# Patient Record
Sex: FEMALE | Race: BLACK OR AFRICAN AMERICAN | NOT HISPANIC OR LATINO | Employment: FULL TIME | URBAN - METROPOLITAN AREA
[De-identification: names, ages, dates, MRNs, and addresses within clinical notes are randomized per-mention and may not be internally consistent; named-entity substitution may affect disease eponyms.]

---

## 2018-04-20 LAB
A1C: 5.5
CHOLEST SERPL-MSCNC: 197 MG/DL (ref 0–200)
HDLC SERPL-MCNC: 31 MG/DL
LDLC SERPL CALC-MCNC: 143 MG/DL
NON HDL CHOL (CALC): 166
TRIGL SERPL-MCNC: 114 MG/DL

## 2018-09-23 ENCOUNTER — NURSE TRIAGE (OUTPATIENT)
Dept: ADMINISTRATIVE | Facility: CLINIC | Age: 38
End: 2018-09-23

## 2018-09-23 NOTE — TELEPHONE ENCOUNTER
Patient called to report the following:      -she has issues with cold and sinus and took hydrocodone cough syrup   -no she can't stop itching since 5pm yesterday   -no hives, but itching is everywhere   -denies fever or trouble breathing     Education completed per [facility] Care Advice including home care and follow up with provider. Patient verbalized understating.     Reason for Disposition   [1] SEVERE widespread itching (i.e., interferes with sleep, normal activities or school) AND [2] not improved after 24 hours of itching Care Advice    Protocols used: ST ITCHING - WIDESPREAD AND CAUSE UNKNOWN-A-AH

## 2018-11-04 PROBLEM — F41.9 ANXIETY: Status: ACTIVE | Noted: 2018-01-11

## 2018-11-08 ENCOUNTER — OFFICE VISIT (OUTPATIENT)
Dept: INTERNAL MEDICINE | Facility: CLINIC | Age: 38
End: 2018-11-08
Payer: COMMERCIAL

## 2018-11-08 VITALS
BODY MASS INDEX: 39.68 KG/M2 | WEIGHT: 293 LBS | SYSTOLIC BLOOD PRESSURE: 120 MMHG | HEART RATE: 73 BPM | DIASTOLIC BLOOD PRESSURE: 77 MMHG | TEMPERATURE: 96 F | HEIGHT: 72 IN | OXYGEN SATURATION: 97 %

## 2018-11-08 DIAGNOSIS — M25.561 ACUTE PAIN OF RIGHT KNEE: ICD-10-CM

## 2018-11-08 DIAGNOSIS — Z00.00 ANNUAL PHYSICAL EXAM: Primary | ICD-10-CM

## 2018-11-08 DIAGNOSIS — E88.819 INSULIN RESISTANCE: ICD-10-CM

## 2018-11-08 DIAGNOSIS — Z23 NEED FOR INFLUENZA VACCINATION: ICD-10-CM

## 2018-11-08 PROBLEM — F41.9 ANXIETY: Status: RESOLVED | Noted: 2018-01-11 | Resolved: 2018-11-08

## 2018-11-08 PROCEDURE — 90686 IIV4 VACC NO PRSV 0.5 ML IM: CPT | Mod: S$GLB,,, | Performed by: FAMILY MEDICINE

## 2018-11-08 PROCEDURE — 99999 PR PBB SHADOW E&M-EST. PATIENT-LVL IV: CPT | Mod: PBBFAC,,, | Performed by: FAMILY MEDICINE

## 2018-11-08 PROCEDURE — 90471 IMMUNIZATION ADMIN: CPT | Mod: S$GLB,,, | Performed by: FAMILY MEDICINE

## 2018-11-08 PROCEDURE — 3008F BODY MASS INDEX DOCD: CPT | Mod: CPTII,S$GLB,, | Performed by: FAMILY MEDICINE

## 2018-11-08 PROCEDURE — 99204 OFFICE O/P NEW MOD 45 MIN: CPT | Mod: 25,S$GLB,, | Performed by: FAMILY MEDICINE

## 2018-11-08 RX ORDER — DICLOFENAC SODIUM 75 MG/1
TABLET, DELAYED RELEASE ORAL
Refills: 0 | COMMUNITY
Start: 2018-11-06 | End: 2019-01-21

## 2018-11-08 RX ORDER — METFORMIN HYDROCHLORIDE 500 MG/1
500 TABLET ORAL 2 TIMES DAILY WITH MEALS
Qty: 180 TABLET | Refills: 3 | Status: SHIPPED | OUTPATIENT
Start: 2018-11-08 | End: 2019-02-19

## 2018-11-08 RX ORDER — TRAMADOL HYDROCHLORIDE 50 MG/1
TABLET ORAL
Refills: 0 | COMMUNITY
Start: 2018-11-06 | End: 2019-01-21

## 2018-11-08 RX ORDER — PANTOPRAZOLE SODIUM 40 MG/1
40 TABLET, DELAYED RELEASE ORAL DAILY
Qty: 90 TABLET | Refills: 0 | Status: SHIPPED | OUTPATIENT
Start: 2018-11-08 | End: 2019-10-10

## 2018-11-08 NOTE — PATIENT INSTRUCTIONS
Arthralgia    Arthralgia is the term for pain in or around the joint. It is a symptom, not a disease. This pain may involve one or more joints. In some cases, the pain moves from joint to joint.  There are many causes for joint pain. These include:  · Injury  · Osteoarthritis (wearing out of the joint surface)  · Gout (inflammation of the joint due to crystals in the joint fluid)  · Infection inside the joint    · Bursitis (inflammation of the fluid-filled sacs around the joint)  · Autoimmune disorders such as rheumatoid arthritis or lupus  · Tendonitis (inflammation of chords that attach muscle to bone)  Home care  · Rest the involved joint(s) until your symptoms improve.   · You may be prescribed pain medicine. If none is prescribed, you may use acetaminophen or ibuprofen to control pain and inflammation.  Follow-up care  Follow up with your healthcare provider or as advised.  When to seek medical advice  Contact your healthcare provider right away if any of the following occurs:  · Pain, swelling, or redness of joint increases  · Pain worsens or recurs after a period of improvement  · Pain moves to other joints  · You cannot bear weight on the affected joint   · You cannot move the affected joint  · Joint appears deformed  · New rash appears  · Fever of 100.4ºF (38ºC) or higher, or as directed by your healthcare provider  Date Last Reviewed: 3/1/2017  © 5537-3204 [employer]. [address] [place] [address], [place] [address]. All rights reserved. This information is not intended as a substitute for professional medical care. Always follow your healthcare professional's instructions.        Arthralgia    Arthralgia is the term for pain in or around the joint. It is a symptom, not a disease. This pain may involve one or more joints. In some cases, the pain moves from joint to joint.  There are many causes for joint pain. These include:  · Injury  · Osteoarthritis (wearing out of the joint surface)  · Gout  (inflammation of the joint due to crystals in the joint fluid)  · Infection inside the joint    · Bursitis (inflammation of the fluid-filled sacs around the joint)  · Autoimmune disorders such as rheumatoid arthritis or lupus  · Tendonitis (inflammation of chords that attach muscle to bone)  Home care  · Rest the involved joint(s) until your symptoms improve.   · You may be prescribed pain medicine. If none is prescribed, you may use acetaminophen or ibuprofen to control pain and inflammation.  Follow-up care  Follow up with your healthcare provider or as advised.  When to seek medical advice  Contact your healthcare provider right away if any of the following occurs:  · Pain, swelling, or redness of joint increases  · Pain worsens or recurs after a period of improvement  · Pain moves to other joints  · You cannot bear weight on the affected joint   · You cannot move the affected joint  · Joint appears deformed  · New rash appears  · Fever of 100.4ºF (38ºC) or higher, or as directed by your healthcare provider  Date Last Reviewed: 3/1/2017  © 0531-6899 [employer]. [address] [place] [address], [place] [address]. All rights reserved. This information is not intended as a substitute for professional medical care. Always follow your healthcare professional's instructions.

## 2018-11-08 NOTE — PROGRESS NOTES
Brook Burdick  11/08/2018  [id number]    Adamaris Arguello MD  Patient Care Team:  Adamaris Arguello MD as PCP - General (Family Medicine)  Has the patient seen any provider outside of the [facility] network since the last visit? (no). If yes, HIPPA forms completed and records requested.        Visit Type:a scheduled routine follow-up visit    Chief Complaint:  Chief Complaint   Patient presents with    Establish Care    Knee Pain     right knee pain and it has been going on for about a week. she went to urgent care tuesday they gave her tramadol and diclofenac sodium 75mg.       History of Present Illness:  Patient is here for check up.    She reports history of knee pain. She went to Urgent care. They gave her Ultram and NSAID.  She went about a week.  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 She reports that she landed on it wrong. She reports that she does get swelling.  She reports that NSAID has helped with the swelling. Most pain is on both sides of the knee, and if she sits with it bent she feels her knee cap shift.  She reports feeling of heavy in the knee.       She does have GERD, used to be on Protonix, she changed insurance and had to stop. She would like to start.  She also has IBS sx.    She reports last pap was in 2016. She reports done at [facility]. Dr. Espinoza    Labs reviewed from [facility]  GLucose 107.   hgA1c 5.5  Insulin level  Has been elevated in the past.      History:  Past Medical History:   Diagnosis Date    IBS (irritable bowel syndrome)     Pancreatitis      Past Surgical History:   Procedure Laterality Date    APPENDECTOMY      BILE DUCT EXPLORATION      CHOLECYSTECTOMY       Family History   Problem Relation Age of Onset    Atrial fibrillation Mother     Diabetes Father     Hypertension Father     Cancer Maternal Grandfather      Social History     Socioeconomic History    Marital status: Single     Spouse name: Not on file    Number of children: Not on  file    Years of education: Not on file    Highest education level: Not on file   Social Needs    Financial resource strain: Not on file    Food insecurity - worry: Not on file    Food insecurity - inability: Not on file    Transportation needs - medical: Not on file    Transportation needs - non-medical: Not on file   Occupational History    Not on file   Tobacco Use    Smoking status: Never Smoker    Smokeless tobacco: Never Used   Substance and Sexual Activity    Alcohol use: No     Frequency: Never    Drug use: No    Sexual activity: No   Other Topics Concern    Not on file   Social History Narrative    Not on file     Patient Active Problem List   Diagnosis    Anxiety    Chronic constipation    Family history of colonic polyps    Insulin resistance     Review of patient's allergies indicates:   Allergen Reactions    Penicillins Hives       The following were reviewed at this visit: active problem list, medication list, allergies, family history, social history, and health maintenance.    Medications:  Current Outpatient Medications on File Prior to Visit   Medication Sig Dispense Refill    diclofenac (VOLTAREN) 75 MG EC tablet TK 1 T PO BID FOR 10 DAYS PRF PAIN  0    traMADol (ULTRAM) 50 mg tablet TK 1 TO 2 TS PO Q 6 H PRN P  0    [DISCONTINUED] pantoprazole (PROTONIX) 40 MG tablet TK 1 T PO QD      [DISCONTINUED] sertraline (ZOLOFT) 50 MG tablet Take 1 tablet by mouth daily.       No current facility-administered medications on file prior to visit.        Medications have been reviewed and reconciled with patient at this visit.  Barriers to medications present (no)    Adverse reactions to current medications (no)    Over the counter medications reviewed (Yes ), and if needed added to active Medication list at this visit.     Exam:  Wt Readings from Last 3 Encounters:   11/08/18 (!) 153.4 kg (338 lb 3 oz)     Temp Readings from Last 3 Encounters:   11/08/18 96.4 °F (35.8 °C) (Tympanic)      BP Readings from Last 3 Encounters:   No data found for BP     Pulse Readings from Last 3 Encounters:   11/08/18 73     Body mass index is 45.87 kg/m².      Review of Systems   Constitutional: Negative.  Negative for chills and fever.   HENT: Negative.  Negative for congestion, sinus pain and sore throat.    Eyes: Negative for blurred vision and double vision.   Respiratory: Negative for cough, sputum production, shortness of breath and wheezing.    Cardiovascular: Negative for chest pain, palpitations and leg swelling.   Gastrointestinal: Negative for abdominal pain, constipation, diarrhea, heartburn, nausea and vomiting.   Genitourinary: Negative.    Musculoskeletal: Positive for joint pain.   Skin: Negative.  Negative for rash.   Neurological: Negative.    Endo/Heme/Allergies: Negative.  Negative for polydipsia. Does not bruise/bleed easily.   Psychiatric/Behavioral: Negative for depression and substance abuse.     Physical Exam   Constitutional: She is oriented to person, place, and time. She appears well-developed and well-nourished. No distress.   HENT:   Head: Normocephalic and atraumatic.   Right Ear: External ear normal.   Left Ear: External ear normal.   Nose: Nose normal.   Mouth/Throat: Oropharynx is clear and moist. No oropharyngeal exudate.   Eyes: Conjunctivae and EOM are normal. Pupils are equal, round, and reactive to light. Right eye exhibits no discharge. Left eye exhibits no discharge.   Neck: Normal range of motion. Neck supple. No thyromegaly present.   Cardiovascular: Normal rate, regular rhythm, normal heart sounds and intact distal pulses.   No murmur heard.  Pulmonary/Chest: Effort normal and breath sounds normal. No respiratory distress. She has no wheezes.   Abdominal: Soft. Bowel sounds are normal. She exhibits no distension and no mass. There is no tenderness.   Musculoskeletal: Normal range of motion. She exhibits tenderness. She exhibits no edema.        Right knee: She exhibits  swelling. She exhibits no bony tenderness. Tenderness found. Patellar tendon tenderness noted.        Legs:  Lymphadenopathy:     She has no cervical adenopathy.   Neurological: She is alert and oriented to person, place, and time. No cranial nerve deficit.   Skin: Capillary refill takes less than 2 seconds. She is not diaphoretic.   Psychiatric: She has a normal mood and affect. Her behavior is normal. Judgment and thought content normal.   Nursing note and vitals reviewed.      Laboratory Reviewed ({Yes)  Lab Results   Component Value Date    WBC 6.14 08/28/2007    HGB 13.1 08/28/2007    HCT 39.3 08/28/2007     08/28/2007    CHOL 211 (H) 08/28/2007    TRIG 92 08/28/2007    HDL 36 (L) 08/28/2007    ALT 33 (H) 08/28/2007    AST 22 08/28/2007     08/28/2007    K 4.1 08/28/2007     08/28/2007    CREATININE 0.7 08/28/2007    BUN 14 08/28/2007    CO2 20 (L) 08/28/2007    TSH 1.8 08/28/2007       Annual physical exam    Acute pain of right knee  -     Ambulatory consult to Physical Therapy    Insulin resistance  -     metFORMIN (GLUCOPHAGE) 500 MG tablet; Take 1 tablet (500 mg total) by mouth 2 (two) times daily with meals.  Dispense: 180 tablet; Refill: 3    Need for influenza vaccination    Other orders  -     pantoprazole (PROTONIX) 40 MG tablet; Take 1 tablet (40 mg total) by mouth once daily.  Dispense: 90 tablet; Refill: 0    Records from [facility]  Diet and exercise weight loss  Trial of Metformin, discuss side effects  Follow up 6 months    The patient's BMI has been recorded in the chart. The patient has been provided educational materials regarding the benefits of attaining and maintaining a normal weight. We will continue to address and follow this issue during follow up visits.      Care Plan/Goals: Reviewed  (N/A)  Goals     None          Follow up: No Follow-up on file.    After visit summary was printed and given to patient upon discharge today.  Patient goals and care plan are included in  After Visit Summary.

## 2018-12-02 PROBLEM — K21.9 GASTROESOPHAGEAL REFLUX DISEASE WITHOUT ESOPHAGITIS: Status: ACTIVE | Noted: 2018-12-02

## 2018-12-02 NOTE — PROGRESS NOTES
Brook Burdick  12/03/2018  [id number]    Adamaris Arguello MD  Patient Care Team:  Adamaris Arguello MD as PCP - General (Family Medicine)  Alice Walker MD as Obstetrician (Obstetrics)  Has the patient seen any provider outside of the [facility] network since the last visit? (no). If yes, HIPPA forms completed and records requested.        Visit Type:an urgent visit for an existing problem     Chief Complaint:  No chief complaint on file.      History of Present Illness:  Patient here to discuss GI issues.    She has family history of colon polyps. She has seen colon rectal at [facility], Dr. Lin. She had colon done with 2 polyps.  She also consulted for chronic constipation. This was in 2016 she had colon done. Reported to repeat colon in 5 years.    She also saw Dr. Lott at [facility]. She saw him for reflux. She had EGD done in 9/2017 and had gastric erythema and was given PPI.  She reports intermittent IBS symptoms with bloating and constipation.    She has stomach pain, burning pain.  She reports [employer] works, but then she has to miss work because of the diarrhea, and feels that it makes her rectum raw.    She does take PPI. She still has GERD with the Protonix.     She is looking to loose weight.  She was last on Apidex in 2011.  She would like to try again  She wants to loose weight.       History:  Past Medical History:   Diagnosis Date    IBS (irritable bowel syndrome)     Pancreatitis      Past Surgical History:   Procedure Laterality Date    APPENDECTOMY      BILE DUCT EXPLORATION      CHOLECYSTECTOMY       Family History   Problem Relation Age of Onset    Atrial fibrillation Mother     Diabetes Father     Hypertension Father     Cancer Maternal Grandfather      Social History     Socioeconomic History    Marital status: Single     Spouse name: Not on file    Number of children: Not on file    Years of education: Not on file    Highest education level: Not on file   Social  Needs    Financial resource strain: Not on file    Food insecurity - worry: Not on file    Food insecurity - inability: Not on file    Transportation needs - medical: Not on file    Transportation needs - non-medical: Not on file   Occupational History    Not on file   Tobacco Use    Smoking status: Never Smoker    Smokeless tobacco: Never Used   Substance and Sexual Activity    Alcohol use: No     Frequency: Never    Drug use: No    Sexual activity: No   Other Topics Concern    Not on file   Social History Narrative    Not on file     Patient Active Problem List   Diagnosis    Chronic constipation    Family history of colonic polyps    Insulin resistance    Gastroesophageal reflux disease without esophagitis     Review of patient's allergies indicates:   Allergen Reactions    Penicillins Hives    Tramadol Itching       The following were reviewed at this visit: active problem list, medication list, allergies, family history, social history, and health maintenance.    Medications:  Current Outpatient Medications on File Prior to Visit   Medication Sig Dispense Refill    diclofenac (VOLTAREN) 75 MG EC tablet TK 1 T PO BID FOR 10 DAYS PRF PAIN  0    metFORMIN (GLUCOPHAGE) 500 MG tablet Take 1 tablet (500 mg total) by mouth 2 (two) times daily with meals. 180 tablet 3    pantoprazole (PROTONIX) 40 MG tablet Take 1 tablet (40 mg total) by mouth once daily. 90 tablet 0    traMADol (ULTRAM) 50 mg tablet TK 1 TO 2 TS PO Q 6 H PRN P  0     No current facility-administered medications on file prior to visit.        Medications have been reviewed and reconciled with patient at this visit.  Barriers to medications present (no)    Adverse reactions to current medications (no)    Over the counter medications reviewed (Yes ), and if needed added to active Medication list at this visit.     Exam:  Wt Readings from Last 3 Encounters:   11/08/18 (!) 153.4 kg (338 lb 3 oz)     Temp Readings from Last 3  Encounters:   11/08/18 96.4 °F (35.8 °C) (Tympanic)     BP Readings from Last 3 Encounters:   11/08/18 120/77     Pulse Readings from Last 3 Encounters:   11/08/18 73     There is no height or weight on file to calculate BMI.      Review of Systems   Constitutional: Negative.  Negative for chills and fever.   HENT: Negative.  Negative for congestion, sinus pain and sore throat.    Eyes: Negative for blurred vision and double vision.   Respiratory: Negative for cough, sputum production, shortness of breath and wheezing.    Cardiovascular: Negative for chest pain, palpitations and leg swelling.   Gastrointestinal: Positive for abdominal pain and constipation. Negative for diarrhea, heartburn, nausea and vomiting.   Genitourinary: Negative.    Musculoskeletal: Negative.    Skin: Negative.  Negative for rash.   Neurological: Negative.    Endo/Heme/Allergies: Negative.  Negative for polydipsia. Does not bruise/bleed easily.   Psychiatric/Behavioral: Negative for depression and substance abuse.     Physical Exam   Constitutional: She is oriented to person, place, and time. She appears well-developed and well-nourished. No distress.   HENT:   Head: Normocephalic and atraumatic.   Right Ear: External ear normal.   Left Ear: External ear normal.   Nose: Nose normal.   Mouth/Throat: Oropharynx is clear and moist. No oropharyngeal exudate.   Eyes: Conjunctivae and EOM are normal. Pupils are equal, round, and reactive to light. Right eye exhibits no discharge. Left eye exhibits no discharge.   Neck: Normal range of motion. Neck supple. No thyromegaly present.   Cardiovascular: Normal rate, regular rhythm, normal heart sounds and intact distal pulses.   No murmur heard.  Pulmonary/Chest: Effort normal and breath sounds normal. No respiratory distress. She has no wheezes.   Abdominal: Soft. Bowel sounds are normal. She exhibits no distension and no mass. There is no tenderness.   Musculoskeletal: Normal range of motion. She  exhibits no edema.   Lymphadenopathy:     She has no cervical adenopathy.   Neurological: She is alert and oriented to person, place, and time. No cranial nerve deficit.   Skin: Capillary refill takes less than 2 seconds. She is not diaphoretic.   Psychiatric: She has a normal mood and affect. Her behavior is normal. Judgment and thought content normal.   Nursing note and vitals reviewed.      Laboratory Reviewed ({Yes)  Lab Results   Component Value Date    WBC 6.14 08/28/2007    HGB 13.1 08/28/2007    HCT 39.3 08/28/2007     08/28/2007    CHOL 211 (H) 08/28/2007    TRIG 92 08/28/2007    HDL 36 (L) 08/28/2007    ALT 33 (H) 08/28/2007    AST 22 08/28/2007     08/28/2007    K 4.1 08/28/2007     08/28/2007    CREATININE 0.7 08/28/2007    BUN 14 08/28/2007    CO2 20 (L) 08/28/2007    TSH 1.8 08/28/2007       Diagnoses and all orders for this visit:    Irritable bowel syndrome with constipation    Gastroesophageal reflux disease without esophagitis      Switch to Amitiza  Elimination diet  Continue PPI.    Follow up with GI for additional recommendations.    Apidex weight loss  Increase exercise  Increase water intake  Only will be on for 90 days          Care Plan/Goals: Reviewed  (N/A)  Goals     None          Follow up: No Follow-up on file.    After visit summary was printed and given to patient upon discharge today.  Patient goals and care plan are included in After Visit Summary.

## 2018-12-03 ENCOUNTER — OFFICE VISIT (OUTPATIENT)
Dept: INTERNAL MEDICINE | Facility: CLINIC | Age: 38
End: 2018-12-03
Payer: COMMERCIAL

## 2018-12-03 VITALS
RESPIRATION RATE: 16 BRPM | BODY MASS INDEX: 39.68 KG/M2 | SYSTOLIC BLOOD PRESSURE: 122 MMHG | HEART RATE: 85 BPM | HEIGHT: 72 IN | TEMPERATURE: 97 F | DIASTOLIC BLOOD PRESSURE: 78 MMHG | OXYGEN SATURATION: 96 % | WEIGHT: 293 LBS

## 2018-12-03 DIAGNOSIS — K58.1 IRRITABLE BOWEL SYNDROME WITH CONSTIPATION: Primary | ICD-10-CM

## 2018-12-03 DIAGNOSIS — K21.9 GASTROESOPHAGEAL REFLUX DISEASE WITHOUT ESOPHAGITIS: ICD-10-CM

## 2018-12-03 DIAGNOSIS — E66.01 MORBID OBESITY: ICD-10-CM

## 2018-12-03 PROCEDURE — 3008F BODY MASS INDEX DOCD: CPT | Mod: CPTII,S$GLB,, | Performed by: FAMILY MEDICINE

## 2018-12-03 PROCEDURE — 99999 PR PBB SHADOW E&M-EST. PATIENT-LVL III: CPT | Mod: PBBFAC,,, | Performed by: FAMILY MEDICINE

## 2018-12-03 PROCEDURE — 99214 OFFICE O/P EST MOD 30 MIN: CPT | Mod: S$GLB,,, | Performed by: FAMILY MEDICINE

## 2018-12-03 RX ORDER — PHENTERMINE HYDROCHLORIDE 37.5 MG/1
37.5 TABLET ORAL
Qty: 30 TABLET | Refills: 0 | Status: SHIPPED | OUTPATIENT
Start: 2018-12-03 | End: 2019-01-09 | Stop reason: SDUPTHER

## 2018-12-03 RX ORDER — LUBIPROSTONE 8 UG/1
8 CAPSULE ORAL 2 TIMES DAILY
Qty: 60 CAPSULE | Refills: 3 | Status: SHIPPED | OUTPATIENT
Start: 2018-12-03 | End: 2019-05-13

## 2019-01-08 ENCOUNTER — TELEPHONE (OUTPATIENT)
Dept: INTERNAL MEDICINE | Facility: CLINIC | Age: 39
End: 2019-01-08

## 2019-01-08 NOTE — TELEPHONE ENCOUNTER
----- Message from Mecca Sierra sent at 1/8/2019  4:39 PM CST -----  Contact: pt  She is calling to get a refill...    1. What is the name of the medication you are requesting? Adipex  2. What is the dose? unknown  3. How do you take the medication? Orally, topically, etc? orally  4. How often do you take this medication? Once daily  5. Do you need a 30 day or 90 day supply? 30  6. How many refills are you requesting? 1  7. What is your preferred pharmacy and location of the pharmacy? [facility] on [address] [facility]  8. Who can we contact with further questions? [phone] (pgvp)

## 2019-01-09 RX ORDER — PHENTERMINE HYDROCHLORIDE 37.5 MG/1
37.5 TABLET ORAL
Qty: 30 TABLET | Refills: 0 | Status: SHIPPED | OUTPATIENT
Start: 2019-01-09 | End: 2019-02-08

## 2019-01-16 ENCOUNTER — TELEPHONE (OUTPATIENT)
Dept: INTERNAL MEDICINE | Facility: CLINIC | Age: 39
End: 2019-01-16

## 2019-01-16 NOTE — TELEPHONE ENCOUNTER
----- Message from Venita Stafford sent at 1/16/2019  4:06 PM CST -----  Contact: Pt   Pt called and stated she needs a refill:    .1. What is the name of the medication you are requesting? Tramadol   2. What is the dose? Pt called and stated she does not know   3. How do you take the medication? Orally, topically, etc? Orally   4. How often do you take this medication?As needed    5. Do you need a 30 day or 90 day supply? 30 days   6. How many refills are you requesting?   7. What is your preferred pharmacy and location of the pharmacy?   [facility] [address] [place] [address] [address] [place] [address] AT [facility]  [address] [facility] [address]  Phone: [phone] Fax: [phone]      8. Who can we contact with further questions? Pt can be reached at [phone] (home)    Thanks,  TF

## 2019-01-17 NOTE — TELEPHONE ENCOUNTER
Patient asked would that mess with the issues she already has with her stomach? She said but whatever you think is best.

## 2019-01-18 NOTE — TELEPHONE ENCOUNTER
She can stop the Naprosyn. If she is worried more about GERD or stomach, Mobic would be the choice, and to skin with taking Protonix with it.  I will send if she agrees. I don't want her on chronic pain meds.

## 2019-01-21 RX ORDER — MELOXICAM 7.5 MG/1
7.5 TABLET ORAL DAILY
Qty: 30 TABLET | Refills: 0 | Status: SHIPPED | OUTPATIENT
Start: 2019-01-21 | End: 2019-10-10

## 2019-02-19 ENCOUNTER — TELEPHONE (OUTPATIENT)
Dept: PEDIATRICS | Facility: CLINIC | Age: 39
End: 2019-02-19

## 2019-02-19 NOTE — TELEPHONE ENCOUNTER
----- Message from Starrmoisedeisi Kurtis sent at 2/19/2019  2:09 PM CST -----  Contact: Pt  ..1. What is the name of the medication you are requesting? ADIPEX  2. What is the dose? N/A  3. How do you take the medication? Orally, topically, etc? Orally  4. How often do you take this medication? Daily  5. Do you need a 30 day or 90 day supply? 30  6. How many refills are you requesting? 1  7. What is your preferred pharmacy and location of the pharmacy? ..  [facility] [id number] [place] [address] [address] [facility]  [address]  [facility] [address]  Phone: [phone] Fax: [phone]      8. Who can we contact with further questions? ..[phone] ([place])

## 2019-02-20 ENCOUNTER — OFFICE VISIT (OUTPATIENT)
Dept: INTERNAL MEDICINE | Facility: CLINIC | Age: 39
End: 2019-02-20
Payer: COMMERCIAL

## 2019-02-20 ENCOUNTER — CLINICAL SUPPORT (OUTPATIENT)
Dept: CARDIOLOGY | Facility: CLINIC | Age: 39
End: 2019-02-20
Payer: COMMERCIAL

## 2019-02-20 VITALS
HEART RATE: 76 BPM | DIASTOLIC BLOOD PRESSURE: 70 MMHG | WEIGHT: 293 LBS | TEMPERATURE: 97 F | BODY MASS INDEX: 39.68 KG/M2 | SYSTOLIC BLOOD PRESSURE: 110 MMHG | OXYGEN SATURATION: 97 % | HEIGHT: 72 IN

## 2019-02-20 DIAGNOSIS — M25.561 CHRONIC PAIN OF BOTH KNEES: ICD-10-CM

## 2019-02-20 DIAGNOSIS — M54.5 CHRONIC MIDLINE LOW BACK PAIN, WITH SCIATICA PRESENCE UNSPECIFIED: ICD-10-CM

## 2019-02-20 DIAGNOSIS — G89.29 CHRONIC MIDLINE LOW BACK PAIN, WITH SCIATICA PRESENCE UNSPECIFIED: ICD-10-CM

## 2019-02-20 DIAGNOSIS — R00.0 RAPID HEART RATE: Primary | ICD-10-CM

## 2019-02-20 DIAGNOSIS — Z71.3 WEIGHT LOSS COUNSELING, ENCOUNTER FOR: ICD-10-CM

## 2019-02-20 DIAGNOSIS — R00.0 RAPID HEART RATE: ICD-10-CM

## 2019-02-20 DIAGNOSIS — G89.29 CHRONIC PAIN OF BOTH KNEES: ICD-10-CM

## 2019-02-20 DIAGNOSIS — E66.01 SEVERE OBESITY (BMI >= 40): ICD-10-CM

## 2019-02-20 DIAGNOSIS — M25.562 CHRONIC PAIN OF BOTH KNEES: ICD-10-CM

## 2019-02-20 PROCEDURE — 99213 OFFICE O/P EST LOW 20 MIN: CPT | Mod: S$GLB,,, | Performed by: FAMILY MEDICINE

## 2019-02-20 PROCEDURE — 93005 ELECTROCARDIOGRAM TRACING: CPT | Mod: S$GLB,,, | Performed by: FAMILY MEDICINE

## 2019-02-20 PROCEDURE — 99213 PR OFFICE/OUTPT VISIT, EST, LEVL III, 20-29 MIN: ICD-10-PCS | Mod: S$GLB,,, | Performed by: FAMILY MEDICINE

## 2019-02-20 PROCEDURE — 93010 EKG 12-LEAD: ICD-10-PCS | Mod: S$GLB,,, | Performed by: INTERNAL MEDICINE

## 2019-02-20 PROCEDURE — 3008F BODY MASS INDEX DOCD: CPT | Mod: CPTII,S$GLB,, | Performed by: FAMILY MEDICINE

## 2019-02-20 PROCEDURE — 3008F PR BODY MASS INDEX (BMI) DOCUMENTED: ICD-10-PCS | Mod: CPTII,S$GLB,, | Performed by: FAMILY MEDICINE

## 2019-02-20 PROCEDURE — 99999 PR PBB SHADOW E&M-EST. PATIENT-LVL IV: CPT | Mod: PBBFAC,,, | Performed by: FAMILY MEDICINE

## 2019-02-20 PROCEDURE — 93005 EKG 12-LEAD: ICD-10-PCS | Mod: S$GLB,,, | Performed by: FAMILY MEDICINE

## 2019-02-20 PROCEDURE — 99999 PR PBB SHADOW E&M-EST. PATIENT-LVL IV: ICD-10-PCS | Mod: PBBFAC,,, | Performed by: FAMILY MEDICINE

## 2019-02-20 PROCEDURE — 93010 ELECTROCARDIOGRAM REPORT: CPT | Mod: S$GLB,,, | Performed by: INTERNAL MEDICINE

## 2019-02-20 RX ORDER — PHENTERMINE HYDROCHLORIDE 37.5 MG/1
37.5 CAPSULE ORAL EVERY MORNING
Qty: 30 CAPSULE | Refills: 0 | Status: SHIPPED | OUTPATIENT
Start: 2019-02-20 | End: 2019-02-20 | Stop reason: SDUPTHER

## 2019-02-20 RX ORDER — PHENTERMINE HYDROCHLORIDE 37.5 MG/1
37.5 CAPSULE ORAL EVERY MORNING
Qty: 30 CAPSULE | Refills: 0 | Status: SHIPPED | OUTPATIENT
Start: 2019-02-20 | End: 2019-03-22

## 2019-02-20 NOTE — PROGRESS NOTES
Brook Burdick  02/22/2019  [id number]    Adamaris Arguello MD  Patient Care Team:  Adamaris Arguello MD as PCP - General (Family Medicine)  Alice Walker MD as Obstetrician (Obstetrics)  Yue Chan III, MD (Obstetrics and Gynecology)  Has the patient seen any provider outside of the [facility] network since the last visit? (no). If yes, HIPPA forms completed and records requested.        Visit Type:a scheduled routine follow-up visit    Chief Complaint:  Chief Complaint   Patient presents with    Follow-up       History of Present Illness:  38 year old here for follow up.    She was on Adipex for work on weight loss.    Some palpitations, bounding heart rate. She repots that this was doing it before Adipex.   She did loose weight with Adipex.   She does admit to more Anxiety. She was on Zoloft before that did help with her anxiety, but stopped taking it last year as she felt a little better.    No metformin for IR due to her IBS.    She has chronic knee pain. Reports barrier to exercise.  She reports also having lower back pain. WOrse with prolonged sitting and standing.  No pain down legs.  Takes OTC tylenol/motrin, but still hurts.      History:  Past Medical History:   Diagnosis Date    IBS (irritable bowel syndrome)     Pancreatitis      Past Surgical History:   Procedure Laterality Date    APPENDECTOMY      BILE DUCT EXPLORATION      CHOLECYSTECTOMY       Family History   Problem Relation Age of Onset    Atrial fibrillation Mother     Diabetes Father     Hypertension Father     Cancer Maternal Grandfather      Social History     Socioeconomic History    Marital status: Single     Spouse name: Not on file    Number of children: Not on file    Years of education: Not on file    Highest education level: Not on file   Social Needs    Financial resource strain: Not on file    Food insecurity - worry: Not on file    Food insecurity - inability: Not on file    Transportation needs  - medical: Not on file    Transportation needs - non-medical: Not on file   Occupational History    Not on file   Tobacco Use    Smoking status: Never Smoker    Smokeless tobacco: Never Used   Substance and Sexual Activity    Alcohol use: No     Frequency: Never    Drug use: No    Sexual activity: No   Other Topics Concern    Not on file   Social History Narrative    Not on file     Patient Active Problem List   Diagnosis    Chronic constipation    Family history of colonic polyps    Insulin resistance    Gastroesophageal reflux disease without esophagitis     Review of patient's allergies indicates:   Allergen Reactions    Penicillins Hives    Tramadol Itching       The following were reviewed at this visit: active problem list, medication list, allergies, family history, social history, and health maintenance.    Medications:  Current Outpatient Medications on File Prior to Visit   Medication Sig Dispense Refill    meloxicam (MOBIC) 7.5 MG tablet Take 1 tablet (7.5 mg total) by mouth once daily. 30 tablet 0    lubiprostone (AMITIZA) 8 MCG Cap Take 1 capsule (8 mcg total) by mouth 2 (two) times daily. 60 capsule 3    pantoprazole (PROTONIX) 40 MG tablet Take 1 tablet (40 mg total) by mouth once daily. 90 tablet 0     No current facility-administered medications on file prior to visit.        Medications have been reviewed and reconciled with patient at this visit.  Barriers to medications present (no)    Adverse reactions to current medications (no)    Over the counter medications reviewed (No ), and if needed added to active Medication list at this visit.     Exam:  Wt Readings from Last 3 Encounters:   02/20/19 (!) 146.6 kg (323 lb 3.1 oz)   12/03/18 (!) 154.7 kg (341 lb 0.8 oz)   11/08/18 (!) 153.4 kg (338 lb 3 oz)     Temp Readings from Last 3 Encounters:   02/20/19 97.2 °F (36.2 °C) (Tympanic)   12/03/18 97.3 °F (36.3 °C)   11/08/18 96.4 °F (35.8 °C) (Tympanic)     BP Readings from Last 3  Encounters:   02/20/19 110/70   12/03/18 122/78   11/08/18 120/77     Pulse Readings from Last 3 Encounters:   02/20/19 76   12/03/18 85   11/08/18 73     Body mass index is 43.83 kg/m².      Review of Systems   Constitutional: Negative.  Negative for chills and fever.   HENT: Negative.  Negative for congestion, hearing loss, sinus pain and sore throat.    Eyes: Negative for blurred vision, double vision and discharge.   Respiratory: Negative for cough, sputum production, shortness of breath and wheezing.    Cardiovascular: Positive for chest pain and palpitations. Negative for leg swelling.   Gastrointestinal: Negative for abdominal pain, blood in stool, constipation, diarrhea, heartburn, nausea and vomiting.   Genitourinary: Negative.  Negative for dysuria and hematuria.   Musculoskeletal: Positive for back pain and joint pain. Negative for neck pain.   Skin: Negative.  Negative for rash.   Neurological: Positive for headaches. Negative for weakness.   Endo/Heme/Allergies: Negative.  Negative for polydipsia. Does not bruise/bleed easily.   Psychiatric/Behavioral: Negative for depression and substance abuse.     Physical Exam   Constitutional: She is oriented to person, place, and time. She appears well-developed and well-nourished. No distress.   HENT:   Head: Normocephalic and atraumatic.   Right Ear: External ear normal.   Left Ear: External ear normal.   Nose: Nose normal.   Mouth/Throat: Oropharynx is clear and moist. No oropharyngeal exudate.   Eyes: Conjunctivae and EOM are normal. Pupils are equal, round, and reactive to light. Right eye exhibits no discharge. Left eye exhibits no discharge.   Neck: Normal range of motion. Neck supple. No thyromegaly present.   Cardiovascular: Normal rate, regular rhythm, normal heart sounds and intact distal pulses.   No murmur heard.  Pulmonary/Chest: Effort normal and breath sounds normal. No respiratory distress. She has no wheezes.   Abdominal: Soft. Bowel sounds are  normal. She exhibits no distension and no mass. There is no tenderness.   Musculoskeletal: She exhibits tenderness. She exhibits no edema.        Right knee: She exhibits normal range of motion. Tenderness found. Medial joint line and lateral joint line tenderness noted.        Left knee: She exhibits normal range of motion. Tenderness found. Medial joint line and lateral joint line tenderness noted.        Lumbar back: She exhibits tenderness. She exhibits no pain.        Back:    Lymphadenopathy:     She has no cervical adenopathy.   Neurological: She is alert and oriented to person, place, and time. No cranial nerve deficit.   Skin: Capillary refill takes less than 2 seconds. She is not diaphoretic.   Psychiatric: She has a normal mood and affect. Her behavior is normal. Judgment and thought content normal.   Nursing note and vitals reviewed.      Laboratory Reviewed ({No)  Lab Results   Component Value Date    WBC 6.14 08/28/2007    HGB 13.1 08/28/2007    HCT 39.3 08/28/2007     08/28/2007    CHOL 211 (H) 08/28/2007    TRIG 92 08/28/2007    HDL 36 (L) 08/28/2007    ALT 33 (H) 08/28/2007    AST 22 08/28/2007     08/28/2007    K 4.1 08/28/2007     08/28/2007    CREATININE 0.7 08/28/2007    BUN 14 08/28/2007    CO2 20 (L) 08/28/2007    TSH 1.8 08/28/2007       Allegra was seen today for follow-up.    Diagnoses and all orders for this visit:    Rapid heart rate  -     EKG 12-lead; Future  -     Holter monitor - 48 hour; Future    Severe obesity (BMI >= 40)    Weight loss counseling, encounter for    Chronic midline low back pain, with sciatica presence unspecified  -     Ambulatory consult to Physical Therapy    Chronic pain of both knees  -     Ambulatory consult to Physical Therapy    Other orders  -     phentermine 37.5 MG capsule; Take 1 capsule (37.5 mg total) by mouth every morning.      Patient has lost about 20 pounds with Adipex.   I will review her EKG and add Holter.   TSH was fine in  fall last year.  Suspect sensation is due to Anxiety.   I will do holter, if negative, then we can revisit starting Zoloft if patient willing.    COunseling on diet and exercise and weight loss.  Last month of 3/3 Rx for adipex given today since EKG fine.    Referral to PT for her back and knee pain.    Recheck 6 months  Will do phone review with Holter results            Care Plan/Goals: Reviewed  (N/A)  Goals     None          Follow up: No Follow-up on file.    After visit summary was printed and given to patient upon discharge today.  Patient goals and care plan are included in After Visit Summary.    Answers for HPI/ROS submitted by the patient on 2/19/2019   activity change: No  unexpected weight change: No  rhinorrhea: No  trouble swallowing: No  visual disturbance: No  chest tightness: No  polyuria: No  difficulty urinating: No  menstrual problem: No  joint swelling: No  arthralgias: No  confusion: No  dysphoric mood: Yes    Answers for HPI/ROS submitted by the patient on 2/19/2019   activity change: No  unexpected weight change: No  rhinorrhea: No  trouble swallowing: No  visual disturbance: No  chest tightness: No  polyuria: No  difficulty urinating: No  menstrual problem: No  joint swelling: No  arthralgias: No  confusion: No  dysphoric mood: Yes

## 2019-03-15 ENCOUNTER — TELEPHONE (OUTPATIENT)
Dept: INTERNAL MEDICINE | Facility: CLINIC | Age: 39
End: 2019-03-15

## 2019-03-15 NOTE — TELEPHONE ENCOUNTER
----- Message from Ulysses العلي MA sent at 3/15/2019  1:36 PM CDT -----  Contact: Patient      ----- Message -----  From: Lisa Knox  Sent: 3/15/2019  12:28 PM  To: Jos Bailon Staff    Type:  Needs Medical Advice    Who Called: Patient  Symptoms (please be specific):   How long has patient had these symptoms:    Pharm[facility] name and phone #:  [facility]  Would the patient rather a call back or a response via [facility]? call  Best Call Back Number: [phone]  Additional Information: Patient states she is in pain after having physical therapy, would like to have something called in for her.

## 2019-03-15 NOTE — TELEPHONE ENCOUNTER
Ice, elevate.    Make sure to take Mobic.    I do not think narcotics are warranted.  Can use Mobic, or can hold Mobic and rotate Motrin and Tylenol.

## 2019-03-25 ENCOUNTER — CLINICAL SUPPORT (OUTPATIENT)
Dept: CARDIOLOGY | Facility: CLINIC | Age: 39
End: 2019-03-25
Attending: FAMILY MEDICINE
Payer: COMMERCIAL

## 2019-03-25 DIAGNOSIS — R00.0 RAPID HEART RATE: ICD-10-CM

## 2019-03-25 PROCEDURE — 93224 XTRNL ECG REC UP TO 48 HRS: CPT | Mod: S$GLB,,, | Performed by: INTERNAL MEDICINE

## 2019-03-25 PROCEDURE — 93224 HOLTER MONITOR - 48 HOUR: ICD-10-PCS | Mod: S$GLB,,, | Performed by: INTERNAL MEDICINE

## 2019-04-03 ENCOUNTER — OFFICE VISIT (OUTPATIENT)
Dept: INTERNAL MEDICINE | Facility: CLINIC | Age: 39
End: 2019-04-03
Payer: COMMERCIAL

## 2019-04-03 VITALS
OXYGEN SATURATION: 97 % | SYSTOLIC BLOOD PRESSURE: 116 MMHG | DIASTOLIC BLOOD PRESSURE: 74 MMHG | WEIGHT: 293 LBS | TEMPERATURE: 97 F | HEIGHT: 72 IN | HEART RATE: 100 BPM | BODY MASS INDEX: 39.68 KG/M2

## 2019-04-03 DIAGNOSIS — E66.01 SEVERE OBESITY (BMI >= 40): ICD-10-CM

## 2019-04-03 DIAGNOSIS — F41.9 ANXIETY: Primary | ICD-10-CM

## 2019-04-03 PROCEDURE — 99999 PR PBB SHADOW E&M-EST. PATIENT-LVL III: ICD-10-PCS | Mod: PBBFAC,,, | Performed by: FAMILY MEDICINE

## 2019-04-03 PROCEDURE — 99999 PR PBB SHADOW E&M-EST. PATIENT-LVL III: CPT | Mod: PBBFAC,,, | Performed by: FAMILY MEDICINE

## 2019-04-03 PROCEDURE — 3008F PR BODY MASS INDEX (BMI) DOCUMENTED: ICD-10-PCS | Mod: CPTII,S$GLB,, | Performed by: FAMILY MEDICINE

## 2019-04-03 PROCEDURE — 99214 PR OFFICE/OUTPT VISIT, EST, LEVL IV, 30-39 MIN: ICD-10-PCS | Mod: S$GLB,,, | Performed by: FAMILY MEDICINE

## 2019-04-03 PROCEDURE — 99214 OFFICE O/P EST MOD 30 MIN: CPT | Mod: S$GLB,,, | Performed by: FAMILY MEDICINE

## 2019-04-03 PROCEDURE — 3008F BODY MASS INDEX DOCD: CPT | Mod: CPTII,S$GLB,, | Performed by: FAMILY MEDICINE

## 2019-04-03 RX ORDER — SERTRALINE HYDROCHLORIDE 50 MG/1
50 TABLET, FILM COATED ORAL DAILY
Qty: 30 TABLET | Refills: 11 | Status: SHIPPED | OUTPATIENT
Start: 2019-04-03 | End: 2020-06-05 | Stop reason: SDUPTHER

## 2019-04-03 RX ORDER — BACLOFEN 20 MG/1
TABLET ORAL
Refills: 0 | COMMUNITY
Start: 2019-03-22 | End: 2019-05-13

## 2019-04-03 NOTE — PROGRESS NOTES
Brook Burdick  04/04/2019  [id number]    Adamaris Arguello MD  Patient Care Team:  Adamaris Arguello MD as PCP - General (Family Medicine)  Alice Walker MD as Obstetrician (Obstetrics)  ORenetta Chan III, MD (Obstetrics and Gynecology)  Has the patient seen any provider outside of the [facility] network since the last visit? (no). If yes, HIPPA forms completed and records requested.        Visit Type:a scheduled routine follow-up visit    Chief Complaint:  Chief Complaint   Patient presents with    Follow-up       History of Present Illness:  38 year old here for follow up.  SHe was x/o palpitations last visit.  Holter ordered, reviewed. No arrythmia.    Patient has lost about 20 pounds with Adipex.    TSH was fine in fall last year.  Suspect sensation is due to Anxiety.   I will do holter, if negative, then we can revisit starting Zoloft if patient willing.     COunseling on diet and exercise and weight loss.  Last month of 3/3 Rx for adipex given today since EKG fine. Encouraged to conitnue diet changes and exercise     Referred to PT for her back and knee pain.    She has history of anxiety, and feels that its worse now.  We discussed Zoloft on last visit, and wanted to see if we need to restart. She has worry, irritability.  Family member note. Overwhelmed.      History:  Past Medical History:   Diagnosis Date    IBS (irritable bowel syndrome)     Pancreatitis      Past Surgical History:   Procedure Laterality Date    APPENDECTOMY      BILE DUCT EXPLORATION      CHOLECYSTECTOMY       Family History   Problem Relation Age of Onset    Atrial fibrillation Mother     Diabetes Father     Hypertension Father     Cancer Maternal Grandfather      Social History     Socioeconomic History    Marital status: Single     Spouse name: Not on file    Number of children: Not on file    Years of education: Not on file    Highest education level: Not on file   Occupational History    Not on file    Social Needs    Financial resource strain: Not on file    Food insecurity:     Worry: Not on file     Inability: Not on file    Transportation needs:     Medical: Not on file     Non-medical: Not on file   Tobacco Use    Smoking status: Never Smoker    Smokeless tobacco: Never Used   Substance and Sexual Activity    Alcohol use: No     Frequency: Never    Drug use: No    Sexual activity: Never   Lifestyle    Physical activity:     Days per week: Not on file     Minutes per session: Not on file    Stress: Not on file   Relationships    Social connections:     Talks on phone: Not on file     Gets together: Not on file     Attends Mor[employer] service: Not on file     Active member of club or organization: Not on file     Attends meetings of clubs or organizations: Not on file     Relationship status: Not on file   Other Topics Concern    Not on file   Social History Narrative    Not on file     Patient Active Problem List   Diagnosis    Chronic constipation    Family history of colonic polyps    Insulin resistance    Gastroesophageal reflux disease without esophagitis    Severe obesity (BMI >= 40)     Review of patient's allergies indicates:   Allergen Reactions    Penicillins Hives    Tramadol Itching       The following were reviewed at this visit: active problem list, medication list, allergies, family history, social history, and health maintenance.    Medications:  Current Outpatient Medications on File Prior to Visit   Medication Sig Dispense Refill    baclofen (LIORESAL) 20 MG tablet TK 1 T PO TID PRF MUSCLE SPASM  0    lubiprostone (AMITIZA) 8 MCG Cap Take 1 capsule (8 mcg total) by mouth 2 (two) times daily. 60 capsule 3    meloxicam (MOBIC) 7.5 MG tablet Take 1 tablet (7.5 mg total) by mouth once daily. 30 tablet 0    pantoprazole (PROTONIX) 40 MG tablet Take 1 tablet (40 mg total) by mouth once daily. 90 tablet 0     No current facility-administered medications on file prior to visit.         Medications have been reviewed and reconciled with patient at this visit.  Barriers to medications present (no)    Adverse reactions to current medications (no)    Over the counter medications reviewed (Yes ), and if needed added to active Medication list at this visit.     Exam:  Wt Readings from Last 3 Encounters:   04/03/19 (!) 146.4 kg (322 lb 12.1 oz)   02/20/19 (!) 146.6 kg (323 lb 3.1 oz)   12/03/18 (!) 154.7 kg (341 lb 0.8 oz)     Temp Readings from Last 3 Encounters:   04/03/19 96.9 °F (36.1 °C) (Tympanic)   02/20/19 97.2 °F (36.2 °C) (Tympanic)   12/03/18 97.3 °F (36.3 °C)     BP Readings from Last 3 Encounters:   04/03/19 116/74   02/20/19 110/70   12/03/18 122/78     Pulse Readings from Last 3 Encounters:   04/03/19 100   02/20/19 76   12/03/18 85     Body mass index is 43.77 kg/m².      Review of Systems   Constitutional: Negative.  Negative for chills and fever.   HENT: Negative.  Negative for congestion, sinus pain and sore throat.    Eyes: Negative for blurred vision and double vision.   Respiratory: Negative for cough, sputum production, shortness of breath and wheezing.    Cardiovascular: Negative for chest pain, palpitations and leg swelling.   Gastrointestinal: Negative for abdominal pain, constipation, diarrhea, heartburn, nausea and vomiting.   Genitourinary: Negative.    Musculoskeletal: Positive for back pain and joint pain.   Skin: Negative.  Negative for rash.   Neurological: Negative.    Endo/Heme/Allergies: Negative.  Negative for polydipsia. Does not bruise/bleed easily.   Psychiatric/Behavioral: Negative for depression and substance abuse. The patient is nervous/anxious.      Physical Exam   Constitutional: She is oriented to person, place, and time. She appears well-developed and well-nourished. No distress.   HENT:   Head: Normocephalic and atraumatic.   Right Ear: External ear normal.   Left Ear: External ear normal.   Nose: Nose normal.   Mouth/Throat: Oropharynx is clear and  moist. No oropharyngeal exudate.   Eyes: Pupils are equal, round, and reactive to light. Conjunctivae and EOM are normal. Right eye exhibits no discharge. Left eye exhibits no discharge.   Neck: Normal range of motion. Neck supple. No thyromegaly present.   Cardiovascular: Normal rate, regular rhythm, normal heart sounds and intact distal pulses.   No murmur heard.  Pulmonary/Chest: Effort normal and breath sounds normal. No respiratory distress. She has no wheezes.   Abdominal: Soft. Bowel sounds are normal. She exhibits no distension and no mass. There is no tenderness.   Musculoskeletal: Normal range of motion. She exhibits no edema.   Lymphadenopathy:     She has no cervical adenopathy.   Neurological: She is alert and oriented to person, place, and time. No cranial nerve deficit.   Skin: Capillary refill takes less than 2 seconds. She is not diaphoretic.   Psychiatric: She has a normal mood and affect. Her behavior is normal. Judgment and thought content normal.   Nursing note and vitals reviewed.      Laboratory Reviewed ({Yes)  Lab Results   Component Value Date    WBC 6.14 08/28/2007    HGB 13.1 08/28/2007    HCT 39.3 08/28/2007     08/28/2007    CHOL 211 (H) 08/28/2007    TRIG 92 08/28/2007    HDL 36 (L) 08/28/2007    ALT 33 (H) 08/28/2007    AST 22 08/28/2007     08/28/2007    K 4.1 08/28/2007     08/28/2007    CREATININE 0.7 08/28/2007    BUN 14 08/28/2007    CO2 20 (L) 08/28/2007    TSH 1.8 08/28/2007       Allegra was seen today for follow-up.    Diagnoses and all orders for this visit:    Anxiety  -     sertraline (ZOLOFT) 50 MG tablet; Take 1 tablet (50 mg total) by mouth once daily.    Severe obesity (BMI >= 40)  -     naltrexone-bupropion (CONTRAVE) 8-90 mg TbSR; Take 2 tablets by mouth 2 (two) times daily.    Start Zoloft  Continue PT for back and knee pain.    Continue weight loss efforts. Discussed Contrave, can transition to this since completed 90 days of Adipex.  Increase  exercise.    Recheck Zoloft effects at 3 months              Care Plan/Goals: Reviewed  (Yes)  Goals     None          Follow up: Follow up in about 3 months (around 7/3/2019).    After visit summary was printed and given to patient upon discharge today.  Patient goals and care plan are included in After Visit Summary.

## 2019-05-13 ENCOUNTER — OFFICE VISIT (OUTPATIENT)
Dept: INTERNAL MEDICINE | Facility: CLINIC | Age: 39
End: 2019-05-13
Payer: COMMERCIAL

## 2019-05-13 VITALS
TEMPERATURE: 97 F | HEIGHT: 72 IN | SYSTOLIC BLOOD PRESSURE: 126 MMHG | OXYGEN SATURATION: 98 % | BODY MASS INDEX: 39.68 KG/M2 | DIASTOLIC BLOOD PRESSURE: 74 MMHG | HEART RATE: 91 BPM | WEIGHT: 293 LBS

## 2019-05-13 DIAGNOSIS — R59.9 LYMPH NODE ENLARGEMENT: ICD-10-CM

## 2019-05-13 DIAGNOSIS — H60.502 ACUTE OTITIS EXTERNA OF LEFT EAR, UNSPECIFIED TYPE: Primary | ICD-10-CM

## 2019-05-13 PROCEDURE — 99212 PR OFFICE/OUTPT VISIT, EST, LEVL II, 10-19 MIN: ICD-10-PCS | Mod: 25,S$GLB,, | Performed by: FAMILY MEDICINE

## 2019-05-13 PROCEDURE — 99212 OFFICE O/P EST SF 10 MIN: CPT | Mod: 25,S$GLB,, | Performed by: FAMILY MEDICINE

## 2019-05-13 PROCEDURE — 3008F PR BODY MASS INDEX (BMI) DOCUMENTED: ICD-10-PCS | Mod: CPTII,S$GLB,, | Performed by: FAMILY MEDICINE

## 2019-05-13 PROCEDURE — 3008F BODY MASS INDEX DOCD: CPT | Mod: CPTII,S$GLB,, | Performed by: FAMILY MEDICINE

## 2019-05-13 PROCEDURE — 96372 PR INJECTION,THERAP/PROPH/DIAG2ST, IM OR SUBCUT: ICD-10-PCS | Mod: S$GLB,,, | Performed by: FAMILY MEDICINE

## 2019-05-13 PROCEDURE — 96372 THER/PROPH/DIAG INJ SC/IM: CPT | Mod: S$GLB,,, | Performed by: FAMILY MEDICINE

## 2019-05-13 PROCEDURE — 99999 PR PBB SHADOW E&M-EST. PATIENT-LVL III: CPT | Mod: PBBFAC,,, | Performed by: FAMILY MEDICINE

## 2019-05-13 PROCEDURE — 99999 PR PBB SHADOW E&M-EST. PATIENT-LVL III: ICD-10-PCS | Mod: PBBFAC,,, | Performed by: FAMILY MEDICINE

## 2019-05-13 RX ORDER — CIPROFLOXACIN AND DEXAMETHASONE 3; 1 MG/ML; MG/ML
4 SUSPENSION/ DROPS AURICULAR (OTIC) 2 TIMES DAILY
Qty: 7.5 ML | Refills: 0 | Status: SHIPPED | OUTPATIENT
Start: 2019-05-13 | End: 2019-10-10

## 2019-05-13 RX ORDER — BETAMETHASONE SODIUM PHOSPHATE AND BETAMETHASONE ACETATE 3; 3 MG/ML; MG/ML
9 INJECTION, SUSPENSION INTRA-ARTICULAR; INTRALESIONAL; INTRAMUSCULAR; SOFT TISSUE
Status: COMPLETED | OUTPATIENT
Start: 2019-05-13 | End: 2019-05-13

## 2019-05-13 RX ADMIN — BETAMETHASONE SODIUM PHOSPHATE AND BETAMETHASONE ACETATE 9 MG: 3; 3 INJECTION, SUSPENSION INTRA-ARTICULAR; INTRALESIONAL; INTRAMUSCULAR; SOFT TISSUE at 04:05

## 2019-05-13 NOTE — PROGRESS NOTES
Brook Burdick  05/13/2019  [id number]    Adamaris Arguello MD  Patient Care Team:  Adamaris Arguello MD as PCP - General (Family Medicine)  Alice Walker MD as Obstetrician (Obstetrics)  Yue Chan III, MD (Obstetrics and Gynecology)        Chief Complaint:  Chief Complaint   Patient presents with    left ear pain       History of Present Illness:  This work-in patient states that she has had left ear pain that the decreased hearing for the past 2 weeks.  She says it was preceded nasal congestion.  He was treated at urgent care week ago but has finished the Z-Richard they gave her without any improvement in her says symptoms.  No known fever.  Has not been on ear drops or had any steroid recently.        History:  Past Medical History:   Diagnosis Date    IBS (irritable bowel syndrome)     Pancreatitis      Past Surgical History:   Procedure Laterality Date    APPENDECTOMY      BILE DUCT EXPLORATION      CHOLECYSTECTOMY       Family History   Problem Relation Age of Onset    Atrial fibrillation Mother     Diabetes Father     Hypertension Father     Cancer Maternal Grandfather      Social History     Socioeconomic History    Marital status: Single     Spouse name: Not on file    Number of children: Not on file    Years of education: Not on file    Highest education level: Not on file   Occupational History    Not on file   Social Needs    Financial resource strain: Not hard at all    Food insecurity:     Worry: Never true     Inability: Never true    Transportation needs:     Medical: No     Non-medical: No   Tobacco Use    Smoking status: Never Smoker    Smokeless tobacco: Never Used   Substance and Sexual Activity    Alcohol use: No     Frequency: Monthly or less     Drinks per session: 1 or 2     Binge frequency: Less than monthly    Drug use: No    Sexual activity: Never   Lifestyle    Physical activity:     Days per week: 2 days     Minutes per session: 10 min     Stress: To some extent   Relationships    Social connections:     Talks on phone: More than three times a week     Gets together: Twice a week     Attends Sikh service: Not on file     Active member of club or organization: Yes     Attends meetings of clubs or organizations: More than 4 times per year     Relationship status:    Other Topics Concern    Not on file   Social History Narrative    Not on file     Patient Active Problem List   Diagnosis    Chronic constipation    Family history of colonic polyps    Insulin resistance    Gastroesophageal reflux disease without esophagitis    Severe obesity (BMI >= 40)     Review of patient's allergies indicates:   Allergen Reactions    Penicillins Hives       The following were reviewed at this visit: active problem list, medication list, allergies, family history, social history, and health maintenance.    Medications:  Current Outpatient Medications on File Prior to Visit   Medication Sig Dispense Refill    meloxicam (MOBIC) 7.5 MG tablet Take 1 tablet (7.5 mg total) by mouth once daily. 30 tablet 0    pantoprazole (PROTONIX) 40 MG tablet Take 1 tablet (40 mg total) by mouth once daily. 90 tablet 0    sertraline (ZOLOFT) 50 MG tablet Take 1 tablet (50 mg total) by mouth once daily. 30 tablet 11    [DISCONTINUED] baclofen (LIORESAL) 20 MG tablet TK 1 T PO TID PRF MUSCLE SPASM  0    [DISCONTINUED] lubiprostone (AMITIZA) 8 MCG Cap Take 1 capsule (8 mcg total) by mouth 2 (two) times daily. 60 capsule 3    [DISCONTINUED] naltrexone-bupropion (CONTRAVE) 8-90 mg TbSR Take 2 tablets by mouth 2 (two) times daily. 180 tablet 2     No current facility-administered medications on file prior to visit.        Medications have been reviewed and reconciled with patient at this visit.      Exam:  Wt Readings from Last 3 Encounters:   05/13/19 (!) 147.7 kg (325 lb 9.9 oz)   04/03/19 (!) 146.4 kg (322 lb 12.1 oz)   02/20/19 (!) 146.6 kg (323 lb 3.1 oz)     Temp  Readings from Last 3 Encounters:   05/13/19 97.2 °F (36.2 °C) (Tympanic)   04/03/19 96.9 °F (36.1 °C) (Tympanic)   02/20/19 97.2 °F (36.2 °C) (Tympanic)     BP Readings from Last 3 Encounters:   05/13/19 126/74   04/03/19 116/74   02/20/19 110/70     Pulse Readings from Last 3 Encounters:   05/13/19 91   04/03/19 100   02/20/19 76     Body mass index is 44.16 kg/m².      Review of Systems   Constitutional: Negative for chills, fever and weight loss.   Respiratory: Negative for shortness of breath.    Cardiovascular: Negative for chest pain and palpitations.     Physical Exam-alert and oriented well-nourished well-developed, ambulatory and in no acute distress  HEENT-no nasal discharge  Right ear canal clear  Left ear canal is swollen almost closed and is tender to palpation.  No discharge noted at present and TM is partially visible and intact.  Pharynx-mild postnasal drip  Neck is supple    Heart regular rate and rhythm  Lungs clear   Approximately 3 cm tender pre auricular node is present.    Discussion-I recommended to patient to in the future not try to  mechanically clean inside the ear canal.  We have discussed effective means to use the antibiotic/cortisone drops.  The patient is to let us know if she is not doing significantly better in 2-3 days, it is possible p.o. antibiotic may need to be added and /or top ical antifungal medication added.    There are no diagnoses linked to this encounter.              Follow up: Follow up if symptoms worsen or fail to improve.    After visit summary was printed and given to patient upon discharge today.  Patient goals and care plan are included in After Visit Summary.

## 2019-06-12 ENCOUNTER — PATIENT OUTREACH (OUTPATIENT)
Dept: ADMINISTRATIVE | Facility: HOSPITAL | Age: 39
End: 2019-06-12

## 2019-07-30 NOTE — PROGRESS NOTES
Brook Burdick  07/31/2019  [id number]    Adamaris Arguello MD  Patient Care Team:  Adamaris Arguello MD as PCP - General (Family Medicine)  Alice Walker MD as Obstetrician (Obstetrics)  Yue Chan III, MD (Obstetrics and Gynecology)  Has the patient seen any provider outside of the [facility] network since the last visit? (no). If yes, HIPPA forms completed and records requested.        Visit Type:a scheduled routine follow-up visit    Chief Complaint:  Chief Complaint   Patient presents with    Pelvic Pain     patient states that it started at the end on May. she is finished with PT       History of Present Illness:She has history of anxiety, and feels that its worse now.  We discussed Zoloft on last visit, and wanted to see if we need to restart. She has worry, irritability.  Family member note. Overwhelmed.    She has completed Apidex for 90 days. Did loose weight. Counseled on diet and exercise. She was place in PT of her lower back and knee pain, which is a barrier to have her structured exercise.    She stopped PT because it didn't help. She reports the knees are still hurts.   She is having some pelvic pain, that will radiate to her hip.  PT did tell her that she had a pelvic tilt. She reports he told her that is why she had some issues with her sciatic nerve.   She reports more in her left hip, but she can have some issues in her right, but mostly in the left.  Denies overt back pain, but not every day. More pain in pelvis.  She stopped therapy because it was hurting her more.    She will take Ibuprofen or a Mobic, but doesn't feel that she gets more relief.  She denies any urinary leakage.                Answers for HPI/ROS submitted by the patient on 7/30/2019   activity change: No  unexpected weight change: No  rhinorrhea: No  trouble swallowing: No  visual disturbance: No  chest tightness: No  polyuria: No  difficulty urinating: No  menstrual problem: No  joint swelling:  No  arthralgias: Yes  confusion: No  dysphoric mood: Yes        History:  Past Medical History:   Diagnosis Date    IBS (irritable bowel syndrome)     Pancreatitis      Past Surgical History:   Procedure Laterality Date    APPENDECTOMY      BILE DUCT EXPLORATION      CHOLECYSTECTOMY       Family History   Problem Relation Age of Onset    Atrial fibrillation Mother     Diabetes Father     Hypertension Father     Cancer Maternal Grandfather      Social History     Socioeconomic History    Marital status: Single     Spouse name: Not on file    Number of children: Not on file    Years of education: Not on file    Highest education level: Not on file   Occupational History    Not on file   Social Needs    Financial resource strain: Not hard at all    Food insecurity:     Worry: Never true     Inability: Never true    Transportation needs:     Medical: No     Non-medical: No   Tobacco Use    Smoking status: Never Smoker    Smokeless tobacco: Never Used   Substance and Sexual Activity    Alcohol use: No     Frequency: Monthly or less     Drinks per session: 1 or 2     Binge frequency: Less than monthly    Drug use: No    Sexual activity: Never   Lifestyle    Physical activity:     Days per week: 5 days     Minutes per session: 60 min    Stress: To some extent   Relationships    Social connections:     Talks on phone: More than three times a week     Gets together: More than three times a week     Attends [employer] service: Not on file     Active member of club or organization: Yes     Attends meetings of clubs or organizations: More than 4 times per year     Relationship status:    Other Topics Concern    Not on file   Social History Narrative    Not on file     Patient Active Problem List   Diagnosis    Chronic constipation    Family history of colonic polyps    Insulin resistance    Gastroesophageal reflux disease without esophagitis    Severe obesity (BMI >= 40)    Lymph node  enlargement    Acute otitis externa of left ear     Review of patient's allergies indicates:   Allergen Reactions    Penicillins Hives       The following were reviewed at this visit: active problem list, medication list, allergies, family history, social history, and health maintenance.    Medications:  Current Outpatient Medications on File Prior to Visit   Medication Sig Dispense Refill    ciprofloxacin-dexamethasone 0.3-0.1% (CIPRODEX) 0.3-0.1 % DrpS Place 4 drops into the left ear 2 (two) times daily. 7.5 mL 0    meloxicam (MOBIC) 7.5 MG tablet Take 1 tablet (7.5 mg total) by mouth once daily. 30 tablet 0    sertraline (ZOLOFT) 50 MG tablet Take 1 tablet (50 mg total) by mouth once daily. 30 tablet 11    pantoprazole (PROTONIX) 40 MG tablet Take 1 tablet (40 mg total) by mouth once daily. 90 tablet 0     No current facility-administered medications on file prior to visit.        Medications have been reviewed and reconciled with patient at this visit.  Barriers to medications present (no)    Adverse reactions to current medications (no)    Over the counter medications reviewed (Yes ), and if needed added to active Medication list at this visit.     Exam:  Wt Readings from Last 3 Encounters:   07/31/19 (!) 150.5 kg (331 lb 12.7 oz)   05/13/19 (!) 147.7 kg (325 lb 9.9 oz)   04/03/19 (!) 146.4 kg (322 lb 12.1 oz)     Temp Readings from Last 3 Encounters:   07/31/19 96.3 °F (35.7 °C) (Tympanic)   05/13/19 97.2 °F (36.2 °C) (Tympanic)   04/03/19 96.9 °F (36.1 °C) (Tympanic)     BP Readings from Last 3 Encounters:   07/31/19 118/76   05/13/19 126/74   04/03/19 116/74     Pulse Readings from Last 3 Encounters:   07/31/19 95   05/13/19 91   04/03/19 100     Body mass index is 45 kg/m².      Review of Systems   HENT: Negative for hearing loss.    Eyes: Negative for discharge.   Respiratory: Negative for wheezing.    Cardiovascular: Negative for chest pain and palpitations.   Gastrointestinal: Negative for blood in  stool, constipation, diarrhea and vomiting.   Genitourinary: Negative for dysuria and hematuria.   Musculoskeletal: Positive for joint pain. Negative for neck pain.   Neurological: Positive for weakness. Negative for headaches.   Endo/Heme/Allergies: Positive for polydipsia.   Psychiatric/Behavioral: Negative for depression.     Physical Exam   Constitutional: She is oriented to person, place, and time. She appears well-developed and well-nourished. No distress.   HENT:   Head: Normocephalic and atraumatic.   Right Ear: External ear normal.   Left Ear: External ear normal.   Nose: Nose normal.   Mouth/Throat: Oropharynx is clear and moist. No oropharyngeal exudate.   Eyes: Pupils are equal, round, and reactive to light. Conjunctivae and EOM are normal. Right eye exhibits no discharge. Left eye exhibits no discharge.   Neck: Normal range of motion. Neck supple. No thyromegaly present.   Cardiovascular: Normal rate, regular rhythm, normal heart sounds and intact distal pulses.   No murmur heard.  Pulmonary/Chest: Effort normal and breath sounds normal. No respiratory distress. She has no wheezes.   Abdominal: Soft. Bowel sounds are normal. She exhibits no distension and no mass. There is no tenderness.   Musculoskeletal: Normal range of motion. She exhibits tenderness. She exhibits no edema.   Pelvic pain, Obutorator pain, more to left hip.      Lymphadenopathy:     She has no cervical adenopathy.   Neurological: She is alert and oriented to person, place, and time. No cranial nerve deficit.   Skin: Capillary refill takes less than 2 seconds. She is not diaphoretic.   Psychiatric: She has a normal mood and affect. Her behavior is normal. Judgment and thought content normal.   Nursing note and vitals reviewed.      Laboratory Reviewed ({N/A)  Lab Results   Component Value Date    WBC 6.14 08/28/2007    HGB 13.1 08/28/2007    HCT 39.3 08/28/2007     08/28/2007    CHOL 197 04/20/2018    TRIG 114 04/20/2018    HDL  31 04/20/2018    ALT 33 (H) 08/28/2007    AST 22 08/28/2007     08/28/2007    K 4.1 08/28/2007     08/28/2007    CREATININE 0.7 08/28/2007    BUN 14 08/28/2007    CO2 20 (L) 08/28/2007    TSH 1.8 08/28/2007       Allegra was seen today for pelvic pain.    Diagnoses and all orders for this visit:    Pain in pelvis  -     X-Ray Pelvis Complete min 3 views; Future  -     Ambulatory Referral to Pain Clinic  -     Ambulatory consult to Physical Therapy      Discussed diet and exercise with patient.  She is not on Adipex. She has gained weight.            Care Plan/Goals: Reviewed  (N/A)  Goals     None          Follow up: No follow-ups on file.    After visit summary was printed and given to patient upon discharge today.  Patient goals and care plan are included in After Visit Summary.    Answers for HPI/ROS submitted by the patient on 7/30/2019   activity change: No  unexpected weight change: No  rhinorrhea: No  trouble swallowing: No  visual disturbance: No  chest tightness: No  polyuria: No  difficulty urinating: No  menstrual problem: No  joint swelling: No  arthralgias: Yes  confusion: No  dysphoric mood: Yes

## 2019-07-31 ENCOUNTER — HOSPITAL ENCOUNTER (OUTPATIENT)
Dept: RADIOLOGY | Facility: HOSPITAL | Age: 39
Discharge: HOME | End: 2019-07-31
Attending: FAMILY MEDICINE
Payer: COMMERCIAL

## 2019-07-31 ENCOUNTER — OFFICE VISIT (OUTPATIENT)
Dept: INTERNAL MEDICINE | Facility: CLINIC | Age: 39
End: 2019-07-31
Payer: COMMERCIAL

## 2019-07-31 VITALS
HEIGHT: 72 IN | HEART RATE: 95 BPM | BODY MASS INDEX: 39.68 KG/M2 | SYSTOLIC BLOOD PRESSURE: 118 MMHG | DIASTOLIC BLOOD PRESSURE: 76 MMHG | WEIGHT: 293 LBS | OXYGEN SATURATION: 96 % | TEMPERATURE: 96 F

## 2019-07-31 DIAGNOSIS — R10.2 PAIN IN PELVIS: Primary | ICD-10-CM

## 2019-07-31 DIAGNOSIS — R10.2 PAIN IN PELVIS: ICD-10-CM

## 2019-07-31 PROBLEM — R59.9 LYMPH NODE ENLARGEMENT: Status: RESOLVED | Noted: 2019-05-13 | Resolved: 2019-07-31

## 2019-07-31 PROBLEM — H60.502 ACUTE OTITIS EXTERNA OF LEFT EAR: Status: RESOLVED | Noted: 2019-05-13 | Resolved: 2019-07-31

## 2019-07-31 PROCEDURE — 99214 PR OFFICE/OUTPT VISIT, EST, LEVL IV, 30-39 MIN: ICD-10-PCS | Mod: S$GLB,,, | Performed by: FAMILY MEDICINE

## 2019-07-31 PROCEDURE — 73502 X-RAY EXAM HIP UNI 2-3 VIEWS: CPT | Mod: TC,LT

## 2019-07-31 PROCEDURE — 3008F BODY MASS INDEX DOCD: CPT | Mod: CPTII,S$GLB,, | Performed by: FAMILY MEDICINE

## 2019-07-31 PROCEDURE — 73502 XR HIP 2 VIEW LEFT: ICD-10-PCS | Mod: 26,LT,, | Performed by: RADIOLOGY

## 2019-07-31 PROCEDURE — 3008F PR BODY MASS INDEX (BMI) DOCUMENTED: ICD-10-PCS | Mod: CPTII,S$GLB,, | Performed by: FAMILY MEDICINE

## 2019-07-31 PROCEDURE — 99999 PR PBB SHADOW E&M-EST. PATIENT-LVL III: ICD-10-PCS | Mod: PBBFAC,,, | Performed by: FAMILY MEDICINE

## 2019-07-31 PROCEDURE — 99999 PR PBB SHADOW E&M-EST. PATIENT-LVL III: CPT | Mod: PBBFAC,,, | Performed by: FAMILY MEDICINE

## 2019-07-31 PROCEDURE — 73502 X-RAY EXAM HIP UNI 2-3 VIEWS: CPT | Mod: 26,LT,, | Performed by: RADIOLOGY

## 2019-07-31 PROCEDURE — 99214 OFFICE O/P EST MOD 30 MIN: CPT | Mod: S$GLB,,, | Performed by: FAMILY MEDICINE

## 2019-08-06 ENCOUNTER — HOSPITAL ENCOUNTER (OUTPATIENT)
Dept: RADIOLOGY | Facility: HOSPITAL | Age: 39
Discharge: HOME | End: 2019-08-06
Attending: PAIN MEDICINE
Payer: COMMERCIAL

## 2019-08-06 ENCOUNTER — OFFICE VISIT (OUTPATIENT)
Dept: PAIN MEDICINE | Facility: CLINIC | Age: 39
End: 2019-08-06
Payer: COMMERCIAL

## 2019-08-06 VITALS
SYSTOLIC BLOOD PRESSURE: 120 MMHG | HEIGHT: 72 IN | WEIGHT: 293 LBS | BODY MASS INDEX: 39.68 KG/M2 | HEART RATE: 65 BPM | DIASTOLIC BLOOD PRESSURE: 74 MMHG

## 2019-08-06 DIAGNOSIS — R10.2 PELVIC PAIN: ICD-10-CM

## 2019-08-06 DIAGNOSIS — M47.816 LUMBAR SPONDYLOSIS: ICD-10-CM

## 2019-08-06 DIAGNOSIS — R10.2 PELVIC PAIN: Primary | ICD-10-CM

## 2019-08-06 PROCEDURE — 99204 PR OFFICE/OUTPT VISIT, NEW, LEVL IV, 45-59 MIN: ICD-10-PCS | Mod: S$GLB,,, | Performed by: PAIN MEDICINE

## 2019-08-06 PROCEDURE — 3008F BODY MASS INDEX DOCD: CPT | Mod: CPTII,S$GLB,, | Performed by: PAIN MEDICINE

## 2019-08-06 PROCEDURE — 99204 OFFICE O/P NEW MOD 45 MIN: CPT | Mod: S$GLB,,, | Performed by: PAIN MEDICINE

## 2019-08-06 PROCEDURE — 99999 PR PBB SHADOW E&M-EST. PATIENT-LVL III: CPT | Mod: PBBFAC,,, | Performed by: PAIN MEDICINE

## 2019-08-06 PROCEDURE — 72100 X-RAY EXAM L-S SPINE 2/3 VWS: CPT | Mod: 26,,, | Performed by: RADIOLOGY

## 2019-08-06 PROCEDURE — 99999 PR PBB SHADOW E&M-EST. PATIENT-LVL III: ICD-10-PCS | Mod: PBBFAC,,, | Performed by: PAIN MEDICINE

## 2019-08-06 PROCEDURE — 73562 X-RAY EXAM OF KNEE 3: CPT | Mod: TC,50

## 2019-08-06 PROCEDURE — 3008F PR BODY MASS INDEX (BMI) DOCUMENTED: ICD-10-PCS | Mod: CPTII,S$GLB,, | Performed by: PAIN MEDICINE

## 2019-08-06 PROCEDURE — 73562 X-RAY EXAM OF KNEE 3: CPT | Mod: 26,,, | Performed by: RADIOLOGY

## 2019-08-06 PROCEDURE — 72100 X-RAY EXAM L-S SPINE 2/3 VWS: CPT | Mod: TC

## 2019-08-06 PROCEDURE — 73562 XR KNEE ORTHO BILAT: ICD-10-PCS | Mod: 26,,, | Performed by: RADIOLOGY

## 2019-08-06 PROCEDURE — 72100 XR LUMBAR SPINE AP AND LATERAL: ICD-10-PCS | Mod: 26,,, | Performed by: RADIOLOGY

## 2019-08-06 RX ORDER — TOPIRAMATE 25 MG/1
25 TABLET ORAL 2 TIMES DAILY
Qty: 60 TABLET | Refills: 3 | Status: SHIPPED | OUTPATIENT
Start: 2019-08-06 | End: 2019-10-11 | Stop reason: SDUPTHER

## 2019-08-06 NOTE — LETTER
August 6, 2019      Adamaris Arguello MD  [address] [facility] [address]           [facility]  [address] [facility] [address]  Phone: [phone]  Fax: [phone]          Patient: Brook Burdick   MR Number: [id number]   YOB: 1980   Date of Visit: 8/6/2019       Dear Dr. Adamaris Arguello:    Thank you for referring Brook Burdick to me for evaluation. Attached you will find relevant portions of my assessment and plan of care.    If you have questions, please do not hesitate to call me. I look forward to following Brook Burdick along with you.    Sincerely,    Ozzy Sofia MD    Enclosure  CC:  No Recipients    If you would like to receive this communication electronically, please contact [email or web address] or [phone] to request more information on [facility] access.    For providers and/or their staff who would like to refer a patient to [facility], please contact us through our one-stop-shop provider referral line, [facility] [place], at [phone].    If you feel you have received this communication in error or would no longer like to receive these types of communications, please e-mail [email or web address]

## 2019-08-06 NOTE — PATIENT INSTRUCTIONS
-will start Topamax 25 mg twice daily; continue ibuprofen over-the-counter as needed  -on obtain x-rays of bilateral knees and lumbar spine today  -patient has referral for physical therapy for pelvis bilateral knees  -follow up in clinic in 8 weeks

## 2019-08-06 NOTE — PROGRESS NOTES
Chief Pain Complaint:  Pelvic Pain    History of Present Illness:   This patient is a 39 y.o. female who presents today complaining of the above noted pain/s. The patient describes the pain as follows.  Ms. Burdick is a new patient to clinic with complaints low back pain, pelvic pain, bilateral knee pain.  She has been having symptoms off and on for several years with the pelvic pain increasing in frequency becoming daily.  Today she rates her pain as a 7/10 she has difficulty describing her pelvic pain and describes it as hurting.  Primary located around the pubic symphysis in the vagina.  She has been having irregular cycles for years with a cycle in May of 2019 and she reports the previous menstrual cycle she had was in 2016.  She has had abdominal surgery however this all to place when she was approximately 6 years old.  She reports having pancreatitis at that time and ultimately had cholecystectomy, appendectomy and bile duct reconstruction.  She denies having had any other abdominal surgeries.  She denies having any changes with eating and with bowel movements.  She finds her symptoms are worse with walking and her somewhat improved with rest.  Her knee pain does improved with rest.  She has been physical therapy recently and will start again in approximately 6 days.  She has been using meloxicam which has not been beneficial and has found ibuprofen over-the-counter to be more helpful.  She denies having bowel bladder difficulties.    Previous Therapy:  Medications:Mobic and Ibuprofen, zoloft  Injections: Knee Joint Injection  Surgeries: None  Physical Therapy: Completed in the Past, will start this coming Monday    Past Surgical History:   Procedure Laterality Date    APPENDECTOMY      BILE DUCT EXPLORATION      CHOLECYSTECTOMY       Imaging / Labs / Studies (reviewed on 8/6/2019):    Review of Systems:  Review of Systems   Constitutional: Negative for fever.   Eyes: Negative for blurred vision.    Respiratory: Negative for cough and wheezing.    Cardiovascular: Negative for chest pain and orthopnea.   Gastrointestinal: Negative for constipation, diarrhea, nausea and vomiting.   Genitourinary: Negative for dysuria.   Musculoskeletal: Positive for back pain and joint pain.   Skin: Negative for itching and rash.   Neurological: Negative for weakness.   Endo/Heme/Allergies: Does not bruise/bleed easily.       Physical Exam:  /74 (BP Location: Left arm, Patient Position: Sitting, BP Method: Large (Automatic))   Pulse 65   Ht 6' (1.829 m)   Wt (!) 150 kg (330 lb 11 oz)   BMI 44.85 kg/m²  (reviewed on 2019)\  General    Constitutional: She is oriented to person, place, and time. She appears well-developed and well-nourished.   HENT:   Head: Normocephalic and atraumatic.   Eyes: EOM are normal.   Neck: Neck supple.   Pulmonary/Chest: Effort normal.   Abdominal: She exhibits no distension.   Neurological: She is alert and oriented to person, place, and time. No cranial nerve deficit.   Psychiatric: She has a normal mood and affect.     General Musculoskeletal Exam   Gait: normal       Right Knee Exam     Inspection   Swelling: absent    Tenderness   The patient is tender to palpation of the medial joint line.    Range of Motion   Extension: normal   Flexion: normal     Other   Sensation: normal    Comments:  Minimal crepitus with flexion and extension    Left Knee Exam     Inspection   Swelling: absent    Tenderness   The patient tender to palpation of the medial joint line.    Range of Motion   Extension: normal   Flexion: normal     Other   Sensation: normal    Comments:  Minimal crepitus with flexion and extension    Muscle Strength   Right Lower Extremity   Quadriceps:  5/5   Hamstrin/5   Left Lower Extremity   Quadriceps:  5/5   Hamstrin/5     Reflexes     Left Side  Quadriceps:  2+  Achilles:  2+    Right Side   Quadriceps:  2+  Achilles:  2+      Assessment  Knee OA  Lumbar  Spondylosis    1. 39 y.o. year old patient with PMH of   Past Medical History:   Diagnosis Date    IBS (irritable bowel syndrome)     Pancreatitis       presenting with pain located pelvis, lumbar spine, bilateral knees  2. Pain Generators / Etiology: Lumbar Spondylosis and Knee Osteorarthritis, pelvic pain  3. Failed Meds (E- Effective, NE- Not Effective):  Mobic-not effective; ibuprofen-effective  4. Physical Therapy - Completed in the Past and will restart in 6 days  5. Psychological comorbidities - None  6. Anticoagulants / Antiplatelets: None     PLAN:  1. Medications :  Will start Topamax 25 mg twice daily; she denies having history of glaucoma and nephrolithiasis;  2. PT - she has referral to start physical therapy for bilateral knees and pelvis; had long discussion with patient today regarding weight loss implants for weight loss; she is starting a plant based diet in addition to swimming and physical therapy  3. Psychological - continue all medications as prescribed  4. Labs - obtain  none  5. Imaging - obtain bilateral knee and lumbar x-ray  6. Interventions - schedule none  7. Referrals - none  8. Records - none  9. Follow up visit - follow up in clinic in 8 weeks  10. Patient Questions - answered all of the patient's questions regarding diagnosis, therapy, and treatment  11.  This condition does not require this patient to take time off of work    GILMAR Sofia MD  Interventional Pain  [facility] [place]

## 2019-08-12 ENCOUNTER — CLINICAL SUPPORT (OUTPATIENT)
Dept: REHABILITATION | Facility: HOSPITAL | Age: 39
End: 2019-08-12
Payer: COMMERCIAL

## 2019-08-12 DIAGNOSIS — K59.09 CHRONIC CONSTIPATION: Primary | ICD-10-CM

## 2019-08-12 DIAGNOSIS — R10.2 PELVIC PAIN IN FEMALE: ICD-10-CM

## 2019-08-12 PROCEDURE — 97161 PT EVAL LOW COMPLEX 20 MIN: CPT | Performed by: PHYSICAL THERAPIST

## 2019-08-12 PROCEDURE — 97535 SELF CARE MNGMENT TRAINING: CPT | Performed by: PHYSICAL THERAPIST

## 2019-08-12 PROCEDURE — 97140 MANUAL THERAPY 1/> REGIONS: CPT | Performed by: PHYSICAL THERAPIST

## 2019-08-12 NOTE — PROGRESS NOTES
"  OUTPATIENT PHYSICAL THERAPY EVALUATION        Patient: Brook MONTEMAYOR [facility] #:  [id number]    Date of treatment: 08/12/2019   Time in: 11:15  Time out: 12:00  Billable time: 45 min  # Visits: 1/20  Auth: 12/31/19  POC expiration: 9/12/19      HISTORY      Allegra is a 39 y.o. female evaluated on 08/12/2019    Physician:  Adamaris Arguello MD   Diagnosis:   Encounter Diagnoses   Name Primary?    Chronic constipation Yes    Pelvic pain in female       Treatment ordered: Physical Therapy  Medical History:   Past Medical History:   Diagnosis Date    IBS (irritable bowel syndrome)     Pancreatitis       Surgical History:   Past Surgical History:   Procedure Laterality Date    APPENDECTOMY      BILE DUCT EXPLORATION      CHOLECYSTECTOMY        Medications:   Current Outpatient Medications   Medication Sig    ciprofloxacin-dexamethasone 0.3-0.1% (CIPRODEX) 0.3-0.1 % DrpS Place 4 drops into the left ear 2 (two) times daily.    meloxicam (MOBIC) 7.5 MG tablet Take 1 tablet (7.5 mg total) by mouth once daily.    pantoprazole (PROTONIX) 40 MG tablet Take 1 tablet (40 mg total) by mouth once daily.    sertraline (ZOLOFT) 50 MG tablet Take 1 tablet (50 mg total) by mouth once daily.    topiramate (TOPAMAX) 25 MG tablet Take 1 tablet (25 mg total) by mouth 2 (two) times daily. Take 2 tabs nightly or twice daily if tolerated     No current facility-administered medications for this visit.        Allergies:   Review of patient's allergies indicates:   Allergen Reactions    Penicillins Hives        Precautions: none    OB/GYN History:  painful periods and pelvic pain- PCOS    Bladder/Bowel History: constant dribbling of urine, dribbling after urination, urinary incontinence, constipation/straining for movement and straining or pushing to empty bladder- had multiple surgeries in abdomin at age 6      SUBJECTIVE     Date of onset: several months ago- developled" vagina pain"  in lower abdominal region. receveed " Topomax and has less pain since beginning this medication but would like to get off medication  History of current complaint: 2-3 months ago  Patient's goals for therapy: stop pelvic pain- worst with walking- prolonged standing and walking- about 10-15 min  Pain: Patient reports 9/10, with 0 being the lowest and 10 being the highest.    Activities that cause symptoms: prolonged standing and defecating    Previous treatment included PT for back- which is better    Sexually active yes    Frequency of urination:   Daytime: every 1-2 hours           Nighttime: 5-7x    Difficulty initiating urine stream: No  Urine stream: strong  Complete emptying: Yes  Bladder leakage: No  Frequency of incidents: 0  Amount leaked (urine): 0    Frequency of bowel movements: once a day  Difficulty initiating BM: Yes  Quality/Shape of BM: ytpe 1-7  Colon leakage: No  Frequency of incidents: 0   Amount leaked (bowels): streaking/staining  Form of protection: none  Number of pads required in 24 hours: 0      Types of fluid intake: coffee, water  Diet:allergy to milk and peanuts??  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    OBJECTIVE     ORTHO SCREEN  Posture: WNL- morbid obsese  Pelvic alignment: no sign of deviations noted in supine  L/sp AROM- NT today    ABDOMINALS  Scarring: vertical scar from uuper abdoin to pubic bone and scar from peg tube on R lower abdomin  Diastasis: NT  Abdominal strength: NT  Chaperone: refused  Consent signed: yes but does not want a pelvic exam today    Palpation moderate increased tone and tenderness to B coccygeus, piriformis, obturator internus and pelvic floor mm externally. Moderate restrcted scar on L side of vertical abdominal scar than R and severe restriction and tenderenss/ sensitivity to R abdominal scar    TREATMENT          Manual Therapy to develop extensibility and desensitization for 8 minutes including: soft tissue  mobilization of abdominal scar        Education: instructed on general anatomy/physiology of urinary/bowel system; discussed plan of care with patient and parent/guardian; instructed in benefits/risks of treatment; instructed in alternative methods of treatment; instructed in risks of refusing treatment; patient a parent agreed to treatment plan.     Also educated in: bladder irritants, anatomy/physiology of pelvic floor, posture/body mechanices, bladder retraining, diaphragmatic breathing, double voiding techniques, kegels, proper bearing down techniques, fluid intake/dietary modifications and behavior modifications    ASSESSMENT      This is a 39 y.o. female referred to outpatient physical therapy and presents with a medical diagnosis of Pelvic pain. Patient will benefit from skilled physical therapy to improve mm extensibility of pelvic mm and abdominal to reduce pelvic pain nad urgency and frequency of voiding.    SEMG Problems: none  No skin irritation, erythema, or other adverse effects observed from electrode placement.    Educational/Spiritual/Cultural needs: none  Abuse/Neglect: no signs  Nutritional Status: aware of possible food allergies to avoid   Fall Risk: 0    Pt's spiritual, cultural and educational needs considered and pt agreeable to plan of care and goals as stated below:     Medical necessity is demonstrated by the following IMPAIRMENTS/PROMBLEMS     History  Co-morbidities and personal factors that may impact the plan of care Examination  Body Structures and Functions, activity limitations and participation restrictions that may impact the plan of care Clinical Presentation   Decision Making/ Complexity Score   Co-morbidities:                 Personal Factors:    Body Regions/Systems/Functions:    poor knowledge of body mechanics and posture, adhered abdominal scar, pelvic floor tenderness, increased tension of the pelvic muscles, increased frequency of urination, increased nocturia and unable  to co-contract or co-relax abdominal wall and pelvic floor muscles           Activity limitations:   Barriers to Learning: none  Environmental Barriers: none noted    Participation Restrictions:           low           PLAN    Frequency: 1-2x week  Duration: 8-12 visits  Short Term Goals: 1-4 weeks   Pt will verbalize improved awareness of PFM activity as palpated by PT in order to improve activity involvement with HEP.  Pt will report improved ability to manage bladder spasms appropriately 100% of the time for an improvement in urinary frequency/urgency.   Pt will report improved ability to sleep uninterrupted by excessive nocturia 5/7 days per week.     Long Term Goals: 5-12 weeks   Pt will report improved ability to tolerate standing > or = 30 with < or = 2/10 pain for improved ability to complete work related activities.  Pt will report improved ability to engage pelvic/abdominal brace with < or = 2/10 pain for improved tolerance of functional transfers/mobility.   Pt will report improved ability to tolerate standing > or = 30-1 hour without vaginal pressure for improved ability to complete work releasted activities and ADL.   Pt/family will be independent with HEP for continued self-management of symptoms.    Physical therapy will include: therapeutic exercises, neuromuscular re-education, manual therapy, patient/family education, dry needling and self care/home management      Therapist: Adrianne Sanders, PT  8/12/2019

## 2019-10-10 ENCOUNTER — OFFICE VISIT (OUTPATIENT)
Dept: INTERNAL MEDICINE | Facility: CLINIC | Age: 39
End: 2019-10-10
Payer: COMMERCIAL

## 2019-10-10 ENCOUNTER — LAB VISIT (OUTPATIENT)
Dept: LAB | Facility: HOSPITAL | Age: 39
End: 2019-10-10
Attending: FAMILY MEDICINE
Payer: COMMERCIAL

## 2019-10-10 VITALS
HEART RATE: 84 BPM | OXYGEN SATURATION: 97 % | WEIGHT: 293 LBS | TEMPERATURE: 96 F | DIASTOLIC BLOOD PRESSURE: 84 MMHG | HEIGHT: 72 IN | BODY MASS INDEX: 39.68 KG/M2 | SYSTOLIC BLOOD PRESSURE: 124 MMHG

## 2019-10-10 DIAGNOSIS — N92.6 IRREGULAR MENSES: Primary | ICD-10-CM

## 2019-10-10 DIAGNOSIS — R51.9 BILATERAL HEADACHES: ICD-10-CM

## 2019-10-10 DIAGNOSIS — N92.6 IRREGULAR MENSES: ICD-10-CM

## 2019-10-10 DIAGNOSIS — Z01.419 WELL WOMAN EXAM WITH ROUTINE GYNECOLOGICAL EXAM: ICD-10-CM

## 2019-10-10 LAB
BASOPHILS # BLD AUTO: 0.09 K/UL (ref 0–0.2)
BASOPHILS NFR BLD: 1.5 % (ref 0–1.9)
DIFFERENTIAL METHOD: NORMAL
EOSINOPHIL # BLD AUTO: 0.3 K/UL (ref 0–0.5)
EOSINOPHIL NFR BLD: 4.2 % (ref 0–8)
ERYTHROCYTE [DISTWIDTH] IN BLOOD BY AUTOMATED COUNT: 12.8 % (ref 11.5–14.5)
ESTIMATED AVG GLUCOSE: 114 MG/DL (ref 68–131)
HBA1C MFR BLD HPLC: 5.6 % (ref 4–5.6)
HCT VFR BLD AUTO: 39.2 % (ref 37–48.5)
HGB BLD-MCNC: 13 G/DL (ref 12–16)
IMM GRANULOCYTES # BLD AUTO: 0.02 K/UL (ref 0–0.04)
IMM GRANULOCYTES NFR BLD AUTO: 0.3 % (ref 0–0.5)
LYMPHOCYTES # BLD AUTO: 2.5 K/UL (ref 1–4.8)
LYMPHOCYTES NFR BLD: 40.5 % (ref 18–48)
MCH RBC QN AUTO: 30 PG (ref 27–31)
MCHC RBC AUTO-ENTMCNC: 33.2 G/DL (ref 32–36)
MCV RBC AUTO: 91 FL (ref 82–98)
MONOCYTES # BLD AUTO: 0.6 K/UL (ref 0.3–1)
MONOCYTES NFR BLD: 10.3 % (ref 4–15)
NEUTROPHILS # BLD AUTO: 2.7 K/UL (ref 1.8–7.7)
NEUTROPHILS NFR BLD: 43.2 % (ref 38–73)
NRBC BLD-RTO: 0 /100 WBC
PLATELET # BLD AUTO: 327 K/UL (ref 150–350)
PMV BLD AUTO: 10.5 FL (ref 9.2–12.9)
RBC # BLD AUTO: 4.33 M/UL (ref 4–5.4)
TSH SERPL DL<=0.005 MIU/L-ACNC: 1.97 UIU/ML (ref 0.4–4)
WBC # BLD AUTO: 6.19 K/UL (ref 3.9–12.7)

## 2019-10-10 PROCEDURE — 83036 HEMOGLOBIN GLYCOSYLATED A1C: CPT

## 2019-10-10 PROCEDURE — 99999 PR PBB SHADOW E&M-EST. PATIENT-LVL III: CPT | Mod: PBBFAC,,, | Performed by: FAMILY MEDICINE

## 2019-10-10 PROCEDURE — 87624 HPV HI-RISK TYP POOLED RSLT: CPT

## 2019-10-10 PROCEDURE — 99214 PR OFFICE/OUTPT VISIT, EST, LEVL IV, 30-39 MIN: ICD-10-PCS | Mod: S$GLB,,, | Performed by: FAMILY MEDICINE

## 2019-10-10 PROCEDURE — 99214 OFFICE O/P EST MOD 30 MIN: CPT | Mod: S$GLB,,, | Performed by: FAMILY MEDICINE

## 2019-10-10 PROCEDURE — 88175 CYTOPATH C/V AUTO FLUID REDO: CPT

## 2019-10-10 PROCEDURE — 3008F BODY MASS INDEX DOCD: CPT | Mod: CPTII,S$GLB,, | Performed by: FAMILY MEDICINE

## 2019-10-10 PROCEDURE — 84443 ASSAY THYROID STIM HORMONE: CPT

## 2019-10-10 PROCEDURE — 99999 PR PBB SHADOW E&M-EST. PATIENT-LVL III: ICD-10-PCS | Mod: PBBFAC,,, | Performed by: FAMILY MEDICINE

## 2019-10-10 PROCEDURE — 85025 COMPLETE CBC W/AUTO DIFF WBC: CPT

## 2019-10-10 PROCEDURE — 36415 COLL VENOUS BLD VENIPUNCTURE: CPT

## 2019-10-10 PROCEDURE — 3008F PR BODY MASS INDEX (BMI) DOCUMENTED: ICD-10-PCS | Mod: CPTII,S$GLB,, | Performed by: FAMILY MEDICINE

## 2019-10-10 NOTE — PROGRESS NOTES
Brook Burdick  10/10/2019  [id number]    Adamaris Arguello MD  Patient Care Team:  Adamaris Arguello MD as PCP - General (Family Medicine)  Alice Walker MD as Obstetrician (Obstetrics)  Yue Chan III, MD (Obstetrics and Gynecology)  Has the patient seen any provider outside of the [facility] network since the last visit? (no). If yes, HIPPA forms completed and records requested.        Visit Type:an urgent visit for a new problem    Chief Complaint:  Chief Complaint   Patient presents with    Headache    Vaginal Bleeding     for about 3 to 4 weeks. it comes and goes       History of Present Illness:    She is here to discuss her HA. Pain management did Rx Topmamax      She recently saw Pain management. She reports Pelvic Pain.   She has been having irregular cycles for years with a cycle in May of 2019 and she reports the previous menstrual cycle she had was in 2016.  She has had abdominal surgery however this all to place when she was approximately 6 years old.  She reports having pancreatitis at that time and ultimately had cholecystectomy, appendectomy and bile duct reconstruction.    She has had vaginal bleeding, irregular with spotting.     Mobic didn't help her low back pain, pelvic pain, bilateral knee pain.     History:  Past Medical History:   Diagnosis Date    IBS (irritable bowel syndrome)     Pancreatitis      Past Surgical History:   Procedure Laterality Date    APPENDECTOMY      BILE DUCT EXPLORATION      CHOLECYSTECTOMY       Family History   Problem Relation Age of Onset    Atrial fibrillation Mother     Diabetes Father     Hypertension Father     Cancer Maternal Grandfather      Social History     Socioeconomic History    Marital status: Single     Spouse name: Not on file    Number of children: Not on file    Years of education: Not on file    Highest education level: Not on file   Occupational History    Not on file   Social Needs    Financial resource  strain: Not hard at all    Food insecurity:     Worry: Never true     Inability: Never true    Transportation needs:     Medical: No     Non-medical: No   Tobacco Use    Smoking status: Never Smoker    Smokeless tobacco: Never Used   Substance and Sexual Activity    Alcohol use: No     Frequency: Monthly or less     Drinks per session: 1 or 2     Binge frequency: Less than monthly    Drug use: No    Sexual activity: Never   Lifestyle    Physical activity:     Days per week: 5 days     Minutes per session: 60 min    Stress: To some extent   Relationships    Social connections:     Talks on phone: More than three times a week     Gets together: More than three times a week     Attends Yazidism service: Not on file     Active member of club or organization: Yes     Attends meetings of clubs or organizations: More than 4 times per year     Relationship status:    Other Topics Concern    Not on file   Social History Narrative    Not on file     Patient Active Problem List   Diagnosis    Chronic constipation    Family history of colonic polyps    Insulin resistance    Gastroesophageal reflux disease without esophagitis    Severe obesity (BMI >= 40)     Review of patient's allergies indicates:   Allergen Reactions    Penicillins Hives       The following were reviewed at this visit: active problem list, medication list, allergies, family history, social history, and health maintenance.    Medications:  Current Outpatient Medications on File Prior to Visit   Medication Sig Dispense Refill    sertraline (ZOLOFT) 50 MG tablet Take 1 tablet (50 mg total) by mouth once daily. 30 tablet 11    topiramate (TOPAMAX) 25 MG tablet Take 1 tablet (25 mg total) by mouth 2 (two) times daily. Take 2 tabs nightly or twice daily if tolerated 60 tablet 3    [DISCONTINUED] ciprofloxacin-dexamethasone 0.3-0.1% (CIPRODEX) 0.3-0.1 % DrpS Place 4 drops into the left ear 2 (two) times daily. (Patient not taking:  Reported on 10/10/2019) 7.5 mL 0    [DISCONTINUED] meloxicam (MOBIC) 7.5 MG tablet Take 1 tablet (7.5 mg total) by mouth once daily. (Patient not taking: Reported on 10/10/2019) 30 tablet 0    [DISCONTINUED] pantoprazole (PROTONIX) 40 MG tablet Take 1 tablet (40 mg total) by mouth once daily. (Patient not taking: Reported on 10/10/2019) 90 tablet 0     No current facility-administered medications on file prior to visit.        Medications have been reviewed and reconciled with patient at this visit.  Barriers to medications present (no)    Adverse reactions to current medications (no)    Over the counter medications reviewed (Yes ), and if needed added to active Medication list at this visit.     Exam:  Wt Readings from Last 3 Encounters:   10/10/19 (!) 147.8 kg (325 lb 13.4 oz)   08/06/19 (!) 150 kg (330 lb 11 oz)   07/31/19 (!) 150.5 kg (331 lb 12.7 oz)     Temp Readings from Last 3 Encounters:   10/10/19 96.3 °F (35.7 °C) (Tympanic)   07/31/19 96.3 °F (35.7 °C) (Tympanic)   05/13/19 97.2 °F (36.2 °C) (Tympanic)     BP Readings from Last 3 Encounters:   10/10/19 124/84   08/06/19 120/74   07/31/19 118/76     Pulse Readings from Last 3 Encounters:   10/10/19 84   08/06/19 65   07/31/19 95     Body mass index is 44.19 kg/m².      Review of Systems   HENT: Positive for hearing loss.    Eyes: Negative for discharge.   Respiratory: Negative for wheezing.    Cardiovascular: Positive for palpitations. Negative for chest pain.   Gastrointestinal: Positive for constipation and diarrhea. Negative for blood in stool and vomiting.   Genitourinary: Negative for dysuria and hematuria.   Musculoskeletal: Negative for neck pain.   Neurological: Positive for headaches. Negative for weakness.   Endo/Heme/Allergies: Negative for polydipsia.     Physical Exam   Constitutional: She is oriented to person, place, and time. She appears well-developed and well-nourished. No distress.   HENT:   Head: Normocephalic and atraumatic.   Right  Ear: External ear normal.   Left Ear: External ear normal.   Nose: Nose normal.   Mouth/Throat: Oropharynx is clear and moist. No oropharyngeal exudate.   Eyes: Pupils are equal, round, and reactive to light. Conjunctivae and EOM are normal. Right eye exhibits no discharge. Left eye exhibits no discharge.   Neck: Normal range of motion. Neck supple. No thyromegaly present.   Cardiovascular: Normal rate, regular rhythm, normal heart sounds and intact distal pulses.   No murmur heard.  Pulmonary/Chest: Effort normal and breath sounds normal. No respiratory distress. She has no wheezes.   Abdominal: Soft. Bowel sounds are normal. She exhibits no distension and no mass. There is no tenderness.   Musculoskeletal: Normal range of motion. She exhibits no edema.   Lymphadenopathy:     She has no cervical adenopathy.   Neurological: She is alert and oriented to person, place, and time. No cranial nerve deficit.   Skin: Capillary refill takes less than 2 seconds. She is not diaphoretic.   Psychiatric: She has a normal mood and affect. Her behavior is normal. Judgment and thought content normal.   Nursing note and vitals reviewed.      Laboratory Reviewed ({N/A)  Lab Results   Component Value Date    WBC 6.14 08/28/2007    HGB 13.1 08/28/2007    HCT 39.3 08/28/2007     08/28/2007    CHOL 197 04/20/2018    TRIG 114 04/20/2018    HDL 31 04/20/2018    ALT 33 (H) 08/28/2007    AST 22 08/28/2007     08/28/2007    K 4.1 08/28/2007     08/28/2007    CREATININE 0.7 08/28/2007    BUN 14 08/28/2007    CO2 20 (L) 08/28/2007    TSH 1.8 08/28/2007       Allegra was seen today for headache and vaginal bleeding.    Diagnoses and all orders for this visit:    Irregular menses  -     US Pelvis Complete Non OB; Future    Well woman exam with routine gynecological exam  -     Liquid-based pap smear, screening  -     HPV High Risk Genotypes, PCR    Bilateral headaches      Increase topamax to 50 in pm, 25 in am.    No bleeding  seen on Exam.  Check Pelvic US  Pap complete          Care Plan/Goals: Reviewed  (Yes)  Goals    None         Follow up: No follow-ups on file.    After visit summary was printed and given to patient upon discharge today.  Patient goals and care plan are included in After Visit Summary.    Answers for HPI/ROS submitted by the patient on 10/9/2019   activity change: No  unexpected weight change: No  rhinorrhea: No  trouble swallowing: No  visual disturbance: No  chest tightness: No  polyuria: Yes  difficulty urinating: No  menstrual problem: No  joint swelling: No  arthralgias: No  confusion: No  dysphoric mood: Yes  Subjective:      Brook Burdick is a 39 y.o. female who presents for evaluation of headache. Symptoms began about 4 month ago. Generally, the headaches last about several hours and occur at rest. The headaches do not seem to be related to any time of the day. The headaches are usually poorly described and are located in all over head.  The patient rates her most severe headaches a 8 on a scale from 1 to 10. Recently, the headaches have been stable. Work attendance or other daily activities are affected by the headaches. Precipitating factors include: none which have been determined. The headaches are usually not preceded by an aura. Associated neurologic symptoms: NONE. The patient denies decreased physical activity. Home treatment has included Topmamax with little improvement. Other history includes: nothing pertinent. Family history includes no known family members with significant headaches.    Review of Systems  Pertinent items are noted in HPI.      Objective:        /84 (BP Location: Right arm, Patient Position: Sitting, BP Method: Large (Manual))   Pulse 84   Temp 96.3 °F (35.7 °C) (Tympanic)   Ht 6' (1.829 m)   Wt (!) 147.8 kg (325 lb 13.4 oz)   SpO2 97%   BMI 44.19 kg/m²     General Appearance:    Alert, cooperative, no distress, appears stated age   Head:    Normocephalic,  without obvious abnormality, atraumatic   Eyes:    PERRL, conjunctiva/corneas clear, EOM's intact, fundi     benign, both eyes   Ears:    Normal TM's and external ear canals, both ears   Nose:   Nares normal, septum midline, mucosa normal, no drainage    or sinus tenderness   Throat:   Lips, mucosa, and tongue normal; teeth and gums normal   Neck:   Supple, symmetrical, trachea midline, no adenopathy;     thyroid:  no enlargement/tenderness/nodules; no carotid    bruit or JVD   Back:     Symmetric, no curvature, ROM normal, no CVA tenderness   Lungs:     Clear to auscultation bilaterally, respirations unlabored   Chest Wall:    No tenderness or deformity    Heart:    Regular rate and rhythm, S1 and S2 normal, no murmur, rub   or gallop   Breast Exam:    No tenderness, masses, or nipple abnormality   Abdomen:     Soft, non-tender, bowel sounds active all four quadrants,     no masses, no organomegaly   Genitalia:    Normal female without lesion, discharge or tenderness   Rectal:    Normal tone, no masses or tenderness;    guaiac negative stool   Extremities:   Extremities normal, atraumatic, no cyanosis or edema   Pulses:   2+ and symmetric all extremities   Skin:   Skin color, texture, turgor normal, no rashes or lesions   Lymph nodes:   Cervical, supraclavicular, and axillary nodes normal   Neurologic:   CNII-XII intact, normal strength, sensation and reflexes     throughout         Assessment:      Headache      Plan:      Lie in darkened room and apply cold packs as needed for pain.  Side effect profile discussed in detail.  Asked to keep headache diary.  Patient reassured that neurodiagnostic workup not indicated from benign H&P.

## 2019-10-11 RX ORDER — TOPIRAMATE 25 MG/1
TABLET ORAL
Qty: 90 TABLET | Refills: 3 | Status: SHIPPED | OUTPATIENT
Start: 2019-10-11 | End: 2020-02-19

## 2019-10-15 ENCOUNTER — TELEPHONE (OUTPATIENT)
Dept: RADIOLOGY | Facility: HOSPITAL | Age: 39
End: 2019-10-15

## 2019-10-15 LAB
HPV HR 12 DNA CVX QL NAA+PROBE: NEGATIVE
HPV16 AG SPEC QL: NEGATIVE
HPV18 DNA SPEC QL NAA+PROBE: NEGATIVE

## 2019-10-16 ENCOUNTER — OFFICE VISIT (OUTPATIENT)
Dept: INTERNAL MEDICINE | Facility: CLINIC | Age: 39
End: 2019-10-16
Payer: COMMERCIAL

## 2019-10-16 VITALS
SYSTOLIC BLOOD PRESSURE: 126 MMHG | HEIGHT: 72 IN | TEMPERATURE: 97 F | DIASTOLIC BLOOD PRESSURE: 84 MMHG | BODY MASS INDEX: 39.68 KG/M2 | HEART RATE: 72 BPM | WEIGHT: 293 LBS | OXYGEN SATURATION: 98 %

## 2019-10-16 DIAGNOSIS — K52.9 GASTROENTERITIS: Primary | ICD-10-CM

## 2019-10-16 PROCEDURE — 99213 OFFICE O/P EST LOW 20 MIN: CPT | Mod: S$GLB,,, | Performed by: FAMILY MEDICINE

## 2019-10-16 PROCEDURE — 99999 PR PBB SHADOW E&M-EST. PATIENT-LVL III: CPT | Mod: PBBFAC,,, | Performed by: FAMILY MEDICINE

## 2019-10-16 PROCEDURE — 99999 PR PBB SHADOW E&M-EST. PATIENT-LVL III: ICD-10-PCS | Mod: PBBFAC,,, | Performed by: FAMILY MEDICINE

## 2019-10-16 PROCEDURE — 3008F PR BODY MASS INDEX (BMI) DOCUMENTED: ICD-10-PCS | Mod: CPTII,S$GLB,, | Performed by: FAMILY MEDICINE

## 2019-10-16 PROCEDURE — 3008F BODY MASS INDEX DOCD: CPT | Mod: CPTII,S$GLB,, | Performed by: FAMILY MEDICINE

## 2019-10-16 PROCEDURE — 99213 PR OFFICE/OUTPT VISIT, EST, LEVL III, 20-29 MIN: ICD-10-PCS | Mod: S$GLB,,, | Performed by: FAMILY MEDICINE

## 2019-10-16 RX ORDER — ONDANSETRON 8 MG/1
8 TABLET, ORALLY DISINTEGRATING ORAL EVERY 12 HOURS PRN
Qty: 10 TABLET | Refills: 1 | Status: SHIPPED | OUTPATIENT
Start: 2019-10-16 | End: 2020-02-16

## 2019-10-16 NOTE — LETTER
October 16, 2019      [facility]  [id number] [place] [id number]  Phone: [phone]  Fax: [phone]       Patient: Brook Burdick   YOB: 1980  Date of Visit: 10/16/2019    To Whom It May Concern:    Sayra Burdick  was at [facility] on 10/16/2019. She may return to work/school on 10/19/2019 with no restrictions. If you have any questions or concerns, or if I can be of further assistance, please do not hesitate to contact me.    Sincerely,    Adamaris Arguello MD

## 2019-10-16 NOTE — PROGRESS NOTES
Brook Burdick  10/16/2019  [id number]    Adamaris Arguello MD  Patient Care Team:  Adamaris Arguello MD as PCP - General (Family Medicine)  Alice Walker MD as Obstetrician (Obstetrics)  Yue Chan III, MD (Obstetrics and Gynecology)  Has the patient seen any provider outside of the [facility] network since the last visit? (no). If yes, HIPPA forms completed and records requested.        Visit Type:an urgent visit for a new problem    Chief Complaint:  Chief Complaint   Patient presents with    Nausea     started on sunday    Diarrhea    Abdominal Pain    Dehydration       History of Present Illness:    Started with Nausea last Sunday  She reports she had diarrhea, and lower abd pain.  She is worried about dehydration. She is making urine.     History:  Past Medical History:   Diagnosis Date    IBS (irritable bowel syndrome)     Pancreatitis      Past Surgical History:   Procedure Laterality Date    APPENDECTOMY      BILE DUCT EXPLORATION      CHOLECYSTECTOMY       Family History   Problem Relation Age of Onset    Atrial fibrillation Mother     Diabetes Father     Hypertension Father     Cancer Maternal Grandfather      Social History     Socioeconomic History    Marital status: Single     Spouse name: Not on file    Number of children: Not on file    Years of education: Not on file    Highest education level: Not on file   Occupational History    Not on file   Social Needs    Financial resource strain: Not hard at all    Food insecurity:     Worry: Never true     Inability: Never true    Transportation needs:     Medical: No     Non-medical: No   Tobacco Use    Smoking status: Never Smoker    Smokeless tobacco: Never Used   Substance and Sexual Activity    Alcohol use: No     Frequency: Monthly or less     Drinks per session: 1 or 2     Binge frequency: Less than monthly    Drug use: No    Sexual activity: Never   Lifestyle    Physical activity:     Days per week: 5  days     Minutes per session: 60 min    Stress: To some extent   Relationships    Social connections:     Talks on phone: More than three times a week     Gets together: More than three times a week     Attends Latter day service: Not on file     Active member of club or organization: Yes     Attends meetings of clubs or organizations: More than 4 times per year     Relationship status:    Other Topics Concern    Not on file   Social History Narrative    Not on file     Patient Active Problem List   Diagnosis    Chronic constipation    Family history of colonic polyps    Insulin resistance    Gastroesophageal reflux disease without esophagitis    Severe obesity (BMI >= 40)     Review of patient's allergies indicates:   Allergen Reactions    Penicillins Hives       The following were reviewed at this visit: active problem list, medication list, allergies, family history, social history, and health maintenance.    Medications:  Current Outpatient Medications on File Prior to Visit   Medication Sig Dispense Refill    sertraline (ZOLOFT) 50 MG tablet Take 1 tablet (50 mg total) by mouth once daily. 30 tablet 11    topiramate (TOPAMAX) 25 MG tablet Take 2 tabs nightly, one tablet in am 90 tablet 3     No current facility-administered medications on file prior to visit.        Medications have been reviewed and reconciled with patient at this visit.  Barriers to medications present (no)    Adverse reactions to current medications (no)    Over the counter medications reviewed (Yes ), and if needed added to active Medication list at this visit.     Exam:  Wt Readings from Last 3 Encounters:   10/16/19 (!) 145.1 kg (319 lb 14.2 oz)   10/10/19 (!) 147.8 kg (325 lb 13.4 oz)   08/06/19 (!) 150 kg (330 lb 11 oz)     Temp Readings from Last 3 Encounters:   10/16/19 96.5 °F (35.8 °C) (Tympanic)   10/10/19 96.3 °F (35.7 °C) (Tympanic)   07/31/19 96.3 °F (35.7 °C) (Tympanic)     BP Readings from Last 3 Encounters:    10/16/19 126/84   10/10/19 124/84   08/06/19 120/74     Pulse Readings from Last 3 Encounters:   10/16/19 72   10/10/19 84   08/06/19 65     Body mass index is 43.38 kg/m².      Review of Systems   HENT: Negative for hearing loss.    Eyes: Negative for discharge.   Respiratory: Negative for wheezing.    Cardiovascular: Negative for chest pain and palpitations.   Gastrointestinal: Positive for diarrhea. Negative for blood in stool, constipation and vomiting.   Genitourinary: Negative for dysuria and hematuria.   Musculoskeletal: Negative for neck pain.   Neurological: Positive for weakness and headaches.   Endo/Heme/Allergies: Negative for polydipsia.     Physical Exam   Constitutional: She is oriented to person, place, and time. She appears well-developed and well-nourished. No distress.   HENT:   Head: Normocephalic and atraumatic.   Right Ear: External ear normal.   Left Ear: External ear normal.   Nose: Nose normal.   Mouth/Throat: Oropharynx is clear and moist. No oropharyngeal exudate.   Eyes: Pupils are equal, round, and reactive to light. Conjunctivae and EOM are normal. Right eye exhibits no discharge. Left eye exhibits no discharge.   Neck: Normal range of motion. Neck supple. No thyromegaly present.   Cardiovascular: Normal rate, regular rhythm, normal heart sounds and intact distal pulses.   No murmur heard.  Pulmonary/Chest: Effort normal and breath sounds normal. No respiratory distress. She has no wheezes.   Abdominal: Soft. Bowel sounds are normal. She exhibits no distension and no mass. There is no tenderness.   Musculoskeletal: Normal range of motion. She exhibits no edema.   Lymphadenopathy:     She has no cervical adenopathy.   Neurological: She is alert and oriented to person, place, and time. No cranial nerve deficit.   Skin: Capillary refill takes less than 2 seconds. She is not diaphoretic.   Psychiatric: She has a normal mood and affect. Her behavior is normal. Judgment and thought content  normal.   Nursing note and vitals reviewed.      Laboratory Reviewed ({N/A)  Lab Results   Component Value Date    WBC 6.19 10/10/2019    HGB 13.0 10/10/2019    HCT 39.2 10/10/2019     10/10/2019    CHOL 197 04/20/2018    TRIG 114 04/20/2018    HDL 31 04/20/2018    ALT 33 (H) 08/28/2007    AST 22 08/28/2007     08/28/2007    K 4.1 08/28/2007     08/28/2007    CREATININE 0.7 08/28/2007    BUN 14 08/28/2007    CO2 20 (L) 08/28/2007    TSH 1.968 10/10/2019    HGBA1C 5.6 10/10/2019       Allegra was seen today for nausea, diarrhea, abdominal pain and dehydration.    Diagnoses and all orders for this visit:    Gastroenteritis  -     ondansetron (ZOFRAN-ODT) 8 MG TbDL; Take 1 tablet (8 mg total) by mouth every 12 (twelve) hours as needed.    Supportive Care  Hand washing  WOrk excuse              Care Plan/Goals: Reviewed  (N/A)  Goals    None         Follow up: No follow-ups on file.    After visit summary was printed and given to patient upon discharge today.  Patient goals and care plan are included in After Visit Summary.    Answers for HPI/ROS submitted by the patient on 10/16/2019   activity change: No  unexpected weight change: Yes  rhinorrhea: No  trouble swallowing: No  visual disturbance: No  chest tightness: No  polyuria: No  difficulty urinating: No  menstrual problem: No  joint swelling: No  arthralgias: No  confusion: No  dysphoric mood: No

## 2019-10-18 RX ORDER — METRONIDAZOLE 500 MG/1
500 TABLET ORAL EVERY 12 HOURS
Qty: 14 TABLET | Refills: 0 | Status: SHIPPED | OUTPATIENT
Start: 2019-10-18 | End: 2019-10-25

## 2019-10-21 ENCOUNTER — TELEPHONE (OUTPATIENT)
Dept: RADIOLOGY | Facility: HOSPITAL | Age: 39
End: 2019-10-21

## 2019-10-22 ENCOUNTER — HOSPITAL ENCOUNTER (OUTPATIENT)
Dept: RADIOLOGY | Facility: HOSPITAL | Age: 39
Discharge: HOME | End: 2019-10-22
Attending: FAMILY MEDICINE
Payer: COMMERCIAL

## 2019-10-22 ENCOUNTER — PATIENT MESSAGE (OUTPATIENT)
Dept: INTERNAL MEDICINE | Facility: CLINIC | Age: 39
End: 2019-10-22

## 2019-10-22 DIAGNOSIS — N92.6 IRREGULAR MENSES: ICD-10-CM

## 2019-10-22 PROCEDURE — 76856 US EXAM PELVIC COMPLETE: CPT | Mod: 26,,, | Performed by: RADIOLOGY

## 2019-10-22 PROCEDURE — 76830 US PELVIS COMP WITH TRANSVAG NON-OB (XPD): ICD-10-PCS | Mod: 26,,, | Performed by: RADIOLOGY

## 2019-10-22 PROCEDURE — 76830 TRANSVAGINAL US NON-OB: CPT | Mod: TC

## 2019-10-22 PROCEDURE — 76536 US SOFT TISSUE HEAD NECK THYROID: ICD-10-PCS | Mod: 26,,, | Performed by: RADIOLOGY

## 2019-10-22 PROCEDURE — 76830 TRANSVAGINAL US NON-OB: CPT | Mod: 26,,, | Performed by: RADIOLOGY

## 2019-10-22 PROCEDURE — 76536 US EXAM OF HEAD AND NECK: CPT | Mod: TC

## 2019-10-22 PROCEDURE — 76536 US EXAM OF HEAD AND NECK: CPT | Mod: 26,,, | Performed by: RADIOLOGY

## 2019-10-22 PROCEDURE — 76856 US PELVIS COMP WITH TRANSVAG NON-OB (XPD): ICD-10-PCS | Mod: 26,,, | Performed by: RADIOLOGY

## 2019-10-23 ENCOUNTER — TELEPHONE (OUTPATIENT)
Dept: INTERNAL MEDICINE | Facility: CLINIC | Age: 39
End: 2019-10-23

## 2019-10-23 DIAGNOSIS — N83.201 CYST OF RIGHT OVARY: ICD-10-CM

## 2019-10-23 DIAGNOSIS — D21.9 FIBROID: Primary | ICD-10-CM

## 2019-10-23 RX ORDER — FLUCONAZOLE 150 MG/1
150 TABLET ORAL DAILY
Qty: 1 TABLET | Refills: 1 | Status: SHIPPED | OUTPATIENT
Start: 2019-10-23 | End: 2019-10-24

## 2019-10-23 NOTE — TELEPHONE ENCOUNTER
----- Message from Doris Borja sent at 10/23/2019  4:04 PM CDT -----  Contact: patient  Calling concerning having her test results sent over to her OB/GYN. Please call patient @ [phone] when sent.   fax results to [phone] attn: vinicius Ayala pam

## 2019-10-25 ENCOUNTER — OFFICE VISIT (OUTPATIENT)
Dept: OBSTETRICS AND GYNECOLOGY | Facility: CLINIC | Age: 39
End: 2019-10-25
Payer: COMMERCIAL

## 2019-10-25 ENCOUNTER — LAB VISIT (OUTPATIENT)
Dept: LAB | Facility: HOSPITAL | Age: 39
End: 2019-10-25
Attending: OBSTETRICS & GYNECOLOGY
Payer: COMMERCIAL

## 2019-10-25 VITALS — WEIGHT: 293 LBS | SYSTOLIC BLOOD PRESSURE: 112 MMHG | DIASTOLIC BLOOD PRESSURE: 86 MMHG | BODY MASS INDEX: 44.19 KG/M2

## 2019-10-25 DIAGNOSIS — N83.209 CYST OF OVARY, UNSPECIFIED LATERALITY: ICD-10-CM

## 2019-10-25 DIAGNOSIS — N92.6 IRREGULAR MENSES: Primary | ICD-10-CM

## 2019-10-25 DIAGNOSIS — N92.6 IRREGULAR MENSES: ICD-10-CM

## 2019-10-25 LAB
FSH SERPL-ACNC: 6.9 MIU/ML
PROLACTIN SERPL IA-MCNC: 11.1 NG/ML (ref 5.2–26.5)

## 2019-10-25 PROCEDURE — 36415 COLL VENOUS BLD VENIPUNCTURE: CPT

## 2019-10-25 PROCEDURE — 81025 URINE PREGNANCY TEST: CPT | Mod: S$GLB,,, | Performed by: OBSTETRICS & GYNECOLOGY

## 2019-10-25 PROCEDURE — 99204 OFFICE O/P NEW MOD 45 MIN: CPT | Mod: S$GLB,,, | Performed by: OBSTETRICS & GYNECOLOGY

## 2019-10-25 PROCEDURE — 99999 PR PBB SHADOW E&M-EST. PATIENT-LVL III: CPT | Mod: PBBFAC,,, | Performed by: OBSTETRICS & GYNECOLOGY

## 2019-10-25 PROCEDURE — 83001 ASSAY OF GONADOTROPIN (FSH): CPT

## 2019-10-25 PROCEDURE — 99999 PR PBB SHADOW E&M-EST. PATIENT-LVL III: ICD-10-PCS | Mod: PBBFAC,,, | Performed by: OBSTETRICS & GYNECOLOGY

## 2019-10-25 PROCEDURE — 3008F PR BODY MASS INDEX (BMI) DOCUMENTED: ICD-10-PCS | Mod: CPTII,S$GLB,, | Performed by: OBSTETRICS & GYNECOLOGY

## 2019-10-25 PROCEDURE — 84146 ASSAY OF PROLACTIN: CPT

## 2019-10-25 PROCEDURE — 3008F BODY MASS INDEX DOCD: CPT | Mod: CPTII,S$GLB,, | Performed by: OBSTETRICS & GYNECOLOGY

## 2019-10-25 PROCEDURE — 81025 PR  URINE PREGNANCY TEST: ICD-10-PCS | Mod: S$GLB,,, | Performed by: OBSTETRICS & GYNECOLOGY

## 2019-10-25 PROCEDURE — 99204 PR OFFICE/OUTPT VISIT, NEW, LEVL IV, 45-59 MIN: ICD-10-PCS | Mod: S$GLB,,, | Performed by: OBSTETRICS & GYNECOLOGY

## 2019-10-25 RX ORDER — NORETHINDRONE ACETATE AND ETHINYL ESTRADIOL .03; 1.5 MG/1; MG/1
1 TABLET ORAL DAILY
Qty: 28 EACH | Refills: 11 | Status: SHIPPED | OUTPATIENT
Start: 2019-10-25 | End: 2020-02-16

## 2019-10-25 RX ORDER — MEDROXYPROGESTERONE ACETATE 10 MG/1
10 TABLET ORAL DAILY
Qty: 10 TABLET | Refills: 0 | Status: SHIPPED | OUTPATIENT
Start: 2019-10-25 | End: 2020-02-16

## 2019-10-25 NOTE — LETTER
October 25, 2019      Adamaris Arguello MD  [address] [facility] [address]           [facility]  [address] [address] [facility] [place] [address]  Phone: [phone]  Fax: [phone]          Patient: Brook Burdick   MR Number: [id number]   YOB: 1980   Date of Visit: 10/25/2019       Dear Dr. Adamaris Arguello:    Thank you for referring Brook Burdick to me for evaluation. Attached you will find relevant portions of my assessment and plan of care.    If you have questions, please do not hesitate to call me. I look forward to following Brook Burdick along with you.    Sincerely,    Lionel Barclay MD    Enclosure  CC:  No Recipients    If you would like to receive this communication electronically, please contact [email or web address] or [phone] to request more information on [facility] access.    For providers and/or their staff who would like to refer a patient to [facility], please contact us through our one-stop-shop provider referral line, [facility], at [phone].    If you feel you have received this communication in error or would no longer like to receive these types of communications, please e-mail [email or web address]

## 2019-10-25 NOTE — PROGRESS NOTES
Subjective:       Patient ID: Brook Burdick is a 39 y.o. female.    Chief Complaint:  Fibroids and Ovarian Cyst      History of Present Illness  HPI  Pt was referred by PCP for evaluation of irregular menses, abdominal pains, fibroid, and ovarian cyst. Pt reports a long history of irregular menses since menarche.  Regularly skips multiple months in a row.  When she has menses, they are heavier and sometimes could last for weeks.  Pt has been experiencing strong lower abdominal cramping/sharp pains for the past several weeks. Pt has been evaluated for this before, most recently with Dr. Chan 2-3 years ago.  Pt was given Provera to start her menses and OCP to regulate it.  Pt took the medications for a while but stopped due to side-effects.  Pt has not been on any hormonal treatments for at least 3 years now.  Pt is not currently sexually active and would like to keep options open for pregnancy in the future.  Last pap NILM with negative HPV in 2019.      GYN & OB History  No LMP recorded.   Date of Last Pap: 10/18/2019    OB History    Para Term  AB Living   0 0 0 0 0 0   SAB TAB Ectopic Multiple Live Births   0 0 0 0 0       Review of Systems  Review of Systems   Constitutional: Negative for activity change, appetite change, chills, fatigue, fever and unexpected weight change.   Respiratory: Negative for shortness of breath.    Cardiovascular: Negative for chest pain, palpitations and leg swelling.   Gastrointestinal: Positive for abdominal pain, constipation (IBS) and diarrhea (IBS). Negative for bloating, blood in stool, nausea and vomiting.   Genitourinary: Positive for menstrual problem and pelvic pain. Negative for dysmenorrhea, dyspareunia, dysuria, flank pain, frequency, genital sores, hematuria, menorrhagia, urgency, vaginal bleeding, vaginal discharge, vaginal pain, urinary incontinence, postcoital bleeding, vaginal dryness and vaginal odor.   Musculoskeletal: Positive for back pain.    Integumentary:  Negative for breast mass, nipple discharge, breast skin changes and breast tenderness.   Neurological: Positive for headaches. Negative for syncope.   Breast: Negative for asymmetry, lump, mass, mastodynia, nipple discharge, skin changes and tenderness          Objective:    Physical Exam:   Constitutional: She is oriented to person, place, and time. She appears well-developed and well-nourished. No distress.    HENT:   Head: Normocephalic and atraumatic.    Eyes: Pupils are equal, round, and reactive to light. EOM are normal.    Neck: Normal range of motion.    Cardiovascular: Normal rate, regular rhythm and normal heart sounds.     Pulmonary/Chest: Effort normal and breath sounds normal.        Abdominal: Soft. Bowel sounds are normal. She exhibits no distension. There is no tenderness.     Genitourinary: Vagina normal and uterus normal. Pelvic exam was performed with patient supine. There is no rash, tenderness, lesion or injury on the right labia. There is no rash, tenderness, lesion or injury on the left labia. Uterus is not deviated, not enlarged and not tender. Cervix is normal. Right adnexum displays no mass, no tenderness and no fullness. Left adnexum displays no mass, no tenderness and no fullness. No erythema, tenderness or bleeding in the vagina. No foreign body in the vagina. No signs of injury around the vagina. No vaginal discharge found. Cervix exhibits no motion tenderness, no discharge and no friability.           Musculoskeletal: Normal range of motion and moves all extremeties. She exhibits no edema or tenderness.       Neurological: She is alert and oriented to person, place, and time.    Skin: Skin is warm and dry.    Psychiatric: She has a normal mood and affect. Her behavior is normal. Thought content normal.          US 10/22/19:  Uterus: Size: 9.4 x 4.6 by 5.9 cm Masses: There is a 2.6 x 2.3 x 2.0 cm fibroid located within the mid body of the uterus.  This appears  intramural in location. Endometrium: Normal in this pre menopausal patient, measuring 5.8 mm  Right ovary: Size: 3.0 x 2.8 x 2.7 cm Appearance: There is a complex right ovarian cyst seen measuring 3.2 x 2.5 x 2.2.  This has the appearance of a hemorrhagic cyst. Vascular flow: Normal.  Left ovary: Size: 2.3 x 2.1 x 2.2 cm Appearance: Normal Vascular Flow: Normal.  Free Fluid: None.     Assessment:        1. Irregular menses    2. Cyst of ovary, unspecified laterality             Plan:      Irregular menses  -     POCT urine pregnancy  -     medroxyPROGESTERone (PROVERA) 10 MG tablet; Take 1 tablet (10 mg total) by mouth once daily.  Dispense: 10 tablet; Refill: 0  -     Follicle stimulating hormone; Future; Expected date: 10/25/2019  -     Prolactin; Future; Expected date: 10/25/2019  -     norethindrone ac-eth estradiol (JUNEL 1.5/30, 21,) 1.5-30 mg-mcg Tab; Take 1 tablet by mouth once daily.  Dispense: 28 each; Refill: 11  -     Overall picture is consistent with chronic anovulation/PCOS.  Although a small fibroid was noted on ultrasound, it is unlikely that this finding is associated with her presenting symptoms.  Pt was counseled on chronic anovulation/PCOS, including common signs/symptoms and the many factors that influence it.  Pt was also counseled on available treatment options, including the associated risks and benefits of each.  Pt voiced understanding and desires to proceed with Provera followed by OCP.  Medication dosing, side-effects, risks, benefits, and alternatives were discussed.  Medical history was reviewed and pt is a candidate for OCP use.  -     Pt was counseled on the link between obesity and PCOS.  Recommend a healthy diet and exercise regimen with goal 20% weight loss (64 lb).    Cyst of ovary, unspecified laterality  -     Pt was counseled on ovarian cysts.  Including that they are a common and often times asymptomatic finding that normally resolve spontaneously and without intervention.   Common symptoms associated with cysts and treatment/prevention options were discussed.  Current cyst noted on recent ultrasound likely represents a hemorrhagic cyst and is likely to resolve spontaneously.  If symptoms consistent with ovarian cyst related pain were to occur in the future, would advise repeat ultrasound in 4-6 weeks.  If pt remains asymptomatic, would advise no further interventions.  Treatment options to prevent future cyst were covered, including recommendation to initiate hormonal contraception (see counseling summary above).      Follow up in about 3 months (around 1/25/2020).

## 2019-12-09 ENCOUNTER — HOSPITAL ENCOUNTER (EMERGENCY)
Facility: HOSPITAL | Age: 39
Discharge: HOME | End: 2019-12-09
Attending: EMERGENCY MEDICINE
Payer: COMMERCIAL

## 2019-12-09 VITALS
TEMPERATURE: 98 F | WEIGHT: 293 LBS | BODY MASS INDEX: 39.68 KG/M2 | RESPIRATION RATE: 16 BRPM | HEART RATE: 67 BPM | DIASTOLIC BLOOD PRESSURE: 71 MMHG | HEIGHT: 72 IN | SYSTOLIC BLOOD PRESSURE: 112 MMHG | OXYGEN SATURATION: 98 %

## 2019-12-09 DIAGNOSIS — R19.7 NAUSEA VOMITING AND DIARRHEA: Primary | ICD-10-CM

## 2019-12-09 DIAGNOSIS — R10.84 GENERALIZED ABDOMINAL PAIN: ICD-10-CM

## 2019-12-09 DIAGNOSIS — K59.00 CONSTIPATION, UNSPECIFIED CONSTIPATION TYPE: ICD-10-CM

## 2019-12-09 DIAGNOSIS — R11.2 NAUSEA VOMITING AND DIARRHEA: Primary | ICD-10-CM

## 2019-12-09 LAB
ALBUMIN SERPL BCP-MCNC: 4 G/DL (ref 3.5–5.2)
ALP SERPL-CCNC: 72 U/L (ref 55–135)
ALT SERPL W/O P-5'-P-CCNC: 50 U/L (ref 10–44)
ANION GAP SERPL CALC-SCNC: 11 MMOL/L (ref 8–16)
AST SERPL-CCNC: 31 U/L (ref 10–40)
B-HCG UR QL: NEGATIVE
BASOPHILS # BLD AUTO: 0.02 K/UL (ref 0–0.2)
BASOPHILS NFR BLD: 0.2 % (ref 0–1.9)
BILIRUB SERPL-MCNC: 1 MG/DL (ref 0.1–1)
BILIRUB UR QL STRIP: NEGATIVE
BUN SERPL-MCNC: 13 MG/DL (ref 6–20)
CALCIUM SERPL-MCNC: 9 MG/DL (ref 8.7–10.5)
CHLORIDE SERPL-SCNC: 108 MMOL/L (ref 95–110)
CLARITY UR: CLEAR
CO2 SERPL-SCNC: 22 MMOL/L (ref 23–29)
COLOR UR: YELLOW
CREAT SERPL-MCNC: 0.7 MG/DL (ref 0.5–1.4)
DIFFERENTIAL METHOD: ABNORMAL
EOSINOPHIL # BLD AUTO: 0 K/UL (ref 0–0.5)
EOSINOPHIL NFR BLD: 0.4 % (ref 0–8)
ERYTHROCYTE [DISTWIDTH] IN BLOOD BY AUTOMATED COUNT: 12.9 % (ref 11.5–14.5)
EST. GFR  (AFRICAN AMERICAN): >60 ML/MIN/1.73 M^2
EST. GFR  (NON AFRICAN AMERICAN): >60 ML/MIN/1.73 M^2
GLUCOSE SERPL-MCNC: 129 MG/DL (ref 70–110)
GLUCOSE UR QL STRIP: NEGATIVE
HCT VFR BLD AUTO: 42.9 % (ref 37–48.5)
HGB BLD-MCNC: 13.8 G/DL (ref 12–16)
HGB UR QL STRIP: NEGATIVE
IMM GRANULOCYTES # BLD AUTO: 0.01 K/UL (ref 0–0.04)
IMM GRANULOCYTES NFR BLD AUTO: 0.1 % (ref 0–0.5)
KETONES UR QL STRIP: NEGATIVE
LEUKOCYTE ESTERASE UR QL STRIP: NEGATIVE
LYMPHOCYTES # BLD AUTO: 0.7 K/UL (ref 1–4.8)
LYMPHOCYTES NFR BLD: 7.5 % (ref 18–48)
MCH RBC QN AUTO: 30 PG (ref 27–31)
MCHC RBC AUTO-ENTMCNC: 32.2 G/DL (ref 32–36)
MCV RBC AUTO: 93 FL (ref 82–98)
MONOCYTES # BLD AUTO: 0.5 K/UL (ref 0.3–1)
MONOCYTES NFR BLD: 5.4 % (ref 4–15)
NEUTROPHILS # BLD AUTO: 7.7 K/UL (ref 1.8–7.7)
NEUTROPHILS NFR BLD: 86.4 % (ref 38–73)
NITRITE UR QL STRIP: NEGATIVE
NRBC BLD-RTO: 0 /100 WBC
PH UR STRIP: 6 [PH] (ref 5–8)
PLATELET # BLD AUTO: 352 K/UL (ref 150–350)
PMV BLD AUTO: 9.8 FL (ref 9.2–12.9)
POTASSIUM SERPL-SCNC: 3.8 MMOL/L (ref 3.5–5.1)
PROT SERPL-MCNC: 7.7 G/DL (ref 6–8.4)
PROT UR QL STRIP: NEGATIVE
RBC # BLD AUTO: 4.6 M/UL (ref 4–5.4)
SODIUM SERPL-SCNC: 141 MMOL/L (ref 136–145)
SP GR UR STRIP: >=1.03 (ref 1–1.03)
URN SPEC COLLECT METH UR: ABNORMAL
UROBILINOGEN UR STRIP-ACNC: NEGATIVE EU/DL
WBC # BLD AUTO: 8.93 K/UL (ref 3.9–12.7)

## 2019-12-09 PROCEDURE — 81003 URINALYSIS AUTO W/O SCOPE: CPT

## 2019-12-09 PROCEDURE — 96365 THER/PROPH/DIAG IV INF INIT: CPT

## 2019-12-09 PROCEDURE — 80053 COMPREHEN METABOLIC PANEL: CPT

## 2019-12-09 PROCEDURE — 81025 URINE PREGNANCY TEST: CPT

## 2019-12-09 PROCEDURE — 96375 TX/PRO/DX INJ NEW DRUG ADDON: CPT

## 2019-12-09 PROCEDURE — 96361 HYDRATE IV INFUSION ADD-ON: CPT

## 2019-12-09 PROCEDURE — 63600175 PHARM REV CODE 636 W HCPCS: Performed by: REGISTERED NURSE

## 2019-12-09 PROCEDURE — 99284 EMERGENCY DEPT VISIT MOD MDM: CPT | Mod: 25

## 2019-12-09 PROCEDURE — 85025 COMPLETE CBC W/AUTO DIFF WBC: CPT

## 2019-12-09 RX ORDER — PROMETHAZINE HYDROCHLORIDE 25 MG/1
25 TABLET ORAL EVERY 6 HOURS PRN
Qty: 15 TABLET | Refills: 0 | Status: SHIPPED | OUTPATIENT
Start: 2019-12-09 | End: 2020-02-16

## 2019-12-09 RX ORDER — KETOROLAC TROMETHAMINE 30 MG/ML
30 INJECTION, SOLUTION INTRAMUSCULAR; INTRAVENOUS
Status: COMPLETED | OUTPATIENT
Start: 2019-12-09 | End: 2019-12-09

## 2019-12-09 RX ORDER — DOCUSATE SODIUM 100 MG/1
100 CAPSULE, LIQUID FILLED ORAL 2 TIMES DAILY
Qty: 60 CAPSULE | Refills: 0 | Status: SHIPPED | OUTPATIENT
Start: 2019-12-09 | End: 2020-02-16

## 2019-12-09 RX ORDER — DICLOFENAC SODIUM 75 MG/1
75 TABLET, DELAYED RELEASE ORAL 2 TIMES DAILY
Qty: 20 TABLET | Refills: 0 | Status: SHIPPED | OUTPATIENT
Start: 2019-12-09 | End: 2020-02-16

## 2019-12-09 RX ORDER — ONDANSETRON 4 MG/1
4 TABLET, FILM COATED ORAL EVERY 6 HOURS
Qty: 12 TABLET | Refills: 0 | Status: SHIPPED | OUTPATIENT
Start: 2019-12-09 | End: 2020-02-16

## 2019-12-09 RX ADMIN — SODIUM CHLORIDE 1000 ML: 0.9 INJECTION, SOLUTION INTRAVENOUS at 05:12

## 2019-12-09 RX ADMIN — KETOROLAC TROMETHAMINE 30 MG: 30 INJECTION, SOLUTION INTRAMUSCULAR; INTRAVENOUS at 04:12

## 2019-12-09 RX ADMIN — PROMETHAZINE HYDROCHLORIDE 12.5 MG: 25 INJECTION INTRAMUSCULAR; INTRAVENOUS at 04:12

## 2019-12-09 NOTE — ED PROVIDER NOTES
SCRIBE #1 NOTE: I, Pebbles Anderson, am scribing for, and in the presence of, Oleg Angel Jr., [facility]. I have scribed the entire note.       History     Chief Complaint   Patient presents with    Abdominal Pain     started last night, generalized abd pain with nausea and vomiting and diarrhea, no fever.     Review of patient's allergies indicates:   Allergen Reactions    Penicillins Hives         History of Present Illness     HPI    12/9/2019, 4:32 PM  History obtained from the patient      History of Present Illness: Brook Burdick is a 39 y.o. female patient with a PMHx of IBS, pancreatitis, and PSHx of appendectomy, cholecystectomy, bile duct exploration, who presents to the Emergency Department for evaluation of generalized abd pain which onset gradually last night. Symptoms are constant and moderate in severity. Pt reports drinking fluids worsens sxs. Associated sxs include n/v/d. Patient denies any fever/chills, fatigue, congestion, sore throat, SOB, cough, CP, palpitations, dysuria, hematuria, neck pain, rectal pain, back pain, rash, dizziness, HA, bruises/blds easily, and all other sxs at this time. Prior Tx includes Zofran tablets and Benzyl with no improvement in sxs. No further complaints or concerns at this time.     Arrival mode: Personal vehicle    PCP: Adamaris Arguello MD     Past Medical History:  Past Medical History:   Diagnosis Date    IBS (irritable bowel syndrome)     Obesity     Pancreatitis        Past Surgical History:  Past Surgical History:   Procedure Laterality Date    APPENDECTOMY      BILE DUCT EXPLORATION      CHOLECYSTECTOMY           Family History:  Family History   Problem Relation Age of Onset    Atrial fibrillation Mother     Diabetes Father     Hypertension Father     Cancer Maternal Grandfather        Social History:  Social History     Tobacco Use    Smoking status: Never Smoker    Smokeless tobacco: Never Used   Substance and Sexual Activity    Alcohol  use: No     Frequency: Monthly or less     Drinks per session: 1 or 2     Binge frequency: Less than monthly    Drug use: No    Sexual activity: Never        Review of Systems     Review of Systems   Constitutional: Negative for chills, fatigue and fever.   HENT: Negative for congestion and sore throat.    Respiratory: Negative for cough and shortness of breath.    Cardiovascular: Negative for chest pain and palpitations.   Gastrointestinal: Positive for abdominal pain (generalized), diarrhea, nausea and vomiting. Negative for rectal pain.   Genitourinary: Negative for dysuria and hematuria.   Musculoskeletal: Negative for back pain.   Skin: Negative for rash.   Neurological: Negative for dizziness and headaches.   Hematological: Does not bruise/bleed easily.   All other systems reviewed and are negative.     Physical Exam     Initial Vitals [12/09/19 1626]   BP Pulse Resp Temp SpO2   136/71 79 16 98.2 °F (36.8 °C) 97 %      MAP       --          Physical Exam  Nursing Notes and Vital Signs Reviewed.  Constitutional: Patient is in no acute distress. Ill-appearing.  Head: Atraumatic. Normocephalic.  Eyes: PERRL. EOM intact. Conjunctivae are not pale. No scleral icterus.  ENT: Mucous membranes are moist. Oropharynx is clear and symmetric.    Neck: Supple. Full ROM. No lymphadenopathy.  Cardiovascular: Regular rate. Regular rhythm. No murmurs, rubs, or gallops. Distal pulses are 2+ and symmetric.  Pulmonary/Chest: No respiratory distress. Clear to auscultation bilaterally. No wheezing or rales.  Abdominal: Soft and non-distended. Diffusive abd tenderness. No rebound, guarding, or rigidity. Good bowel sounds.  Genitourinary: No CVA tenderness  Musculoskeletal: Moves all extremities. No obvious deformities. No edema. No calf tenderness.  Skin: Warm and dry.  Neurological:  Alert, awake, and appropriate.  Normal speech.  No acute focal neurological deficits are appreciated.  Psychiatric: Normal affect. Good eye contact.  Appropriate in content.     ED Course   Procedures  ED Vital Signs:  Vitals:    12/09/19 1626 12/09/19 1957   BP: 136/71 112/71   Pulse: 79 67   Resp: 16    Temp: 98.2 °F (36.8 °C)    TempSrc: Oral    SpO2: 97% 98%   Weight: (!) 146.8 kg (323 lb 10.2 oz)    Height: 6' (1.829 m)        Abnormal Lab Results:  Labs Reviewed   CBC W/ AUTO DIFFERENTIAL - Abnormal; Notable for the following components:       Result Value    Platelets 352 (*)     Lymph # 0.7 (*)     Gran% 86.4 (*)     Lymph% 7.5 (*)     All other components within normal limits   COMPREHENSIVE METABOLIC PANEL - Abnormal; Notable for the following components:    CO2 22 (*)     Glucose 129 (*)     ALT 50 (*)     All other components within normal limits   URINALYSIS, REFLEX TO URINE CULTURE - Abnormal; Notable for the following components:    Specific Gravity, UA >=1.030 (*)     All other components within normal limits    Narrative:     Preferred Collection Type->Urine, Clean Catch   PREGNANCY TEST, URINE RAPID        All Lab Results:  Results for orders placed or performed during the hospital encounter of 12/09/19   CBC auto differential   Result Value Ref Range    WBC 8.93 3.90 - 12.70 K/uL    RBC 4.60 4.00 - 5.40 M/uL    Hemoglobin 13.8 12.0 - 16.0 g/dL    Hematocrit 42.9 37.0 - 48.5 %    Mean Corpuscular Volume 93 82 - 98 fL    Mean Corpuscular Hemoglobin 30.0 27.0 - 31.0 pg    Mean Corpuscular Hemoglobin Conc 32.2 32.0 - 36.0 g/dL    RDW 12.9 11.5 - 14.5 %    Platelets 352 (H) 150 - 350 K/uL    MPV 9.8 9.2 - 12.9 fL    Immature Granulocytes 0.1 0.0 - 0.5 %    Gran # (ANC) 7.7 1.8 - 7.7 K/uL    Immature Grans (Abs) 0.01 0.00 - 0.04 K/uL    Lymph # 0.7 (L) 1.0 - 4.8 K/uL    Mono # 0.5 0.3 - 1.0 K/uL    Eos # 0.0 0.0 - 0.5 K/uL    Baso # 0.02 0.00 - 0.20 K/uL    nRBC 0 0 /100 WBC    Gran% 86.4 (H) 38.0 - 73.0 %    Lymph% 7.5 (L) 18.0 - 48.0 %    Mono% 5.4 4.0 - 15.0 %    Eosinophil% 0.4 0.0 - 8.0 %    Basophil% 0.2 0.0 - 1.9 %    Differential Method  Automated    Comprehensive metabolic panel   Result Value Ref Range    Sodium 141 136 - 145 mmol/L    Potassium 3.8 3.5 - 5.1 mmol/L    Chloride 108 95 - 110 mmol/L    CO2 22 (L) 23 - 29 mmol/L    Glucose 129 (H) 70 - 110 mg/dL    BUN, Bld 13 6 - 20 mg/dL    Creatinine 0.7 0.5 - 1.4 mg/dL    Calcium 9.0 8.7 - 10.5 mg/dL    Total Protein 7.7 6.0 - 8.4 g/dL    Albumin 4.0 3.5 - 5.2 g/dL    Total Bilirubin 1.0 0.1 - 1.0 mg/dL    Alkaline Phosphatase 72 55 - 135 U/L    AST 31 10 - 40 U/L    ALT 50 (H) 10 - 44 U/L    Anion Gap 11 8 - 16 mmol/L    eGFR if African American >60 >60 mL/min/1.73 m^2    eGFR if non African American >60 >60 mL/min/1.73 m^2   Urinalysis, Reflex to Urine Culture Urine, Clean Catch   Result Value Ref Range    Specimen UA Urine, Clean Catch     Color, UA Yellow Yellow, Straw, Melissa    Appearance, UA Clear Clear    pH, UA 6.0 5.0 - 8.0    Specific Gravity, UA >=1.030 (A) 1.005 - 1.030    Protein, UA Negative Negative    Glucose, UA Negative Negative    Ketones, UA Negative Negative    Bilirubin (UA) Negative Negative    Occult Blood UA Negative Negative    Nitrite, UA Negative Negative    Urobilinogen, UA Negative <2.0 EU/dL    Leukocytes, UA Negative Negative   Pregnancy, urine rapid (UPT)   Result Value Ref Range    Preg Test, Ur Negative        Imaging Results:  Imaging Results          X-Ray Abdomen Flat And Erect (Final result)  Result time 12/09/19 19:36:36    Final result by Maycol Suresh MD (12/09/19 19:36:36)                 Impression:      1. Postoperative changes of the abdomen.  Nonobstructive bowel gas pattern.  2. Moderate stool.      Electronically signed by: Maycol Suresh  Date:    12/09/2019  Time:    19:36             Narrative:    EXAMINATION:  XR ABDOMEN FLAT AND ERECT    CLINICAL HISTORY:  Generalized abdominal pain    COMPARISON:  None    FINDINGS:  Supine and upright views of the abdomen demonstrate no abnormal bowel dilatation.  No significant air-fluid levels.   Normal amount of stool.Postoperative changes identified with surgical clips right upper quadrant surgical sutures identified right abdomen.  Moderate stool.                                      The Emergency Provider reviewed the vital signs and test results, which are outlined above.     ED Discussion     7:51 PM: Reassessed pt at this time.  Pt states her condition has improved at this time. Discussed with pt all pertinent ED information and results. Discussed pt dx of  and plan of tx. Gave pt all f/u and return to the ED instructions. All questions and concerns were addressed at this time. Pt expresses understanding of information and instructions, and is comfortable with plan to discharge. Pt is stable for discharge.    I discussed with patient and/or family/caretaker that evaluation in the ED does not suggest any emergent or life threatening medical conditions requiring immediate intervention beyond what was provided in the ED, and I believe patient is safe for discharge.  Regardless, an unremarkable evaluation in the ED does not preclude the development or presence of a serious of life threatening condition. As such, patient was instructed to return immediately for any worsening or change in current symptoms.       Medical Decision Making:   Clinical Tests:   Lab Tests: Ordered and Reviewed  Radiological Study: Ordered and Reviewed           ED Medication(s):  Medications   sodium chloride 0.9% bolus 1,000 mL (0 mLs Intravenous Stopped 12/9/19 1952)   promethazine (PHENERGAN) 12.5 mg in dextrose 5 % 50 mL IVPB (0 mg Intravenous Stopped 12/9/19 1719)   ketorolac injection 30 mg (30 mg Intravenous Given 12/9/19 1657)       Discharge Medication List as of 12/9/2019  7:50 PM      START taking these medications    Details   diclofenac (VOLTAREN) 75 MG EC tablet Take 1 tablet (75 mg total) by mouth 2 (two) times daily., Starting Mon 12/9/2019, Print      docusate sodium (COLACE) 100 MG capsule Take 1 capsule (100  mg total) by mouth 2 (two) times daily., Starting Mon 12/9/2019, Print      ondansetron (ZOFRAN) 4 MG tablet Take 1 tablet (4 mg total) by mouth every 6 (six) hours., Starting Mon 12/9/2019, Print      promethazine (PHENERGAN) 25 MG tablet Take 1 tablet (25 mg total) by mouth every 6 (six) hours as needed for Nausea., Starting Mon 12/9/2019, Print             Follow-up Information     Adamaris Arguello MD In 1 week.    Specialty:  Family Medicine  Contact information:  [address] [facility] [address] [phone]                       Scribe Attestation:   Scribe #1: I performed the above scribed service and the documentation accurately describes the services I performed. I attest to the accuracy of the note.     Attending:   Physician Attestation Statement for Scribe #1: I, LIONEL Casanova Jr. , personally performed the services described in this documentation, as scribed by Pebbles Anderson, in my presence, and it is both accurate and complete.           Clinical Impression       ICD-10-CM ICD-9-CM   1. Nausea vomiting and diarrhea R11.2 787.91    R19.7 787.01   2. Generalized abdominal pain R10.84 789.07   3. Constipation, unspecified constipation type K59.00 564.00       Disposition:   Disposition: Discharged  Condition: Stable       LIONEL Casanova Jr.  12/09/19 8807

## 2019-12-10 NOTE — ED NOTES
Pt ambulated to restroom with specimen cup but states she is unable to provide urine specimen at this time.

## 2020-02-18 PROBLEM — R51.9 CHRONIC HEADACHES: Status: ACTIVE | Noted: 2020-02-18

## 2020-02-18 PROBLEM — G89.29 CHRONIC HEADACHES: Status: ACTIVE | Noted: 2020-02-18

## 2020-02-19 ENCOUNTER — OFFICE VISIT (OUTPATIENT)
Dept: INTERNAL MEDICINE | Facility: CLINIC | Age: 40
End: 2020-02-19
Payer: COMMERCIAL

## 2020-02-19 VITALS
HEART RATE: 86 BPM | BODY MASS INDEX: 44.33 KG/M2 | SYSTOLIC BLOOD PRESSURE: 132 MMHG | TEMPERATURE: 98 F | DIASTOLIC BLOOD PRESSURE: 84 MMHG | OXYGEN SATURATION: 95 % | WEIGHT: 293 LBS

## 2020-02-19 DIAGNOSIS — R51.9 CHRONIC NONINTRACTABLE HEADACHE, UNSPECIFIED HEADACHE TYPE: ICD-10-CM

## 2020-02-19 DIAGNOSIS — G89.29 CHRONIC BILATERAL LOW BACK PAIN, UNSPECIFIED WHETHER SCIATICA PRESENT: Primary | ICD-10-CM

## 2020-02-19 DIAGNOSIS — G89.29 CHRONIC NONINTRACTABLE HEADACHE, UNSPECIFIED HEADACHE TYPE: ICD-10-CM

## 2020-02-19 DIAGNOSIS — G89.29 CHRONIC LEFT-SIDED LOW BACK PAIN WITH LEFT-SIDED SCIATICA: ICD-10-CM

## 2020-02-19 DIAGNOSIS — M54.42 CHRONIC LEFT-SIDED LOW BACK PAIN WITH LEFT-SIDED SCIATICA: ICD-10-CM

## 2020-02-19 DIAGNOSIS — M54.50 CHRONIC BILATERAL LOW BACK PAIN, UNSPECIFIED WHETHER SCIATICA PRESENT: Primary | ICD-10-CM

## 2020-02-19 PROCEDURE — 3008F BODY MASS INDEX DOCD: CPT | Mod: CPTII,S$GLB,, | Performed by: FAMILY MEDICINE

## 2020-02-19 PROCEDURE — 3008F PR BODY MASS INDEX (BMI) DOCUMENTED: ICD-10-PCS | Mod: CPTII,S$GLB,, | Performed by: FAMILY MEDICINE

## 2020-02-19 PROCEDURE — 99999 PR PBB SHADOW E&M-EST. PATIENT-LVL III: CPT | Mod: PBBFAC,,, | Performed by: FAMILY MEDICINE

## 2020-02-19 PROCEDURE — 99999 PR PBB SHADOW E&M-EST. PATIENT-LVL III: ICD-10-PCS | Mod: PBBFAC,,, | Performed by: FAMILY MEDICINE

## 2020-02-19 PROCEDURE — 99214 OFFICE O/P EST MOD 30 MIN: CPT | Mod: S$GLB,,, | Performed by: FAMILY MEDICINE

## 2020-02-19 PROCEDURE — 99214 PR OFFICE/OUTPT VISIT, EST, LEVL IV, 30-39 MIN: ICD-10-PCS | Mod: S$GLB,,, | Performed by: FAMILY MEDICINE

## 2020-02-19 RX ORDER — GABAPENTIN 100 MG/1
100 CAPSULE ORAL NIGHTLY
Qty: 30 CAPSULE | Refills: 3 | Status: SHIPPED | OUTPATIENT
Start: 2020-02-19 | End: 2020-04-23

## 2020-02-19 NOTE — PROGRESS NOTES
Brook Burdick  02/19/2020  [id number]    Adamaris Arguello MD  Patient Care Team:  Adamaris Arguello MD as PCP - General (Family Medicine)  Alice Walker MD as Obstetrician (Obstetrics)  OeRnetta Chan III, MD (Obstetrics and Gynecology)  Has the patient seen any provider outside of the [facility] network since the last visit? (no). If yes, HIPPA forms completed and records requested.        Visit Type:a scheduled routine follow-up visit    Chief Complaint:  Chief Complaint   Patient presents with    Headache    Pain     back and hip pain of 6        History of Present Illness:  39 year old here for follow up.  She continues to c/o HA  She is on OCP  She is also on Topamax for prophylaxis- couldn't not    She is c/o back and Hip pain.  She has had this chronically  We had referred to PT in past.  Offered NSAID in past.  She was referred to Pain management.  Did not return back for follow up.            History:  Past Medical History:   Diagnosis Date    IBS (irritable bowel syndrome)     Obesity     Pancreatitis      Past Surgical History:   Procedure Laterality Date    APPENDECTOMY      BILE DUCT EXPLORATION      CHOLECYSTECTOMY       Family History   Problem Relation Age of Onset    Atrial fibrillation Mother     Diabetes Father     Hypertension Father     Cancer Maternal Grandfather      Social History     Socioeconomic History    Marital status: Single     Spouse name: Not on file    Number of children: Not on file    Years of education: Not on file    Highest education level: Not on file   Occupational History    Not on file   Social Needs    Financial resource strain: Not hard at all    Food insecurity:     Worry: Never true     Inability: Never true    Transportation needs:     Medical: No     Non-medical: No   Tobacco Use    Smoking status: Never Smoker    Smokeless tobacco: Never Used   Substance and Sexual Activity    Alcohol use: No     Frequency: Monthly or less      Drinks per session: 1 or 2     Binge frequency: Less than monthly    Drug use: No    Sexual activity: Never   Lifestyle    Physical activity:     Days per week: 5 days     Minutes per session: 60 min    Stress: To some extent   Relationships    Social connections:     Talks on phone: More than three times a week     Gets together: More than three times a week     Attends Taoism service: Not on file     Active member of club or organization: Yes     Attends meetings of clubs or organizations: More than 4 times per year     Relationship status:    Other Topics Concern    Not on file   Social History Narrative    Not on file     Patient Active Problem List   Diagnosis    Chronic constipation    Family history of colonic polyps    Insulin resistance    Gastroesophageal reflux disease without esophagitis    Severe obesity (BMI >= 40)    Chronic headaches     Review of patient's allergies indicates:   Allergen Reactions    Penicillins Hives       The following were reviewed at this visit: active problem list, medication list, allergies, family history, social history, and health maintenance.    Medications:  Current Outpatient Medications on File Prior to Visit   Medication Sig Dispense Refill    sertraline (ZOLOFT) 50 MG tablet Take 1 tablet (50 mg total) by mouth once daily. 30 tablet 11    [DISCONTINUED] topiramate (TOPAMAX) 25 MG tablet Take 2 tabs nightly, one tablet in am 90 tablet 3    norethindrone ac-eth estradiol (JUNEL 1.5/30, 21,) 1.5-30 mg-mcg Tab Junel 1.5/30 (21) 1.5 mg-30 mcg tablet       No current facility-administered medications on file prior to visit.        Medications have been reviewed and reconciled with patient at this visit.  Barriers to medications present (no)    Adverse reactions to current medications (no)    Over the counter medications reviewed (Yes ), and if needed added to active Medication list at this visit.     Exam:  Wt Readings from Last 3 Encounters:    02/19/20 (!) 148.2 kg (326 lb 13.3 oz)   12/09/19 (!) 146.8 kg (323 lb 10.2 oz)   10/25/19 (!) 147.8 kg (325 lb 13.4 oz)     Temp Readings from Last 3 Encounters:   02/19/20 97.9 °F (36.6 °C)   12/09/19 98.2 °F (36.8 °C) (Oral)   10/16/19 96.5 °F (35.8 °C) (Tympanic)     BP Readings from Last 3 Encounters:   02/19/20 132/84   12/09/19 112/71   10/25/19 112/86     Pulse Readings from Last 3 Encounters:   02/19/20 86   12/09/19 67   10/16/19 72     Body mass index is 44.33 kg/m².      Review of Systems   Constitutional: Negative.  Negative for chills and fever.   HENT: Negative.  Negative for congestion, sinus pain and sore throat.    Eyes: Negative for blurred vision and double vision.   Respiratory: Negative for cough, sputum production, shortness of breath and wheezing.    Cardiovascular: Negative for chest pain, palpitations and leg swelling.   Gastrointestinal: Negative for abdominal pain, constipation, diarrhea, heartburn, nausea and vomiting.   Genitourinary: Negative.    Musculoskeletal: Positive for back pain and joint pain.   Skin: Negative.  Negative for rash.   Neurological: Positive for headaches.   Endo/Heme/Allergies: Negative.  Negative for polydipsia. Does not bruise/bleed easily.   Psychiatric/Behavioral: Negative for depression and substance abuse.     Physical Exam   Constitutional: She is oriented to person, place, and time. She appears well-developed and well-nourished. No distress.   HENT:   Head: Normocephalic and atraumatic.   Right Ear: External ear normal.   Left Ear: External ear normal.   Nose: Nose normal.   Mouth/Throat: Oropharynx is clear and moist. No oropharyngeal exudate.   Eyes: Pupils are equal, round, and reactive to light. Conjunctivae and EOM are normal. Right eye exhibits no discharge. Left eye exhibits no discharge.   Neck: Normal range of motion. Neck supple. No thyromegaly present.   Cardiovascular: Normal rate, regular rhythm, normal heart sounds and intact distal  pulses.   No murmur heard.  Pulmonary/Chest: Effort normal and breath sounds normal. No respiratory distress. She has no wheezes.   Abdominal: Soft. Bowel sounds are normal. She exhibits no distension and no mass. There is no tenderness.   Musculoskeletal: Normal range of motion. She exhibits tenderness. She exhibits no edema.        Left hip: She exhibits tenderness.        Lumbar back: She exhibits tenderness.        Back:         Legs:  Lymphadenopathy:     She has no cervical adenopathy.   Neurological: She is alert and oriented to person, place, and time. No cranial nerve deficit.   Skin: Capillary refill takes less than 2 seconds. She is not diaphoretic.   Psychiatric: She has a normal mood and affect. Her behavior is normal. Judgment and thought content normal.   Nursing note and vitals reviewed.      Laboratory Reviewed ({N/A)  Lab Results   Component Value Date    WBC 8.93 12/09/2019    HGB 13.8 12/09/2019    HCT 42.9 12/09/2019     (H) 12/09/2019    CHOL 197 04/20/2018    TRIG 114 04/20/2018    HDL 31 04/20/2018    ALT 50 (H) 12/09/2019    AST 31 12/09/2019     12/09/2019    K 3.8 12/09/2019     12/09/2019    CREATININE 0.7 12/09/2019    BUN 13 12/09/2019    CO2 22 (L) 12/09/2019    TSH 1.968 10/10/2019    HGBA1C 5.6 10/10/2019       Allegra was seen today for headache and pain.    Diagnoses and all orders for this visit:    Chronic bilateral low back pain, unspecified whether sciatica present  -     gabapentin (NEURONTIN) 100 MG capsule; Take 1 capsule (100 mg total) by mouth every evening.    Chronic nonintractable headache, unspecified headache type  -     gabapentin (NEURONTIN) 100 MG capsule; Take 1 capsule (100 mg total) by mouth every evening.    Chronic left-sided low back pain with left-sided sciatica  -     MRI Lumbar Spine Without Contrast; Future  -     gabapentin (NEURONTIN) 100 MG capsule; Take 1 capsule (100 mg total) by mouth every evening.                Care Plan/Goals:  Reviewed  (N/A)  Goals    None         Follow up: Follow up in about 3 months (around 5/19/2020).    After visit summary was printed and given to patient upon discharge today.  Patient goals and care plan are included in After Visit Summary.

## 2020-02-27 ENCOUNTER — TELEPHONE (OUTPATIENT)
Dept: RADIOLOGY | Facility: HOSPITAL | Age: 40
End: 2020-02-27

## 2020-02-27 ENCOUNTER — HOSPITAL ENCOUNTER (OUTPATIENT)
Dept: RADIOLOGY | Facility: HOSPITAL | Age: 40
Discharge: HOME | End: 2020-02-27
Attending: FAMILY MEDICINE
Payer: COMMERCIAL

## 2020-02-27 DIAGNOSIS — G89.29 CHRONIC LEFT-SIDED LOW BACK PAIN WITH LEFT-SIDED SCIATICA: ICD-10-CM

## 2020-02-27 DIAGNOSIS — M54.42 CHRONIC LEFT-SIDED LOW BACK PAIN WITH LEFT-SIDED SCIATICA: ICD-10-CM

## 2020-02-27 PROCEDURE — 72148 MRI LUMBAR SPINE W/O DYE: CPT | Mod: 26,,, | Performed by: RADIOLOGY

## 2020-02-27 PROCEDURE — 72148 MRI LUMBAR SPINE WITHOUT CONTRAST: ICD-10-PCS | Mod: 26,,, | Performed by: RADIOLOGY

## 2020-02-27 PROCEDURE — 72148 MRI LUMBAR SPINE W/O DYE: CPT | Mod: TC

## 2020-03-03 ENCOUNTER — PATIENT OUTREACH (OUTPATIENT)
Dept: ADMINISTRATIVE | Facility: OTHER | Age: 40
End: 2020-03-03

## 2020-03-05 ENCOUNTER — HOSPITAL ENCOUNTER (OUTPATIENT)
Dept: RADIOLOGY | Facility: HOSPITAL | Age: 40
Discharge: HOME | End: 2020-03-05
Attending: PHYSICIAN ASSISTANT
Payer: COMMERCIAL

## 2020-03-05 ENCOUNTER — OFFICE VISIT (OUTPATIENT)
Dept: PAIN MEDICINE | Facility: CLINIC | Age: 40
End: 2020-03-05
Payer: COMMERCIAL

## 2020-03-05 VITALS
BODY MASS INDEX: 39.68 KG/M2 | DIASTOLIC BLOOD PRESSURE: 81 MMHG | HEIGHT: 72 IN | HEART RATE: 75 BPM | SYSTOLIC BLOOD PRESSURE: 125 MMHG | WEIGHT: 293 LBS

## 2020-03-05 DIAGNOSIS — G89.29 CHRONIC PAIN OF BOTH KNEES: ICD-10-CM

## 2020-03-05 DIAGNOSIS — M25.562 CHRONIC PAIN OF BOTH KNEES: ICD-10-CM

## 2020-03-05 DIAGNOSIS — R10.2 PELVIC PAIN: ICD-10-CM

## 2020-03-05 DIAGNOSIS — R10.2 PELVIC PAIN: Primary | ICD-10-CM

## 2020-03-05 DIAGNOSIS — M25.561 CHRONIC PAIN OF BOTH KNEES: ICD-10-CM

## 2020-03-05 DIAGNOSIS — M47.816 LUMBAR SPONDYLOSIS: ICD-10-CM

## 2020-03-05 PROCEDURE — 72170 X-RAY EXAM OF PELVIS: CPT | Mod: TC

## 2020-03-05 PROCEDURE — 99999 PR PBB SHADOW E&M-EST. PATIENT-LVL III: ICD-10-PCS | Mod: PBBFAC,,, | Performed by: PAIN MEDICINE

## 2020-03-05 PROCEDURE — 99999 PR PBB SHADOW E&M-EST. PATIENT-LVL III: CPT | Mod: PBBFAC,,, | Performed by: PAIN MEDICINE

## 2020-03-05 PROCEDURE — 3008F BODY MASS INDEX DOCD: CPT | Mod: CPTII,S$GLB,, | Performed by: PAIN MEDICINE

## 2020-03-05 PROCEDURE — 72170 X-RAY EXAM OF PELVIS: CPT | Mod: 26,,, | Performed by: RADIOLOGY

## 2020-03-05 PROCEDURE — 3008F PR BODY MASS INDEX (BMI) DOCUMENTED: ICD-10-PCS | Mod: CPTII,S$GLB,, | Performed by: PAIN MEDICINE

## 2020-03-05 PROCEDURE — 99214 PR OFFICE/OUTPT VISIT, EST, LEVL IV, 30-39 MIN: ICD-10-PCS | Mod: S$GLB,,, | Performed by: PAIN MEDICINE

## 2020-03-05 PROCEDURE — 99214 OFFICE O/P EST MOD 30 MIN: CPT | Mod: S$GLB,,, | Performed by: PAIN MEDICINE

## 2020-03-05 PROCEDURE — 72170 XR PELVIS ROUTINE AP: ICD-10-PCS | Mod: 26,,, | Performed by: RADIOLOGY

## 2020-03-05 RX ORDER — BACLOFEN 10 MG/1
10 TABLET ORAL 3 TIMES DAILY PRN
Qty: 30 TABLET | Refills: 3 | Status: SHIPPED | OUTPATIENT
Start: 2020-03-05 | End: 2020-09-09

## 2020-03-05 NOTE — PROGRESS NOTES
Chief Pain Complaint:  Follow-up; Pelvic Pain; and Low-back Pain    History of Present Illness:   Ms. Burdick returns to clinic for follow-up.  She continues to have low back pain in addition to anterior pelvic pain. Since her last visit she underwent bilateral knee x-rays and a lumbar MRI which shows mild arthritis at 1 level and mild disc bulge at 1 level.  She does have mild bilateral medial narrowing of the knee joint spaces.  She has been working on weight loss including a plant based diet and key toe diet.  She would like to exercise however causes her low back in her knee pain to be exacerbated with continued exercising.  She has been taking gabapentin 100 mg q.h.s. with minimal benefit.  She reports that sometimes her back will lock up on her in the muscles give very tight and is difficult for the muscle start relax.    Initial HPI:  This patient is a 39 y.o. female who presents today complaining of the above noted pain/s. The patient describes the pain as follows.  Ms. Burdick is a new patient to clinic with complaints low back pain, pelvic pain, bilateral knee pain.  She has been having symptoms off and on for several years with the pelvic pain increasing in frequency becoming daily.  Today she rates her pain as a 7/10 she has difficulty describing her pelvic pain and describes it as hurting.  Primary located around the pubic symphysis in the vagina.  She has been having irregular cycles for years with a cycle in May of 2019 and she reports the previous menstrual cycle she had was in 2016.  She has had abdominal surgery however this all to place when she was approximately 6 years old.  She reports having pancreatitis at that time and ultimately had cholecystectomy, appendectomy and bile duct reconstruction.  She denies having had any other abdominal surgeries.  She denies having any changes with eating and with bowel movements.  She finds her symptoms are worse with walking and her somewhat improved with  rest.  Her knee pain does improved with rest.  She has been physical therapy recently and will start again in approximately 6 days.  She has been using meloxicam which has not been beneficial and has found ibuprofen over-the-counter to be more helpful.  She denies having bowel bladder difficulties.    Previous Therapy:  Medications:Mobic and Ibuprofen, zoloft, gabapentin  Injections: Knee Joint Injection  Surgeries: None  Physical Therapy: Completed in the Past    Past Surgical History:   Procedure Laterality Date    APPENDECTOMY      BILE DUCT EXPLORATION      CHOLECYSTECTOMY       Imaging / Labs / Studies (reviewed on 3/5/2020):    Review of Systems:  Review of Systems   Constitutional: Negative for fever.   Eyes: Negative for blurred vision.   Respiratory: Negative for cough and wheezing.    Cardiovascular: Negative for chest pain and orthopnea.   Gastrointestinal: Negative for constipation, diarrhea, nausea and vomiting.   Genitourinary: Negative for dysuria.   Musculoskeletal: Positive for back pain and joint pain.   Skin: Negative for itching and rash.   Neurological: Negative for weakness.   Endo/Heme/Allergies: Does not bruise/bleed easily.       Physical Exam:  /81   Pulse 75   Ht 6' (1.829 m)   Wt (!) 150.8 kg (332 lb 5.5 oz)   LMP 05/06/2019   BMI 45.07 kg/m²  (reviewed on 3/5/2020)\  General    Constitutional: She is oriented to person, place, and time. She appears well-developed and well-nourished.   HENT:   Head: Normocephalic and atraumatic.   Eyes: EOM are normal.   Neck: Neck supple.   Cardiovascular: Normal rate.    Pulmonary/Chest: Effort normal.   Abdominal: She exhibits no distension.   Neurological: She is alert and oriented to person, place, and time. No cranial nerve deficit.   Psychiatric: She has a normal mood and affect.     General Musculoskeletal Exam   Gait: normal       Right Knee Exam     Inspection   Swelling: absent    Tenderness   The patient is tender to palpation of  the medial joint line.    Range of Motion   Extension: normal   Flexion: normal     Other   Sensation: normal    Comments:  Minimal crepitus with flexion and extension    Left Knee Exam     Inspection   Swelling: absent    Tenderness   The patient tender to palpation of the medial joint line.    Range of Motion   Extension: normal   Flexion: normal     Other   Sensation: normal    Comments:  Minimal crepitus with flexion and extensionBack (L-Spine & T-Spine) / Neck (C-Spine) Exam     Tenderness Right paramedian tenderness of the Lower L-Spine. Left paramedian tenderness of the Lower L-Spine.     Back (L-Spine & T-Spine) Range of Motion   Extension: normal   Flexion: normal   Lateral bend right: normal   Lateral bend left: normal   Rotation right: normal   Rotation left: normal     Spinal Sensation   Right Side Sensation  L-Spine Level: hypersensitive  Left Side Sensation  L-Spine Level: hypersensitive    Comments:  Sensation like tacked to light touch bilateral lower extremities in the lateral aspects however decreased sensation to light touch in the medial aspects of bilateral lower extremities      Muscle Strength   Right Lower Extremity   Hip Flexion: 5/5   Quadriceps:  5/5   Hamstrin/5   Left Lower Extremity   Hip Flexion: 5/5   Quadriceps:  5/5   Hamstrin/5     Reflexes     Left Side  Quadriceps:  2+  Achilles:  2+    Right Side   Quadriceps:  2+  Achilles:  2+      Assessment  Knee OA  Lumbar Spondylosis    1. 39 y.o. year old patient with PMH of   Past Medical History:   Diagnosis Date    IBS (irritable bowel syndrome)     Obesity     Pancreatitis       presenting with pain located pelvis, lumbar spine, bilateral knees  2. Pain Generators / Etiology: Lumbar Spondylosis and Knee Osteorarthritis, pelvic pain  3. Failed Meds (E- Effective, NE- Not Effective):  Mobic-not effective; ibuprofen-effective  4. Physical Therapy - Completed in the Past and will restart in 6 days  5. Psychological  comorbidities - None  6. Anticoagulants / Antiplatelets: None     PLAN:  1. Medications :  Topamax has been stopped by another provider; and she was switched to gabapentin 100 mg q.h.s.; will add baclofen 10 mg tablets 3 times daily p.r.n.; have also recommended Tylenol Arthritis strength over-the-counter not to exceed 3000 mg daily  2. PT - patient continues to exercise on her own as tolerated; we discussed her shoes of swimming as this would cause low impact on her knees and pelvis and lumbar spine in addition to riding a bicycle which would also be low impact exercise to help with weight loss  3. Psychological - continue all medications as prescribed  4. Labs - obtain  none  5. Imaging - will obtain a pelvis x-ray today; reviewed lumbar MRI and knee x-rays the patient today in clinic  6. Interventions - schedule none  7. Referrals - none  8. Records - none  9. Follow up visit - follow up in clinic in 8 weeks  10. Patient Questions - answered all of the patient's questions regarding diagnosis, therapy, and treatment  11.  This condition does not require this patient to take time off of work    GILMAR Sofia MD  Interventional Pain  [facility] [place]

## 2020-03-05 NOTE — PATIENT INSTRUCTIONS
-recommend Tylenol Arthritis strength over-the-counter not to exceed 3000 mg daily  -obtain pelvic x-ray today  -have recommended patient participate in exercise regimen including swimming  -continue gabapentin at 100 mg at night for now can increase to 300 mg in the future  -will start baclofen 10 mg 3 times daily as needed  -follow up in clinic in 8 weeks

## 2020-03-24 ENCOUNTER — PATIENT MESSAGE (OUTPATIENT)
Dept: PAIN MEDICINE | Facility: CLINIC | Age: 40
End: 2020-03-24

## 2020-04-06 ENCOUNTER — TELEPHONE (OUTPATIENT)
Dept: INTERNAL MEDICINE | Facility: CLINIC | Age: 40
End: 2020-04-06

## 2020-04-06 ENCOUNTER — PATIENT MESSAGE (OUTPATIENT)
Dept: INTERNAL MEDICINE | Facility: CLINIC | Age: 40
End: 2020-04-06

## 2020-04-06 NOTE — TELEPHONE ENCOUNTER
Called but couldn't leave a message. Appointment scheduled for video visit tomorrow and instructions sent.

## 2020-04-06 NOTE — TELEPHONE ENCOUNTER
----- Message from Ann Love sent at 4/6/2020  1:33 PM CDT -----  Contact: pt  Type:  Needs Medical Advice    Who Called: patient  Symptoms (please be specific): fever,congestion,body ache, headache   How long has patient had these symptoms:  2days  Pharmacy name and phone #:  [facility]/[place]  Would the patient rather a call back or a response via [facility]? Call back  Best Call Back Number: [phone]  Additional Information: na

## 2020-04-07 ENCOUNTER — OFFICE VISIT (OUTPATIENT)
Dept: INTERNAL MEDICINE | Facility: CLINIC | Age: 40
End: 2020-04-07
Payer: COMMERCIAL

## 2020-04-07 VITALS — DIASTOLIC BLOOD PRESSURE: 81 MMHG | SYSTOLIC BLOOD PRESSURE: 125 MMHG

## 2020-04-07 DIAGNOSIS — R05.9 COUGH: ICD-10-CM

## 2020-04-07 PROCEDURE — 99213 PR OFFICE/OUTPT VISIT, EST, LEVL III, 20-29 MIN: ICD-10-PCS | Mod: 95,,, | Performed by: FAMILY MEDICINE

## 2020-04-07 PROCEDURE — 99213 OFFICE O/P EST LOW 20 MIN: CPT | Mod: 95,,, | Performed by: FAMILY MEDICINE

## 2020-04-07 NOTE — PROGRESS NOTES
Brook Burdick  04/07/2020  [id number]    Adamaris Arguello MD  Patient Care Team:  Adamaris Arguello MD as PCP - General (Family Medicine)  Alice Walker MD as Obstetrician (Obstetrics)  Yue Chan III, MD (Obstetrics and Gynecology)  Has the patient seen any provider outside of the [facility] network since the last visit? (no). If yes, HIPPA forms completed and records requested.    The patient location is: home  The chief complaint leading to consultation is: cough  Visit type: Virtual visit with synchronous audio and video  Total time spent with patient: 8:42- 9;00  Each patient to whom he or she provides medical services by telemedicine is:  (1) informed of the relationship between the physician and patient and the respective role of any other health care provider with respect to management of the patient; and (2) notified that he or she may decline to receive medical services by telemedicine and may withdraw from such care at any time.    Notes: Fever 100.2      Visit Type:an urgent visit for a new problem    Chief Complaint:  No chief complaint on file.      History of Present Illness:  She reports cough, dry. She feels rattle in chest.  She reports she had a tmax 100.2.   She reports took a daytime cough med.  She took sudofed.  She reports the OTC meds helped.  She does not feel SOB.  She reports this started Sunday.    She has been staying home.          History:  Past Medical History:   Diagnosis Date    IBS (irritable bowel syndrome)     Obesity     Pancreatitis      Past Surgical History:   Procedure Laterality Date    APPENDECTOMY      BILE DUCT EXPLORATION      CHOLECYSTECTOMY       Family History   Problem Relation Age of Onset    Atrial fibrillation Mother     Diabetes Father     Hypertension Father     Cancer Maternal Grandfather      Social History     Socioeconomic History    Marital status: Single     Spouse name: Not on file    Number of children: Not on file     Years of education: Not on file    Highest education level: Not on file   Occupational History    Not on file   Social Needs    Financial resource strain: Not hard at all    Food insecurity:     Worry: Never true     Inability: Never true    Transportation needs:     Medical: No     Non-medical: No   Tobacco Use    Smoking status: Never Smoker    Smokeless tobacco: Never Used   Substance and Sexual Activity    Alcohol use: No     Frequency: Monthly or less     Drinks per session: 1 or 2     Binge frequency: Less than monthly    Drug use: No    Sexual activity: Never   Lifestyle    Physical activity:     Days per week: 5 days     Minutes per session: 60 min    Stress: To some extent   Relationships    Social connections:     Talks on phone: More than three times a week     Gets together: More than three times a week     Attends Sabianism service: Not on file     Active member of club or organization: Yes     Attends meetings of clubs or organizations: More than 4 times per year     Relationship status:    Other Topics Concern    Not on file   Social History Narrative    Not on file     Patient Active Problem List   Diagnosis    Chronic constipation    Family history of colonic polyps    Insulin resistance    Gastroesophageal reflux disease without esophagitis    Severe obesity (BMI >= 40)    Chronic headaches    Cough     Review of patient's allergies indicates:   Allergen Reactions    Penicillins Hives       The following were reviewed at this visit: active problem list, medication list, allergies, family history, social history, and health maintenance.    Medications:  Current Outpatient Medications on File Prior to Visit   Medication Sig Dispense Refill    baclofen (LIORESAL) 10 MG tablet Take 1 tablet (10 mg total) by mouth 3 (three) times daily as needed. 30 tablet 3    gabapentin (NEURONTIN) 100 MG capsule Take 1 capsule (100 mg total) by mouth every evening. 30 capsule 3     norethindrone ac-eth estradiol (JUNEL 1.5/30, 21,) 1.5-30 mg-mcg Tab Junel 1.5/30 (21) 1.5 mg-30 mcg tablet      sertraline (ZOLOFT) 50 MG tablet Take 1 tablet (50 mg total) by mouth once daily. 30 tablet 11     No current facility-administered medications on file prior to visit.        Medications have been reviewed and reconciled with patient at this visit.  Barriers to medications present (no)    Adverse reactions to current medications (no)    Over the counter medications reviewed (Yes ), and if needed added to active Medication list at this visit.     Exam:  Wt Readings from Last 3 Encounters:   03/05/20 (!) 150.8 kg (332 lb 5.5 oz)   02/19/20 (!) 148.2 kg (326 lb 13.3 oz)   12/09/19 (!) 146.8 kg (323 lb 10.2 oz)     Temp Readings from Last 3 Encounters:   02/19/20 97.9 °F (36.6 °C)   12/09/19 98.2 °F (36.8 °C) (Oral)   10/16/19 96.5 °F (35.8 °C) (Tympanic)     BP Readings from Last 3 Encounters:   04/07/20 125/81   03/05/20 125/81   02/19/20 132/84     Pulse Readings from Last 3 Encounters:   03/05/20 75   02/19/20 86   12/09/19 67     There is no height or weight on file to calculate BMI.      Review of Systems   Constitutional: Positive for fever.   HENT: Positive for congestion.    Respiratory: Positive for cough. Negative for sputum production and shortness of breath.      Physical Exam   Constitutional: She is oriented to person, place, and time. She appears well-developed and well-nourished.   Pulmonary/Chest: Effort normal. No respiratory distress.   Neurological: She is alert and oriented to person, place, and time.   Psychiatric: She has a normal mood and affect. Her behavior is normal. Judgment and thought content normal.   Vitals reviewed.      Laboratory Reviewed ({Yes)  Lab Results   Component Value Date    WBC 8.93 12/09/2019    HGB 13.8 12/09/2019    HCT 42.9 12/09/2019     (H) 12/09/2019    CHOL 197 04/20/2018    TRIG 114 04/20/2018    HDL 31 04/20/2018    ALT 50 (H) 12/09/2019    AST  31 12/09/2019     12/09/2019    K 3.8 12/09/2019     12/09/2019    CREATININE 0.7 12/09/2019    BUN 13 12/09/2019    CO2 22 (L) 12/09/2019    TSH 1.968 10/10/2019    HGBA1C 5.6 10/10/2019       Diagnoses and all orders for this visit:    Cough    Supportive care  OTC meds  Monitor temp   Does not want COVID testing today. Will consider if sx persist.  Shelter in place.              Care Plan/Goals: Reviewed  (Yes)  Goals    None         Follow up: No follow-ups on file.    After visit summary was printed and given to patient upon discharge today.  Patient goals and care plan are included in After Visit Summary.

## 2020-04-08 ENCOUNTER — PATIENT MESSAGE (OUTPATIENT)
Dept: INTERNAL MEDICINE | Facility: CLINIC | Age: 40
End: 2020-04-08

## 2020-04-08 DIAGNOSIS — R05.9 COUGH: Primary | ICD-10-CM

## 2020-04-09 ENCOUNTER — CLINICAL SUPPORT (OUTPATIENT)
Dept: INTERNAL MEDICINE | Facility: CLINIC | Age: 40
End: 2020-04-09
Payer: COMMERCIAL

## 2020-04-09 ENCOUNTER — PATIENT MESSAGE (OUTPATIENT)
Dept: INTERNAL MEDICINE | Facility: CLINIC | Age: 40
End: 2020-04-09

## 2020-04-09 DIAGNOSIS — R05.9 COUGH: ICD-10-CM

## 2020-04-09 PROCEDURE — U0002 COVID-19 LAB TEST NON-CDC: HCPCS

## 2020-04-09 RX ORDER — ALBUTEROL SULFATE 90 UG/1
2 AEROSOL, METERED RESPIRATORY (INHALATION) EVERY 6 HOURS PRN
Qty: 18 G | Refills: 0 | Status: SHIPPED | OUTPATIENT
Start: 2020-04-09 | End: 2021-03-18 | Stop reason: SDUPTHER

## 2020-04-10 LAB — SARS-COV-2 RNA RESP QL NAA+PROBE: NOT DETECTED

## 2020-04-17 ENCOUNTER — PATIENT OUTREACH (OUTPATIENT)
Dept: ADMINISTRATIVE | Facility: OTHER | Age: 40
End: 2020-04-17

## 2020-04-23 ENCOUNTER — PATIENT OUTREACH (OUTPATIENT)
Dept: ADMINISTRATIVE | Facility: OTHER | Age: 40
End: 2020-04-23

## 2020-04-23 ENCOUNTER — PATIENT MESSAGE (OUTPATIENT)
Dept: PAIN MEDICINE | Facility: CLINIC | Age: 40
End: 2020-04-23

## 2020-04-23 ENCOUNTER — TELEPHONE (OUTPATIENT)
Dept: PAIN MEDICINE | Facility: CLINIC | Age: 40
End: 2020-04-23

## 2020-04-23 ENCOUNTER — OFFICE VISIT (OUTPATIENT)
Dept: PAIN MEDICINE | Facility: CLINIC | Age: 40
End: 2020-04-23
Payer: COMMERCIAL

## 2020-04-23 DIAGNOSIS — M54.16 LUMBAR RADICULOPATHY: ICD-10-CM

## 2020-04-23 DIAGNOSIS — M47.816 LUMBAR SPONDYLOSIS: Primary | ICD-10-CM

## 2020-04-23 PROCEDURE — 99213 OFFICE O/P EST LOW 20 MIN: CPT | Mod: 95,,, | Performed by: PHYSICAL MEDICINE & REHABILITATION

## 2020-04-23 PROCEDURE — 99213 PR OFFICE/OUTPT VISIT, EST, LEVL III, 20-29 MIN: ICD-10-PCS | Mod: 95,,, | Performed by: PHYSICAL MEDICINE & REHABILITATION

## 2020-04-23 RX ORDER — GABAPENTIN 300 MG/1
CAPSULE ORAL
Qty: 90 CAPSULE | Refills: 11 | Status: SHIPPED | OUTPATIENT
Start: 2020-04-23 | End: 2021-05-24

## 2020-04-23 RX ORDER — DICLOFENAC SODIUM 75 MG/1
75 TABLET, DELAYED RELEASE ORAL 2 TIMES DAILY PRN
Qty: 60 TABLET | Refills: 0 | Status: SHIPPED | OUTPATIENT
Start: 2020-04-23 | End: 2020-05-18

## 2020-04-23 NOTE — PROGRESS NOTES
Chronic Pain-Tele-Medicine-Established Note (Follow up visit)    The patient location is:  Place of residence  The chief complaint leading to consultation is:  Hip/pelvic pain  Visit type: Virtual visit with synchronous audio and video  Total time spent with patient:  10-15 minutes  Each patient to whom he or she provides medical services by telemedicine is: (1) informed of the relationship between the physician and patient and the respective role of any other health care provider with respect to management of the patient; and (2) notified that he or she may decline to receive medical services by telemedicine and may withdraw from such care at any time.    Notes:    SUBJECTIVE:    Brook Burdick presents to tele-medicine appointment for a follow-up appointment for hip/pelvic pain.  She was last seen for follow-up evaluation on 03/05/2020 with Dr. Sofia who recommended Tylenol arthritis extra-strength and physical therapy including pool therapy along with starting baclofen 10 mg t.i.d. as needed for pain.  She was also continued on gabapentin 100 mg nightly with plan to increase if needed.  Since the last visit, Brook Burdick states the pain has been persistant. Current pain intensity is 7/10.    Interval HPI:  Ms. Burdick returns to clinic for follow-up.  She continues to have low back pain in addition to anterior pelvic pain. Since her last visit she underwent bilateral knee x-rays and a lumbar MRI which shows mild arthritis at 1 level and mild disc bulge at 1 level.  She does have mild bilateral medial narrowing of the knee joint spaces.  She has been working on weight loss including a plant based diet and key toe diet.  She would like to exercise however causes her low back in her knee pain to be exacerbated with continued exercising.  She has been taking gabapentin 100 mg q.h.s. with minimal benefit.  She reports that sometimes her back will lock up on her in the muscles give very tight and is difficult  for the muscle start relax.     Initial HPI:  This patient is a 39 y.o. female who presents today complaining of the above noted pain/s. The patient describes the pain as follows.  Ms. Burdick is a new patient to clinic with complaints low back pain, pelvic pain, bilateral knee pain.  She has been having symptoms off and on for several years with the pelvic pain increasing in frequency becoming daily.  Today she rates her pain as a 7/10 she has difficulty describing her pelvic pain and describes it as hurting.  Primary located around the pubic symphysis in the vagina.  She has been having irregular cycles for years with a cycle in May of 2019 and she reports the previous menstrual cycle she had was in 2016.  She has had abdominal surgery however this all to place when she was approximately 6 years old.  She reports having pancreatitis at that time and ultimately had cholecystectomy, appendectomy and bile duct reconstruction.  She denies having had any other abdominal surgeries.  She denies having any changes with eating and with bowel movements.  She finds her symptoms are worse with walking and her somewhat improved with rest.  Her knee pain does improved with rest.  She has been physical therapy recently and will start again in approximately 6 days.  She has been using meloxicam which has not been beneficial and has found ibuprofen over-the-counter to be more helpful.  She denies having bowel bladder difficulties.     Previous Therapy:  Medications:Mobic and Ibuprofen, zoloft, gabapentin  Injections: Knee Joint Injection  Surgeries: None  Physical Therapy: Completed in the Past     report:  Reviewed and consistent with medication use as prescribed.        Imaging:   X-ray pelvis 03/05/2020:  There is no radiographic evidence of acute osseous, articular, or soft tissue abnormality.  Mild degenerative marginal productive changes are present at the bilateral acetabula.    MRI lumbar spine 02/27/2020:  Lumbar  spine alignment is within normal limits. The vertebral body heights are well maintained, with no fracture.  No marrow signal abnormality suspicious for an infiltrative process.  There is a somewhat transitional appearance of the lumbar spine.  For the purposes of this exam the lowest residual disc space is labeled as S1-S2 on the axial images.    The conus medullaris terminates at approximately the L1-L2 disk space.  The adjacent soft tissue structures show no significant abnormalities.  There is disc desiccation noted at the L5-S1 disc space with no significant disc space narrowing.  There is also some minimal disc desiccation seen along the periphery of the disc at the L3-4 level.    L1-L2: No significant central canal or neural foraminal narrowing.    L2-L3: No significant central canal or neural foraminal narrowing.    L3-L4: Mild diffuse disc bulge resulting in mild effacement of the ventral thecal sac.  No significant neural foraminal canal narrowing.  Hyperintensity noted within the posterior and central portion of the disc most consistent with an annular tear.    L4-L5:  No significant central canal or neural foraminal narrowing.    L5-S1:  Mild-to-moderate right-sided facet arthropathy noted.  No significant central or neural foraminal canal narrowing.    X-ray lumbar spine 08/06/2019:  Vertebral body heights and alignment maintained.  Intervertebral disc spaces relatively maintained.  No acute osseous abnormality.  Soft tissues demonstrate no acute abnormality.     X-ray left hip 07/31/2019:  No definite acute fracture or dislocation although cross-sectional imaging is more sensitive for this finding.  Hip joints are symmetric in appearance.  No evidence of avascular necrosis.  Joint spaces are maintained.    X-ray bilateral knees 08/06/2019:  No acute osseous abnormality.  Mild degenerative changes bilaterally without significant joint space loss.  Small bilateral suprapatellar joint effusions  possible.      PMHx,PSHx, Social history, and Family history:  I have reviewed the patient's medical, surgical, social, and family history in detail and updated the computerized patient record.      Review of patient's allergies indicates:   Allergen Reactions    Penicillins Hives       Current Outpatient Medications   Medication Sig    albuterol (VENTOLIN HFA) 90 mcg/actuation inhaler Inhale 2 puffs into the lungs every 6 (six) hours as needed for Wheezing. Rescue    baclofen (LIORESAL) 10 MG tablet Take 1 tablet (10 mg total) by mouth 3 (three) times daily as needed.    diclofenac (VOLTAREN) 75 MG EC tablet Take 1 tablet (75 mg total) by mouth 2 (two) times daily as needed.    gabapentin (NEURONTIN) 300 MG capsule 1 cap qhs X 7 days then 1 cap BID X 7 days then 1 cap AM (300mg) and 2 cap PM (600mg) and continue. Stay at the most comfortable dose.    norethindrone ac-eth estradiol (JUNEL 1.5/30, 21,) 1.5-30 mg-mcg Tab Junel 1.5/30 (21) 1.5 mg-30 mcg tablet    sertraline (ZOLOFT) 50 MG tablet Take 1 tablet (50 mg total) by mouth once daily.     No current facility-administered medications for this visit.        REVIEW OF SYSTEMS:    GENERAL:  No weight loss, malaise or fevers.  HEENT:   No recent changes in vision or hearing  NECK:  Negative for lumps, no difficulty with swallowing.  RESPIRATORY:  Negative for cough, wheezing or shortness of breath, patient denies any recent URI.  CARDIOVASCULAR:  Negative for chest pain, leg swelling or palpitations.  GI:  Negative for abdominal discomfort, blood in stools or black stools or change in bowel habits.  MUSCULOSKELETAL:  See HPI.  SKIN:  Negative for lesions, rash, and itching.  PSYCH:  No mood disorder or recent psychosocial stressors.  Patients sleep is not disturbed secondary to pain.  HEMATOLOGY/LYMPHOLOGY:  Negative for prolonged bleeding, bruising easily or swollen nodes.  Patient is not currently taking any anti-coagulants  NEURO:   No history of  headaches, syncope, paralysis, seizures or tremors.  All other reviewed and negative other than HPI.    OBJECTIVE:    Physical exam:  GENERAL: Well appearing, in no acute distress, alert and oriented x3.   morbidly obese  PSYCH:  Mood and affect appropriate.  SKIN: Skin color, texture, turgor normal, no rashes or lesions.  HEAD/FACE:  Normocephalic, atraumatic. Cranial nerves grossly intact.  CV:  No peripheral edema noted  PULM:  No difficulty breathing     Neuro/MSK:  BACK: Straight leg raising in the seated position (self-performed) is negative to radicular pain.  mild-to-moderate pain to palpation over the facet joints of the lumbar spine and lumbar paraspinals.  Limited range of motion secondary to pain reproduction.  EXTREMITIES:  No deformities, edema, or skin discoloration.   MUSCULOSKELETAL:   No atrophy or tone abnormalities are noted.  NEURO:  Able to toe walk and heel walk with mild difficulty  GAIT:  Antalgic.        LABS:  Lab Results   Component Value Date    WBC 8.93 12/09/2019    HGB 13.8 12/09/2019    HCT 42.9 12/09/2019    MCV 93 12/09/2019     (H) 12/09/2019       CMP  Sodium   Date Value Ref Range Status   12/09/2019 141 136 - 145 mmol/L Final     Potassium   Date Value Ref Range Status   12/09/2019 3.8 3.5 - 5.1 mmol/L Final     Chloride   Date Value Ref Range Status   12/09/2019 108 95 - 110 mmol/L Final     CO2   Date Value Ref Range Status   12/09/2019 22 (L) 23 - 29 mmol/L Final     Glucose   Date Value Ref Range Status   12/09/2019 129 (H) 70 - 110 mg/dL Final     BUN, Bld   Date Value Ref Range Status   12/09/2019 13 6 - 20 mg/dL Final     Creatinine   Date Value Ref Range Status   12/09/2019 0.7 0.5 - 1.4 mg/dL Final     Calcium   Date Value Ref Range Status   12/09/2019 9.0 8.7 - 10.5 mg/dL Final     Total Protein   Date Value Ref Range Status   12/09/2019 7.7 6.0 - 8.4 g/dL Final     Albumin   Date Value Ref Range Status   12/09/2019 4.0 3.5 - 5.2 g/dL Final     Total Bilirubin    Date Value Ref Range Status   12/09/2019 1.0 0.1 - 1.0 mg/dL Final     Comment:     For infants and newborns, interpretation of results should be based  on gestational age, weight and in agreement with clinical  observations.  Premature Infant recommended reference ranges:  Up to 24 hours.............<8.0 mg/dL  Up to 48 hours............<12.0 mg/dL  3-5 days..................<15.0 mg/dL  6-29 days.................<15.0 mg/dL       Alkaline Phosphatase   Date Value Ref Range Status   12/09/2019 72 55 - 135 U/L Final     AST   Date Value Ref Range Status   12/09/2019 31 10 - 40 U/L Final     ALT   Date Value Ref Range Status   12/09/2019 50 (H) 10 - 44 U/L Final     Anion Gap   Date Value Ref Range Status   12/09/2019 11 8 - 16 mmol/L Final     eGFR if    Date Value Ref Range Status   12/09/2019 >60 >60 mL/min/1.73 m^2 Final     eGFR if non    Date Value Ref Range Status   12/09/2019 >60 >60 mL/min/1.73 m^2 Final     Comment:     Calculation used to obtain the estimated glomerular filtration  rate (eGFR) is the CKD-EPI equation.          Lab Results   Component Value Date    LABA1C 5.5 04/20/2018    HGBA1C 5.6 10/10/2019             ASSESSMENT: 39 y.o. year old female with low back and hip/pelvic pain, consistent with     1. Lumbar spondylosis  gabapentin (NEURONTIN) 300 MG capsule    diclofenac (VOLTAREN) 75 MG EC tablet   2. Lumbar radiculopathy  gabapentin (NEURONTIN) 300 MG capsule    diclofenac (VOLTAREN) 75 MG EC tablet         PLAN:   - Interventions: None at this time.  - Anticoagulation: no  - Medications: I have stressed the importance of physical activity and a home exercise plan to help with pain and improve health. and Patient can continue with medications for now since they are providing benefits, using them appropriately, and without side effects.  Adjust gabapentin to 300 mg nightly x7 days, then increase to the 300 mg b.i.d. x7 days, then increase to 300 mg in the  morning and 600 mg in the evening.  We will also add on diclofenac 75 mg up to 2 times daily as needed for inflammatory pain.  - Therapy:  Advised the patient to continue activities and home exercises as tolerated.  We will reinforcement physical therapy prescription once COVID-19 pandemic subsides.  - Psychological:  Discussed coping mechanisms to help address chronic pain issues  - Labs:  Reviewed  - Imaging:  Reviewed imaging available  - Consults/Referrals:  None at this time  - Records:  Reviewed/Obtain old records from outside physicians and imaging  - Follow up visit: return to clinic in 4 weeks with Dr. Sofia or as needed  - Counseled patient regarding the importance of activity modification and physical therapy  - This condition does not require this patient to take time off of work, and the primary goal of our Pain Management services is to improve the patient's functional capacity.  - Patient Questions: Answered all of the patient's questions regarding diagnosis, therapy, and treatment    The above plan and management options were discussed at length with patient. Patient is in agreement with the above and verbalized understanding.      Ricki Noriega MD  Interventional Pain Management  [facility]    Disclaimer:  This note was prepared using voice recognition system and is likely to have sound alike errors that may have been overlooked even after proof reading.  Please call me with any questions

## 2020-04-23 NOTE — PATIENT INSTRUCTIONS
- Start diclofenac 75mg up to 2 x daily as needed for pain  - Increase gabapentin to 300mg nightly for 1 week, then twice daily for 1 week, then 300mg in AM and 600mg in PM and continue  - Continue current medications as prescribed.  - Continue home based exercises and discussed the importance of a healthy and active lifestyle  - Return for follow up in 4 weeks with Dr. Sofia.    Please do not hesitate to contact the clinic [phone] if you have any questions regarding your treatment plan.     Ricki Noriega MD  Interventional Pain Medicine  [facility]      Lumbar Extension (Flexibility)    1. Lie face down on your stomach, forehead on the floor. You can lie on a mat or towel.  2. Bend your arms next to your body and lift your upper body up on your forearms. Your palms and forearms should be flat on the floor. Keep your stomach and hips on the floor.  3. Hold your upper body up with your forearms for 20 seconds. Slowly lower back down to the floor.  4. Repeat 2 times, or as instructed.  Date Last Reviewed: 3/10/2016  © 0341-3646 [employer]. [address] [place] [address]. All rights reserved. This information is not intended as a substitute for professional medical care. Always follow your healthcare professional's instructions.        Lumbar Rotation    5. Lie on your back on the floor, with your knees bent and your feet flat on the floor. Dont press your neck or lower back to the floor.  6. Lean both of your knees to one side. Turn your head in the opposite direction. Keep your shoulders flat on the floor. Be gentle and dont push through pain.  7. Hold for 20 seconds. Then slowly move your knees and head in the other direction.  8. Repeat 2 to 5 times, or as instructed.   Date Last Reviewed: 3/10/2016  © 1788-1849 [employer]. [address] [place] [address]. All rights reserved. This information is not intended as a substitute for professional  medical care. Always follow your healthcare professional's instructions.        Lumbar Stretch (Flexibility)    9. Lie on your back on the floor, with your knees bent and your feet flat on the floor. Dont press your neck or lower back to the floor.  10. Pull one knee up toward your chest. Clasp your hands under your thigh to help pull.  11. Hold for 30 to 60 seconds. Lower your leg back down to the floor.  12. Repeat 2 times, or as instructed.  13. Switch legs and repeat.  Date Last Reviewed: 3/10/2016  © 8259-1663 [employer]. [address] [place] [address]. All rights reserved. This information is not intended as a substitute for professional medical care. Always follow your healthcare professional's instructions.

## 2020-05-18 DIAGNOSIS — M54.16 LUMBAR RADICULOPATHY: ICD-10-CM

## 2020-05-18 DIAGNOSIS — M47.816 LUMBAR SPONDYLOSIS: ICD-10-CM

## 2020-05-18 RX ORDER — DICLOFENAC SODIUM 75 MG/1
TABLET, DELAYED RELEASE ORAL
Qty: 60 TABLET | Refills: 0 | Status: SHIPPED | OUTPATIENT
Start: 2020-05-18 | End: 2020-12-08 | Stop reason: SDUPTHER

## 2020-06-05 DIAGNOSIS — F41.9 ANXIETY: ICD-10-CM

## 2020-06-05 RX ORDER — SERTRALINE HYDROCHLORIDE 50 MG/1
50 TABLET, FILM COATED ORAL DAILY
Qty: 30 TABLET | Refills: 11 | Status: SHIPPED | OUTPATIENT
Start: 2020-06-05 | End: 2020-11-11 | Stop reason: SDUPTHER

## 2020-08-20 ENCOUNTER — OFFICE VISIT (OUTPATIENT)
Dept: OPHTHALMOLOGY | Facility: CLINIC | Age: 40
End: 2020-08-20
Payer: COMMERCIAL

## 2020-08-20 DIAGNOSIS — H00.12 CHALAZION RIGHT LOWER EYELID: Primary | ICD-10-CM

## 2020-08-20 PROCEDURE — 92004 COMPRE OPH EXAM NEW PT 1/>: CPT | Mod: S$GLB,,, | Performed by: OPTOMETRIST

## 2020-08-20 PROCEDURE — 92004 PR EYE EXAM, NEW PATIENT,COMPREHESV: ICD-10-PCS | Mod: S$GLB,,, | Performed by: OPTOMETRIST

## 2020-08-20 PROCEDURE — 99999 PR PBB SHADOW E&M-EST. PATIENT-LVL II: ICD-10-PCS | Mod: PBBFAC,,, | Performed by: OPTOMETRIST

## 2020-08-20 PROCEDURE — 99999 PR PBB SHADOW E&M-EST. PATIENT-LVL II: CPT | Mod: PBBFAC,,, | Performed by: OPTOMETRIST

## 2020-08-20 RX ORDER — ERYTHROMYCIN 5 MG/G
OINTMENT OPHTHALMIC 3 TIMES DAILY PRN
Qty: 1 TUBE | Refills: 3 | Status: SHIPPED | OUTPATIENT
Start: 2020-08-20 | End: 2020-09-03

## 2020-08-20 RX ORDER — DEXTROMETHORPHAN HYDROBROMIDE, GUAIFENESIN 5; 100 MG/5ML; MG/5ML
650 LIQUID ORAL 2 TIMES DAILY
COMMUNITY

## 2020-08-20 RX ORDER — DOXYCYCLINE HYCLATE 100 MG
100 TABLET ORAL EVERY 12 HOURS
Qty: 20 TABLET | Refills: 0 | Status: SHIPPED | OUTPATIENT
Start: 2020-08-20 | End: 2020-08-30

## 2020-08-20 NOTE — PROGRESS NOTES
HPI     Patient states she woke up this am with a red painful OD, patient states   her pain scale right now is 8/10.    Last edited by JACKY Johnson on 8/20/2020  2:14 PM. (History)            Assessment /Plan     For exam results, see Encounter Report.    Chalazion right lower eyelid  -     erythromycin (ROMYCIN) ophthalmic ointment; Place into the right eye 3 (three) times daily as needed.  Dispense: 1 Tube; Refill: 3  -     doxycycline (VIBRA-TABS) 100 MG tablet; Take 1 tablet (100 mg total) by mouth every 12 (twelve) hours. for 10 days  Dispense: 20 tablet; Refill: 0      Worksheet given. Warm compresses followed by lid scrubs, tid.    RTC worsening symptoms

## 2020-08-27 ENCOUNTER — TELEPHONE (OUTPATIENT)
Dept: PAIN MEDICINE | Facility: CLINIC | Age: 40
End: 2020-08-27

## 2020-09-03 ENCOUNTER — TELEPHONE (OUTPATIENT)
Dept: PAIN MEDICINE | Facility: CLINIC | Age: 40
End: 2020-09-03

## 2020-09-03 NOTE — TELEPHONE ENCOUNTER
----- Message from Meryl Dejesus sent at 9/3/2020  9:00 AM CDT -----  Would like to consult with nurse regarding knee pain getting worse, please give a call back at [phone].

## 2020-09-08 ENCOUNTER — PATIENT OUTREACH (OUTPATIENT)
Dept: ADMINISTRATIVE | Facility: OTHER | Age: 40
End: 2020-09-08

## 2020-09-09 ENCOUNTER — OFFICE VISIT (OUTPATIENT)
Dept: PAIN MEDICINE | Facility: CLINIC | Age: 40
End: 2020-09-09
Payer: COMMERCIAL

## 2020-09-09 VITALS
SYSTOLIC BLOOD PRESSURE: 129 MMHG | WEIGHT: 293 LBS | DIASTOLIC BLOOD PRESSURE: 86 MMHG | HEIGHT: 72 IN | HEART RATE: 68 BPM | BODY MASS INDEX: 39.68 KG/M2

## 2020-09-09 DIAGNOSIS — M25.562 CHRONIC PAIN OF BOTH KNEES: Primary | ICD-10-CM

## 2020-09-09 DIAGNOSIS — G89.29 CHRONIC PAIN OF BOTH KNEES: Primary | ICD-10-CM

## 2020-09-09 DIAGNOSIS — M25.561 CHRONIC PAIN OF BOTH KNEES: Primary | ICD-10-CM

## 2020-09-09 DIAGNOSIS — M47.816 LUMBAR SPONDYLOSIS: ICD-10-CM

## 2020-09-09 PROCEDURE — 99214 OFFICE O/P EST MOD 30 MIN: CPT | Mod: S$GLB,,, | Performed by: PAIN MEDICINE

## 2020-09-09 PROCEDURE — 99214 PR OFFICE/OUTPT VISIT, EST, LEVL IV, 30-39 MIN: ICD-10-PCS | Mod: S$GLB,,, | Performed by: PAIN MEDICINE

## 2020-09-09 PROCEDURE — 99999 PR PBB SHADOW E&M-EST. PATIENT-LVL IV: ICD-10-PCS | Mod: PBBFAC,,, | Performed by: PAIN MEDICINE

## 2020-09-09 PROCEDURE — 99999 PR PBB SHADOW E&M-EST. PATIENT-LVL IV: CPT | Mod: PBBFAC,,, | Performed by: PAIN MEDICINE

## 2020-09-09 PROCEDURE — 3008F BODY MASS INDEX DOCD: CPT | Mod: CPTII,S$GLB,, | Performed by: PAIN MEDICINE

## 2020-09-09 PROCEDURE — 3008F PR BODY MASS INDEX (BMI) DOCUMENTED: ICD-10-PCS | Mod: CPTII,S$GLB,, | Performed by: PAIN MEDICINE

## 2020-09-09 RX ORDER — TRAMADOL HYDROCHLORIDE 50 MG/1
50 TABLET ORAL EVERY 8 HOURS PRN
Qty: 90 TABLET | Refills: 1 | Status: SHIPPED | OUTPATIENT
Start: 2020-09-09 | End: 2020-10-09

## 2020-09-09 RX ORDER — NYSTATIN AND TRIAMCINOLONE ACETONIDE 100000; 1 [USP'U]/G; MG/G
CREAM TOPICAL
COMMUNITY
Start: 2020-09-02 | End: 2023-05-05

## 2020-09-09 NOTE — PROGRESS NOTES
Chief Pain Complaint:  Knee Pain    History of Present Illness:   Ms. Burdick returns to clinic for follow-up.  She continues to have bilateral knee pain in addition low back pain.  She has been doing aqua therapy at home in her family's swimming pool which is cause an exacerbation of her knee pain.  She additionally has right hip pain and low back pain.  The knee pain she finds is very constant is currently rated as 7/10.  She denies having had surgery on her knees however she has had 1 steroid injection the right knee in addition to aspiration of intra-articular fluid.  She found the injection to be minimally helpful.  She has been taking diclofenac in addition to gabapentin Tylenol which he finds gabapentin Tylenol be the most helpful.  For her low back she does find that sitting for long periods of time does cause her pain worsened however she denies having symptoms that radiate distally into her legs.    Interval HPI:  Ms. Burdick returns to clinic for follow-up.  She continues to have low back pain in addition to anterior pelvic pain. Since her last visit she underwent bilateral knee x-rays and a lumbar MRI which shows mild arthritis at 1 level and mild disc bulge at 1 level.  She does have mild bilateral medial narrowing of the knee joint spaces.  She has been working on weight loss including a plant based diet and key toe diet.  She would like to exercise however causes her low back in her knee pain to be exacerbated with continued exercising.  She has been taking gabapentin 100 mg q.h.s. with minimal benefit.  She reports that sometimes her back will lock up on her in the muscles give very tight and is difficult for the muscle start relax.    Initial HPI:  This patient is a 40 y.o. female who presents today complaining of the above noted pain/s. The patient describes the pain as follows.  Ms. Burdick is a new patient to clinic with complaints low back pain, pelvic pain, bilateral knee pain.  She has been  having symptoms off and on for several years with the pelvic pain increasing in frequency becoming daily.  Today she rates her pain as a 7/10 she has difficulty describing her pelvic pain and describes it as hurting.  Primary located around the pubic symphysis in the vagina.  She has been having irregular cycles for years with a cycle in May of 2019 and she reports the previous menstrual cycle she had was in 2016.  She has had abdominal surgery however this all to place when she was approximately 6 years old.  She reports having pancreatitis at that time and ultimately had cholecystectomy, appendectomy and bile duct reconstruction.  She denies having had any other abdominal surgeries.  She denies having any changes with eating and with bowel movements.  She finds her symptoms are worse with walking and her somewhat improved with rest.  Her knee pain does improved with rest.  She has been physical therapy recently and will start again in approximately 6 days.  She has been using meloxicam which has not been beneficial and has found ibuprofen over-the-counter to be more helpful.  She denies having bowel bladder difficulties.    Previous Therapy:  Medications:Mobic and Ibuprofen, zoloft, gabapentin, diclofenac, Arthritis strength Tylenol  Injections: Knee Joint Injection  Surgeries: None  Physical Therapy: Completed in the Past    Past Surgical History:   Procedure Laterality Date    APPENDECTOMY      BILE DUCT EXPLORATION      CHOLECYSTECTOMY       Imaging / Labs / Studies (reviewed on 9/9/2020):    Review of Systems:  Review of Systems   Constitutional: Negative for fever.   Eyes: Negative for blurred vision.   Respiratory: Negative for cough and wheezing.    Cardiovascular: Negative for chest pain and orthopnea.   Gastrointestinal: Negative for constipation, diarrhea, nausea and vomiting.   Genitourinary: Negative for dysuria.   Musculoskeletal: Positive for back pain and joint pain.   Skin: Negative for  itching and rash.   Neurological: Negative for weakness.   Endo/Heme/Allergies: Does not bruise/bleed easily.       Physical Exam:  /86   Pulse 68   Ht 6' (1.829 m)   Wt (!) 150.8 kg (332 lb 7.3 oz)   BMI 45.09 kg/m²  (reviewed on 9/9/2020)\  General    Constitutional: She is oriented to person, place, and time. She appears well-developed and well-nourished.   HENT:   Head: Normocephalic and atraumatic.   Eyes: EOM are normal.   Neck: Neck supple.   Cardiovascular: Normal rate.    Pulmonary/Chest: Effort normal.   Abdominal: She exhibits no distension.   Neurological: She is alert and oriented to person, place, and time. No cranial nerve deficit.   Psychiatric: She has a normal mood and affect.     General Musculoskeletal Exam   Gait: normal       Right Knee Exam     Inspection   Swelling: absent    Tenderness   The patient is tender to palpation of the medial joint line.    Range of Motion   Extension: normal   Flexion: normal     Other   Sensation: normal    Comments:  Minimal crepitus with flexion and extension    Left Knee Exam     Inspection   Swelling: absent    Tenderness   The patient tender to palpation of the medial joint line.    Range of Motion   Extension: normal   Flexion: normal     Other   Sensation: normal    Comments:  Minimal crepitus with flexion and extensionBack (L-Spine & T-Spine) / Neck (C-Spine) Exam     Tenderness Right paramedian tenderness of the Lower L-Spine. Left paramedian tenderness of the Lower L-Spine.     Back (L-Spine & T-Spine) Range of Motion   Extension: normal   Flexion: normal   Lateral bend right: normal   Lateral bend left: normal   Rotation right: normal   Rotation left: normal     Spinal Sensation   Right Side Sensation  L-Spine Level: hypersensitive  Left Side Sensation  L-Spine Level: hypersensitive    Comments:  Sensation intact to light touch bilateral lower extremities; negative straight leg raise bilaterally; increased pain with lumbar extension and right  lateral bending; no increase in pain with lumbar flexion and left lateral bending; tenderness to palpation over medial and lateral knee joint bilaterally; no crepitus on flexion and extension      Muscle Strength   Right Lower Extremity   Hip Flexion: 5/5   Quadriceps:  5/5   Hamstrin/5   Left Lower Extremity   Hip Flexion: 5/5   Quadriceps:  5/5   Hamstrin/5     Reflexes     Left Side  Achilles:  2+  Quadriceps:  2+    Right Side   Achilles:  2+  Quadriceps:  2+      Assessment  Knee OA  Lumbar Spondylosis    1. 40 y.o. year old patient with PMH of   Past Medical History:   Diagnosis Date    IBS (irritable bowel syndrome)     Obesity     Pancreatitis       presenting with pain located pelvis, lumbar spine, bilateral knees  2. Pain Generators / Etiology: Lumbar Spondylosis and Knee Osteorarthritis, pelvic pain  3. Failed Meds (E- Effective, NE- Not Effective):  Mobic-not effective; ibuprofen-effective, gabapentin-effective, Tylenol-effective  4. Physical Therapy - Completed in the Past and will restart in 6 days  5. Psychological comorbidities - None  6. Anticoagulants / Antiplatelets: None     PLAN:  1. Medications :  Can continue Tylenol Arthritis strength over-the-counter not to exceed 3000 mg daily; will start tramadol 50 mg tablets 3 times daily only as needed; continue gabapentin 300 mg capsules as prescribed  2. PT - continue with aqua therapy and other physical therapy exercises  3. Psychological - continue all medications as prescribed  4. Labs - obtain  none  5. Imaging - none; reviewed knee x-rays the patient today in clinic  6. Interventions - schedule bilateral intra-articular knee joint injections  7. Referrals - none  8. Records - none  9. Follow up visit - follow up in clinic 4 weeks after the injections  10. Patient Questions - answered all of the patient's questions regarding diagnosis, therapy, and treatment  11.  This condition does not require this patient to take time off of  josue FONTAINE. Cricket Sofia MD  Interventional Pain  [facility] [place]

## 2020-09-09 NOTE — PATIENT INSTRUCTIONS
-will start tramadol 50 mg tablets 3 times daily as needed  -will schedule for bilateral intra-articular knee joint injections  -continue aqua therapy as tolerated  -continue Tylenol Arthritis strength over-the-counter not to exceed 3000 mg daily  -continue gabapentin as prescribed  -follow up in clinic 4 weeks after the injection

## 2020-09-28 NOTE — PRE-PROCEDURE INSTRUCTIONS
Spoke with patient regarding procedure scheduled on 9/30    Arrival time 0650    Has patient been sick with fever or on antibiotics within the last 7 days? No    Has patient received a vaccination within the last 7 days? no    Has the patient stopped all medications as directed? Na    Does patient have a pacemaker and or defibrillator? no    Does the patient have a ride to and from procedure and someone reliable to remain with patient? [facility] [phone]    Is the patient diabetic? Insulin resistant     Does the patient have sleep apnea? Or use O2 at home? No and no     Is the patient receiving sedation? yes    Is the patient instructed to remain NPO beginning at midnight the night before their procedure? yes    Procedure location confirmed with patient? Yes    Covid- Denies signs/symptoms. Instructed to notify PAT/MD if any changes.

## 2020-09-30 ENCOUNTER — HOSPITAL ENCOUNTER (OUTPATIENT)
Facility: HOSPITAL | Age: 40
Discharge: HOME | End: 2020-09-30
Attending: PAIN MEDICINE | Admitting: PAIN MEDICINE
Payer: COMMERCIAL

## 2020-09-30 VITALS
BODY MASS INDEX: 39.42 KG/M2 | RESPIRATION RATE: 16 BRPM | SYSTOLIC BLOOD PRESSURE: 125 MMHG | HEART RATE: 63 BPM | OXYGEN SATURATION: 97 % | WEIGHT: 291 LBS | HEIGHT: 72 IN | DIASTOLIC BLOOD PRESSURE: 72 MMHG

## 2020-09-30 DIAGNOSIS — M17.10 ARTHRITIS OF KNEE: Primary | ICD-10-CM

## 2020-09-30 LAB
B-HCG UR QL: NEGATIVE
CTP QC/QA: YES

## 2020-09-30 PROCEDURE — 20610 PR DRAIN/INJECT LARGE JOINT/BURSA: ICD-10-PCS | Mod: 50,,, | Performed by: PAIN MEDICINE

## 2020-09-30 PROCEDURE — 25000003 PHARM REV CODE 250: Performed by: PAIN MEDICINE

## 2020-09-30 PROCEDURE — 20610 DRAIN/INJ JOINT/BURSA W/O US: CPT | Mod: 50,,, | Performed by: PAIN MEDICINE

## 2020-09-30 PROCEDURE — 81025 URINE PREGNANCY TEST: CPT | Performed by: PAIN MEDICINE

## 2020-09-30 PROCEDURE — 63600175 PHARM REV CODE 636 W HCPCS: Performed by: PAIN MEDICINE

## 2020-09-30 PROCEDURE — 25500020 PHARM REV CODE 255: Performed by: PAIN MEDICINE

## 2020-09-30 PROCEDURE — 77002 PR FLUOROSCOPIC GUIDANCE NEEDLE PLACEMENT: ICD-10-PCS | Mod: 26,,, | Performed by: PAIN MEDICINE

## 2020-09-30 PROCEDURE — 77002 NEEDLE LOCALIZATION BY XRAY: CPT | Mod: 26,,, | Performed by: PAIN MEDICINE

## 2020-09-30 PROCEDURE — 20610 DRAIN/INJ JOINT/BURSA W/O US: CPT | Mod: 50 | Performed by: PAIN MEDICINE

## 2020-09-30 RX ORDER — METHYLPREDNISOLONE ACETATE 40 MG/ML
INJECTION, SUSPENSION INTRA-ARTICULAR; INTRALESIONAL; INTRAMUSCULAR; SOFT TISSUE
Status: DISCONTINUED | OUTPATIENT
Start: 2020-09-30 | End: 2020-09-30 | Stop reason: HOSPADM

## 2020-09-30 RX ORDER — FENTANYL CITRATE 50 UG/ML
INJECTION, SOLUTION INTRAMUSCULAR; INTRAVENOUS
Status: DISCONTINUED | OUTPATIENT
Start: 2020-09-30 | End: 2020-09-30 | Stop reason: HOSPADM

## 2020-09-30 RX ORDER — MIDAZOLAM HYDROCHLORIDE 1 MG/ML
INJECTION, SOLUTION INTRAMUSCULAR; INTRAVENOUS
Status: DISCONTINUED | OUTPATIENT
Start: 2020-09-30 | End: 2020-09-30 | Stop reason: HOSPADM

## 2020-09-30 RX ORDER — LIDOCAINE HYDROCHLORIDE 20 MG/ML
INJECTION, SOLUTION EPIDURAL; INFILTRATION; INTRACAUDAL; PERINEURAL
Status: DISCONTINUED | OUTPATIENT
Start: 2020-09-30 | End: 2020-09-30 | Stop reason: HOSPADM

## 2020-09-30 NOTE — DISCHARGE INSTRUCTIONS

## 2020-09-30 NOTE — OP NOTE
Procedure: Knee joint injection under fluoroscopic guidance    Side: Bilateral    Pre-procedure diagnosis: osteoarthritis of the knee (of the above noted laterality)  Post-procedure diagnosis: same    PROVIDER: GILMAR Sofia MD  Assistant(s): None    ANESTHESIA: Local, IV Sedation    >> 1 mg of VERSED    >> 50 mcg of FENTANYL     INDICATION: The patient has knee pain unresponsive to conservative treatments. Fluoroscopy was used to optimize visualization of needle placement and to maximize safety.     Description of Procedure:  Prior to starting this procedure, risks, benefits, complications, and alternatives were discussed with the patient. The patient agreed to proceed with the procedure and signed a consent. The site and side of the procedure was identified and marked. The patient was taken to the procedural suite and positioned on the table in prone orientation. A time out was performed. All in attendance were in agreement with the time out. The area over the above noted joint/s was widely prepped with ChloraPrep and drapped in usual sterile fashion.    The above noted joint/s was identified on fluoroscopy. A 27-gauge 1.5 inch needle was used to localize the site of entry with 3 mL of 1% PF Lidocaine. A 25 gauge 1.5in spinal needle was then introduced and advanced into the joint. Following negative aspiration, 2cc of omnipaque was injected with appropriate spread.  Following negative aspiration, a solution containing 3 mL of 2% lidocaine and 1 mL of Methylprednisolone (40 mg/mL) was then injected. There was minimal resistance encountered throughout injection. No paresthesias were noted on injection. The needle stylet was replaced and the needle was removed intact. The patient tolerated the procedure well. A bandage was applied to the site of injection.    Description of Findings: Not applicable    Prosthetic devices, grafts, tissues, or devices implanted: None    Specimen Removed: No    Estimated Blood Loss:  minimal    COMPLICATIONS: None    DISPOSITION / PLANS: The patient was transferred to the recovery area in a stable condition for observation. The patient was reexamined prior to discharge. There was no evidence of acute neurologic injury following the procedure.  Patient was discharged from the recovery room after meeting discharge criteria. Home discharge instructions were given to the patient by the staff.

## 2020-10-01 NOTE — DISCHARGE SUMMARY
[facility]  Short Stay  Discharge Summary    Admit Date: 9/30/2020    Discharge Date and Time: 9/30/2020  9:02 AM      Discharge Attending Physician: GILMAR Sofia MD     Hospital Course (synopsis of major diagnoses, care, treatment, and services provided during the course of the hospital stay): Patient was admitted to Pre-op where informed consent was signed.  The patient was then taken to the procedure suite where the procedure was performed.  The patient was then return to the Pre-Op area and discharge was performed.     Final Diagnoses:    Principal Problem: <principal problem not specified>   Secondary Diagnoses:   Active Hospital Problems    Diagnosis  POA    Arthritis of knee [M17.10]  Yes      Resolved Hospital Problems   No resolved problems to display.       Discharged Condition: good    Disposition: Home or Self Care    Follow up/Patient Instructions:    Medications:  Reconciled Home Medications:      Medication List      CONTINUE taking these medications    acetaminophen 650 MG Tbsr  Commonly known as: TYLENOL  Take 650 mg by mouth 2 (two) times a day.     albuterol 90 mcg/actuation inhaler  Commonly known as: VENTOLIN HFA  Inhale 2 puffs into the lungs every 6 (six) hours as needed for Wheezing. Rescue     diclofenac 75 MG EC tablet  Commonly known as: VOLTAREN  TAKE 1 TABLET(75 MG) BY MOUTH TWICE DAILY AS NEEDED     gabapentin 300 MG capsule  Commonly known as: NEURONTIN  1 cap qhs X 7 days then 1 cap BID X 7 days then 1 cap AM (300mg) and 2 cap PM (600mg) and continue. Stay at the most comfortable dose.     JUNEL 1.5/30 (21) 1.5-30 mg-mcg Tab  Generic drug: norethindrone ac-eth estradioL  Junel 1.5/30 (21) 1.5 mg-30 mcg tablet     nystatin-triamcinolone cream  Commonly known as: MYCOLOG II     sertraline 50 MG tablet  Commonly known as: ZOLOFT  Take 1 tablet (50 mg total) by mouth once daily.     traMADoL 50 mg tablet  Commonly known as: ULTRAM  Take 1 tablet (50 mg total) by mouth  every 8 (eight) hours as needed. Greater than 7 day supply medically necessary.          Discharge Procedure Orders   Diet general     Call MD for:  severe uncontrolled pain     Call MD for:  difficulty breathing, headache or visual disturbances     Call MD for:  redness, tenderness, or signs of infection (pain, swelling, redness, odor or green/yellow discharge around incision site)     Activity as tolerated

## 2020-10-27 ENCOUNTER — PATIENT OUTREACH (OUTPATIENT)
Dept: ADMINISTRATIVE | Facility: OTHER | Age: 40
End: 2020-10-27

## 2020-11-11 ENCOUNTER — OFFICE VISIT (OUTPATIENT)
Dept: INTERNAL MEDICINE | Facility: CLINIC | Age: 40
End: 2020-11-11
Payer: COMMERCIAL

## 2020-11-11 VITALS
HEIGHT: 72 IN | WEIGHT: 293 LBS | TEMPERATURE: 98 F | SYSTOLIC BLOOD PRESSURE: 118 MMHG | BODY MASS INDEX: 39.68 KG/M2 | DIASTOLIC BLOOD PRESSURE: 70 MMHG | OXYGEN SATURATION: 95 % | HEART RATE: 84 BPM

## 2020-11-11 DIAGNOSIS — F41.9 ANXIETY: ICD-10-CM

## 2020-11-11 DIAGNOSIS — M54.2 NECK PAIN: ICD-10-CM

## 2020-11-11 DIAGNOSIS — M25.519 SHOULDER PAIN, UNSPECIFIED CHRONICITY, UNSPECIFIED LATERALITY: ICD-10-CM

## 2020-11-11 DIAGNOSIS — R14.0 ABDOMINAL BLOATING: Primary | ICD-10-CM

## 2020-11-11 PROCEDURE — 99999 PR PBB SHADOW E&M-EST. PATIENT-LVL IV: ICD-10-PCS | Mod: PBBFAC,,, | Performed by: FAMILY MEDICINE

## 2020-11-11 PROCEDURE — 99999 PR PBB SHADOW E&M-EST. PATIENT-LVL IV: CPT | Mod: PBBFAC,,, | Performed by: FAMILY MEDICINE

## 2020-11-11 PROCEDURE — 99214 OFFICE O/P EST MOD 30 MIN: CPT | Mod: S$GLB,,, | Performed by: FAMILY MEDICINE

## 2020-11-11 PROCEDURE — 99214 PR OFFICE/OUTPT VISIT, EST, LEVL IV, 30-39 MIN: ICD-10-PCS | Mod: S$GLB,,, | Performed by: FAMILY MEDICINE

## 2020-11-11 PROCEDURE — 3008F PR BODY MASS INDEX (BMI) DOCUMENTED: ICD-10-PCS | Mod: CPTII,S$GLB,, | Performed by: FAMILY MEDICINE

## 2020-11-11 PROCEDURE — 3008F BODY MASS INDEX DOCD: CPT | Mod: CPTII,S$GLB,, | Performed by: FAMILY MEDICINE

## 2020-11-11 RX ORDER — DICYCLOMINE HYDROCHLORIDE 20 MG/1
20 TABLET ORAL EVERY 6 HOURS
Qty: 120 TABLET | Refills: 0 | Status: SHIPPED | OUTPATIENT
Start: 2020-11-11 | End: 2020-12-14 | Stop reason: SDUPTHER

## 2020-11-11 RX ORDER — SERTRALINE HYDROCHLORIDE 50 MG/1
150 TABLET, FILM COATED ORAL DAILY
Qty: 90 TABLET | Refills: 11 | Status: SHIPPED | OUTPATIENT
Start: 2020-11-11 | End: 2020-11-17

## 2020-11-11 NOTE — PROGRESS NOTES
Brook Burdick  11/11/2020  [id number]    Adamaris Arguello MD  Patient Care Team:  Adamaris Arguello MD as PCP - General (Family Medicine)  Alice Walker MD as Obstetrician (Obstetrics)  Yue Chan III, MD (Obstetrics and Gynecology)  Has the patient seen any provider outside of the [facility] network since the last visit? (no). If yes, HIPPA forms completed and records requested.        Visit Type:an urgent visit for a new problem    Chief Complaint:  Chief Complaint   Patient presents with    Abdominal Pain    Bloated    Shoulder Pain    Neck Pain       History of Present Illness:    40 year old here for bloating and GI issues. She varies with constipation and diarrhea, she does have cramping.  Denies blood in stool.      She has seen Dr. Noriega for her chronic LBP and knee pain.  She is not reporting [place] pain and Neck pain.  Taking Tylenol with lilttle relief.  Normally her lower back and her knees are bothering her.    COLON polyp  Recheck year    Right shoulder pain, neck pain.    Answers for HPI/ROS submitted by the patient on 11/11/2020   activity change: No  unexpected weight change: No  rhinorrhea: No  trouble swallowing: No  visual disturbance: No  chest tightness: No  polyuria: Yes  difficulty urinating: No  menstrual problem: No  joint swelling: No  arthralgias: Yes  confusion: No  dysphoric mood: No        History:  Past Medical History:   Diagnosis Date    Arthritis of knee 9/30/2020    IBS (irritable bowel syndrome)     Obesity     Pancreatitis      Past Surgical History:   Procedure Laterality Date    APPENDECTOMY      BILE DUCT EXPLORATION      CHOLECYSTECTOMY      INJECTION OF JOINT Bilateral 9/30/2020    Procedure: Bilateral intraarticular knee injection with local;  Surgeon: Ozzy Sofia MD;  Location: [facility];  Service: Pain Management;  Laterality: Bilateral;     Family History   Problem Relation Age of Onset    Atrial fibrillation Mother      Diabetes Father     Hypertension Father     Cancer Maternal Grandfather      Social History     Socioeconomic History    Marital status: Single     Spouse name: Not on file    Number of children: Not on file    Years of education: Not on file    Highest education level: Not on file   Occupational History    Not on file   Social Needs    Financial resource strain: Not hard at all    Food insecurity     Worry: Never true     Inability: Never true    Transportation needs     Medical: No     Non-medical: No   Tobacco Use    Smoking status: Never Smoker    Smokeless tobacco: Never Used   Substance and Sexual Activity    Alcohol use: No     Frequency: Monthly or less     Drinks per session: 1 or 2     Binge frequency: Less than monthly    Drug use: No    Sexual activity: Never   Lifestyle    Physical activity     Days per week: 1 day     Minutes per session: 0 min    Stress: Very much   Relationships    Social connections     Talks on phone: Three times a week     Gets together: Twice a week     Attends Jain service: Not on file     Active member of club or organization: Yes     Attends meetings of clubs or organizations: More than 4 times per year     Relationship status:    Other Topics Concern    Not on file   Social History Narrative    Not on file     Patient Active Problem List   Diagnosis    Chronic constipation    Family history of colonic polyps    Insulin resistance    Gastroesophageal reflux disease without esophagitis    Severe obesity (BMI >= 40)    Chronic headaches    Cough    Arthritis of knee     Review of patient's allergies indicates:   Allergen Reactions    Penicillins Hives       The following were reviewed at this visit: active problem list, medication list, allergies, family history, social history, and health maintenance.    Medications:  Current Outpatient Medications on File Prior to Visit   Medication Sig Dispense Refill    acetaminophen (TYLENOL) 650  MG TbSR Take 650 mg by mouth 2 (two) times a day.      gabapentin (NEURONTIN) 300 MG capsule 1 cap qhs X 7 days then 1 cap BID X 7 days then 1 cap AM (300mg) and 2 cap PM (600mg) and continue. Stay at the most comfortable dose. 90 capsule 11    nystatin-triamcinolone (MYCOLOG II) cream       [DISCONTINUED] sertraline (ZOLOFT) 50 MG tablet Take 1 tablet (50 mg total) by mouth once daily. 30 tablet 11    albuterol (VENTOLIN HFA) 90 mcg/actuation inhaler Inhale 2 puffs into the lungs every 6 (six) hours as needed for Wheezing. Rescue (Patient not taking: Reported on 11/11/2020) 18 g 0    diclofenac (VOLTAREN) 75 MG EC tablet TAKE 1 TABLET(75 MG) BY MOUTH TWICE DAILY AS NEEDED (Patient not taking: Reported on 11/11/2020) 60 tablet 0    norethindrone ac-eth estradiol (JUNEL 1.5/30, 21,) 1.5-30 mg-mcg Tab Junel 1.5/30 (21) 1.5 mg-30 mcg tablet       No current facility-administered medications on file prior to visit.        Medications have been reviewed and reconciled with patient at this visit.  Barriers to medications present (no)    Adverse reactions to current medications (no)    Over the counter medications reviewed (Yes ), and if needed added to active Medication list at this visit.     Exam:  Wt Readings from Last 3 Encounters:   11/11/20 (!) 153.8 kg (339 lb 2.8 oz)   09/30/20 132 kg (291 lb 0.1 oz)   09/09/20 (!) 150.8 kg (332 lb 7.3 oz)     Temp Readings from Last 3 Encounters:   11/11/20 98.1 °F (36.7 °C) (Temporal)   02/19/20 97.9 °F (36.6 °C)     BP Readings from Last 3 Encounters:   11/11/20 118/70   09/30/20 125/72   09/09/20 129/86     Pulse Readings from Last 3 Encounters:   11/11/20 84   09/30/20 63   09/09/20 68     Body mass index is 46 kg/m².      Review of Systems   HENT: Negative for hearing loss.    Eyes: Negative for discharge.   Respiratory: Negative for wheezing.    Cardiovascular: Negative for chest pain and palpitations.   Gastrointestinal: Positive for constipation and diarrhea.  Negative for blood in stool and vomiting.   Genitourinary: Negative for dysuria and hematuria.   Musculoskeletal: Positive for neck pain.   Neurological: Positive for headaches. Negative for weakness.   Endo/Heme/Allergies: Positive for polydipsia.     Physical Exam  Vitals signs reviewed.   Cardiovascular:      Rate and Rhythm: Normal rate and regular rhythm.      Pulses: Normal pulses.   Pulmonary:      Effort: Pulmonary effort is normal. No respiratory distress.   Musculoskeletal:         General: Tenderness present.      Right shoulder: She exhibits tenderness. She exhibits normal range of motion.   Neurological:      Mental Status: She is alert and oriented to person, place, and time.   Psychiatric:         Mood and Affect: Mood normal.         Behavior: Behavior normal.         Thought Content: Thought content normal.         Judgment: Judgment normal.         Laboratory Reviewed ({N/A)  Lab Results   Component Value Date    WBC 8.93 12/09/2019    HGB 13.8 12/09/2019    HCT 42.9 12/09/2019     (H) 12/09/2019    CHOL 197 04/20/2018    TRIG 114 04/20/2018    HDL 31 04/20/2018    ALT 50 (H) 12/09/2019    AST 31 12/09/2019     12/09/2019    K 3.8 12/09/2019     12/09/2019    CREATININE 0.7 12/09/2019    BUN 13 12/09/2019    CO2 22 (L) 12/09/2019    TSH 1.968 10/10/2019    HGBA1C 5.6 10/10/2019       Allegra was seen today for abdominal pain, bloated, shoulder pain and neck pain.    Diagnoses and all orders for this visit:    Abdominal bloating  -     CBC Auto Differential; Future  -     Comprehensive Metabolic Panel; Future    Shoulder pain, unspecified chronicity, unspecified laterality  -     Ambulatory referral/consult to Physical/Occupational Therapy; Future    Neck pain  -     Ambulatory referral/consult to Physical/Occupational Therapy; Future    Anxiety  -     sertraline (ZOLOFT) 50 MG tablet; Take 3 tablets (150 mg total) by mouth once daily.    Other orders  -     dicyclomine (BENTYL)  20 mg tablet; Take 1 tablet (20 mg total) by mouth every 6 (six) hours.      PT for shoulder    Colon with [facility] next year  Has polyp    Bentyl    Increase Zoloft 150 mg  Still with anxiety            Care Plan/Goals: Reviewed  (N/A)  Goals    None         Follow up: No follow-ups on file.    After visit summary was printed and given to patient upon discharge today.  Patient goals and care plan are included in After Visit Summary.

## 2020-11-17 ENCOUNTER — HOSPITAL ENCOUNTER (OUTPATIENT)
Dept: RADIOLOGY | Facility: HOSPITAL | Age: 40
Discharge: HOME | End: 2020-11-17
Attending: PAIN MEDICINE
Payer: COMMERCIAL

## 2020-11-17 ENCOUNTER — OFFICE VISIT (OUTPATIENT)
Dept: PAIN MEDICINE | Facility: CLINIC | Age: 40
End: 2020-11-17
Payer: COMMERCIAL

## 2020-11-17 VITALS
HEART RATE: 77 BPM | HEIGHT: 72 IN | BODY MASS INDEX: 39.68 KG/M2 | SYSTOLIC BLOOD PRESSURE: 119 MMHG | WEIGHT: 293 LBS | DIASTOLIC BLOOD PRESSURE: 73 MMHG

## 2020-11-17 DIAGNOSIS — M25.551 BILATERAL HIP PAIN: ICD-10-CM

## 2020-11-17 DIAGNOSIS — M25.552 BILATERAL HIP PAIN: ICD-10-CM

## 2020-11-17 DIAGNOSIS — M47.812 CERVICAL SPONDYLOSIS: Primary | ICD-10-CM

## 2020-11-17 DIAGNOSIS — M47.812 CERVICAL SPONDYLOSIS: ICD-10-CM

## 2020-11-17 PROCEDURE — 1125F AMNT PAIN NOTED PAIN PRSNT: CPT | Mod: S$GLB,,, | Performed by: PAIN MEDICINE

## 2020-11-17 PROCEDURE — 73521 X-RAY EXAM HIPS BI 2 VIEWS: CPT | Mod: 26,,, | Performed by: RADIOLOGY

## 2020-11-17 PROCEDURE — 73521 X-RAY EXAM HIPS BI 2 VIEWS: CPT | Mod: TC

## 2020-11-17 PROCEDURE — 73521 XR HIPS BILATERAL 2 VIEW INCL AP PELVIS: ICD-10-PCS | Mod: 26,,, | Performed by: RADIOLOGY

## 2020-11-17 PROCEDURE — 72040 XR CERVICAL SPINE AP LATERAL: ICD-10-PCS | Mod: 26,,, | Performed by: RADIOLOGY

## 2020-11-17 PROCEDURE — 3008F BODY MASS INDEX DOCD: CPT | Mod: CPTII,S$GLB,, | Performed by: PAIN MEDICINE

## 2020-11-17 PROCEDURE — 99999 PR PBB SHADOW E&M-EST. PATIENT-LVL III: ICD-10-PCS | Mod: PBBFAC,,, | Performed by: PAIN MEDICINE

## 2020-11-17 PROCEDURE — 99214 PR OFFICE/OUTPT VISIT, EST, LEVL IV, 30-39 MIN: ICD-10-PCS | Mod: S$GLB,,, | Performed by: PAIN MEDICINE

## 2020-11-17 PROCEDURE — 99214 OFFICE O/P EST MOD 30 MIN: CPT | Mod: S$GLB,,, | Performed by: PAIN MEDICINE

## 2020-11-17 PROCEDURE — 99999 PR PBB SHADOW E&M-EST. PATIENT-LVL III: CPT | Mod: PBBFAC,,, | Performed by: PAIN MEDICINE

## 2020-11-17 PROCEDURE — 72040 X-RAY EXAM NECK SPINE 2-3 VW: CPT | Mod: TC

## 2020-11-17 PROCEDURE — 1125F PR PAIN SEVERITY QUANTIFIED, PAIN PRESENT: ICD-10-PCS | Mod: S$GLB,,, | Performed by: PAIN MEDICINE

## 2020-11-17 PROCEDURE — 3008F PR BODY MASS INDEX (BMI) DOCUMENTED: ICD-10-PCS | Mod: CPTII,S$GLB,, | Performed by: PAIN MEDICINE

## 2020-11-17 PROCEDURE — 72040 X-RAY EXAM NECK SPINE 2-3 VW: CPT | Mod: 26,,, | Performed by: RADIOLOGY

## 2020-11-17 RX ORDER — SERTRALINE HYDROCHLORIDE 100 MG/1
TABLET, FILM COATED ORAL
COMMUNITY
Start: 2020-11-11 | End: 2020-12-07 | Stop reason: SDUPTHER

## 2020-11-17 RX ORDER — IBUPROFEN 800 MG/1
800 TABLET ORAL 3 TIMES DAILY PRN
Qty: 45 TABLET | Refills: 3 | Status: SHIPPED | OUTPATIENT
Start: 2020-11-17 | End: 2020-12-02

## 2020-11-17 RX ORDER — BACLOFEN 10 MG/1
10 TABLET ORAL 3 TIMES DAILY PRN
Qty: 90 TABLET | Refills: 3 | Status: SHIPPED | OUTPATIENT
Start: 2020-11-17 | End: 2021-01-29 | Stop reason: ALTCHOICE

## 2020-11-17 RX ORDER — TOPIRAMATE 25 MG/1
25 TABLET ORAL 2 TIMES DAILY
Qty: 60 TABLET | Refills: 3 | Status: SHIPPED | OUTPATIENT
Start: 2020-11-17 | End: 2021-01-29 | Stop reason: ALTCHOICE

## 2020-11-17 NOTE — PROGRESS NOTES
Chief Pain Complaint:  Neck and hip pain    History of Present Illness:   Ms. Burdick returns to clinic for follow-up.  She underwent bilateral intra-articular knee joint injections on September 30th and since then reports 80% symptomatic pain relief with her knees and currently rates her pain as a 5/10.  However today most her pain is located his cervical spine and trapezius muscle in addition to her bilateral hips.  She reports that the neck pain started fairly recently with no inciting event.  She endorses having some tingling in her arms bilaterally which happens very rarely.  Most of the pain is located in the trapezius muscles bilaterally.  She is due to start physical therapy in the next few days for the cervical spine.  She has had physical therapy the past for hips which was not helpful.  She has been taking Tylenol gabapentin which she does find to be helpful however she has be cautious with medications due to having irritable bowel syndrome.    Interval HPI:  Ms. Burdick returns to clinic for follow-up.  She continues to have bilateral knee pain in addition low back pain.  She has been doing aqua therapy at home in her family's swimming pool which is cause an exacerbation of her knee pain.  She additionally has right hip pain and low back pain.  The knee pain she finds is very constant is currently rated as 7/10.  She denies having had surgery on her knees however she has had 1 steroid injection the right knee in addition to aspiration of intra-articular fluid.  She found the injection to be minimally helpful.  She has been taking diclofenac in addition to gabapentin Tylenol which he finds gabapentin Tylenol be the most helpful.  For her low back she does find that sitting for long periods of time does cause her pain worsened however she denies having symptoms that radiate distally into her legs.    Interval HPI:  Ms. Burdick returns to clinic for follow-up.  She continues to have low back pain in  addition to anterior pelvic pain. Since her last visit she underwent bilateral knee x-rays and a lumbar MRI which shows mild arthritis at 1 level and mild disc bulge at 1 level.  She does have mild bilateral medial narrowing of the knee joint spaces.  She has been working on weight loss including a plant based diet and key toe diet.  She would like to exercise however causes her low back in her knee pain to be exacerbated with continued exercising.  She has been taking gabapentin 100 mg q.h.s. with minimal benefit.  She reports that sometimes her back will lock up on her in the muscles give very tight and is difficult for the muscle start relax.    Initial HPI:  This patient is a 40 y.o. female who presents today complaining of the above noted pain/s. The patient describes the pain as follows.  Ms. Burdick is a new patient to clinic with complaints low back pain, pelvic pain, bilateral knee pain.  She has been having symptoms off and on for several years with the pelvic pain increasing in frequency becoming daily.  Today she rates her pain as a 7/10 she has difficulty describing her pelvic pain and describes it as hurting.  Primary located around the pubic symphysis in the vagina.  She has been having irregular cycles for years with a cycle in May of 2019 and she reports the previous menstrual cycle she had was in 2016.  She has had abdominal surgery however this all to place when she was approximately 6 years old.  She reports having pancreatitis at that time and ultimately had cholecystectomy, appendectomy and bile duct reconstruction.  She denies having had any other abdominal surgeries.  She denies having any changes with eating and with bowel movements.  She finds her symptoms are worse with walking and her somewhat improved with rest.  Her knee pain does improved with rest.  She has been physical therapy recently and will start again in approximately 6 days.  She has been using meloxicam which has not been  beneficial and has found ibuprofen over-the-counter to be more helpful.  She denies having bowel bladder difficulties.    Previous Therapy:  Medications:Mobic and Ibuprofen, zoloft, gabapentin, diclofenac, Arthritis strength Tylenol  Injections: Knee Joint Injection bilateral intra-articular knee joint injections  Surgeries: None  Physical Therapy: Completed in the Past    Past Surgical History:   Procedure Laterality Date    APPENDECTOMY      BILE DUCT EXPLORATION      CHOLECYSTECTOMY      INJECTION OF JOINT Bilateral 9/30/2020    Procedure: Bilateral intraarticular knee injection with local;  Surgeon: Ozzy Sofia MD;  Location: [facility];  Service: Pain Management;  Laterality: Bilateral;     Imaging / Labs / Studies (reviewed on 11/17/2020):    Review of Systems:  Review of Systems   Constitutional: Negative for fever.   Eyes: Negative for blurred vision.   Respiratory: Negative for cough and wheezing.    Cardiovascular: Negative for chest pain and orthopnea.   Gastrointestinal: Negative for constipation, diarrhea, nausea and vomiting.   Genitourinary: Negative for dysuria.   Musculoskeletal: Positive for joint pain and neck pain.   Skin: Negative for itching and rash.   Neurological: Positive for tingling. Negative for weakness.   Endo/Heme/Allergies: Does not bruise/bleed easily.       Physical Exam:  There were no vitals taken for this visit. (reviewed on 11/17/2020)\  General    Constitutional: She is oriented to person, place, and time. She appears well-developed and well-nourished.   HENT:   Head: Normocephalic and atraumatic.   Eyes: EOM are normal.   Neck: Neck supple.   Cardiovascular: Normal rate.    Pulmonary/Chest: Effort normal.   Abdominal: She exhibits no distension.   Neurological: She is alert and oriented to person, place, and time. No cranial nerve deficit.   Psychiatric: She has a normal mood and affect.     General Musculoskeletal Exam   Gait: normal       Right Knee Exam      Inspection   Swelling: absent    Tenderness   The patient is tender to palpation of the medial joint line.    Range of Motion   Extension: normal   Flexion: normal     Other   Sensation: normal    Comments:  Minimal crepitus with flexion and extension    Left Knee Exam     Inspection   Swelling: absent    Tenderness   The patient tender to palpation of the medial joint line.    Range of Motion   Extension: normal   Flexion: normal     Other   Sensation: normal    Comments:  Minimal crepitus with flexion and extensionBack (L-Spine & T-Spine) / Neck (C-Spine) Exam     Tenderness   The patient is tender to palpation of the right trapezial and left trapezial.     Neck (C-Spine) Range of Motion   Flexion:     Normal  Extension: Normal  Right Lateral Bend: normal  Left Lateral Bend: normal  Right Rotation: normal  Left Rotation: normal    Comments:  Tender palpation over bilateral trapezius muscles; minimal tenderness palpation over cervical facets and paraspinous muscles; increased pain with flexion extension and left right lateral bending of the cervical spine      Muscle Strength   Right Upper Extremity   Biceps: 5/5   Deltoid:  5/5  Triceps:  5/5  Left Upper Extremity  Biceps: 5/5   Deltoid:  5/5  Triceps:  5/5    Reflexes     Left Side  Biceps:  2+  Triceps:  2+  Brachioradialis:  2+  Achilles:  2+  Quadriceps:  2+    Right Side   Biceps:  2+  Triceps:  2+  Brachioradialis:  2+  Achilles:  2+  Quadriceps:  2+      Assessment  Knee OA  Lumbar Spondylosis    1. 40 y.o. year old patient with PMH of   Past Medical History:   Diagnosis Date    Arthritis of knee 9/30/2020    IBS (irritable bowel syndrome)     Obesity     Pancreatitis       presenting with pain located pelvis, lumbar spine, bilateral knees  2. Pain Generators / Etiology: Lumbar Spondylosis and Knee Osteorarthritis, pelvic pain  3. Failed Meds (E- Effective, NE- Not Effective):  Mobic-not effective; ibuprofen-effective, gabapentin-effective,  Tylenol-effective  4. Physical Therapy - Completed in the Past and will restart in 6 days  5. Psychological comorbidities - None  6. Anticoagulants / Antiplatelets: None     PLAN:  1. Medications :  Can continue Tylenol Arthritis strength over-the-counter not to exceed 3000 mg daily; will add ibuprofen 800 mg tablets 3 times daily only as needed; will start Topamax 25 mg twice daily in addition to baclofen 10 mg tablets 3 times daily only as needed; did discuss potential side effects with each of these medications with the patient and to notify our clinic if she notices any  2. PT - continue with aqua therapy and other physical therapy exercises  3. Psychological - continue all medications as prescribed  4. Labs - obtain  none  5. Imaging - will schedule for cervical spine x-ray today and bilateral hip x-rays  6. Interventions - can consider intra-articular hip joint injections in the future;  7. Referrals - none  8. Records - none  9. Follow up visit - follow up in clinic in 4 weeks  10. Patient Questions - answered all of the patient's questions regarding diagnosis, therapy, and treatment  11.  This condition does not require this patient to take time off of work    GILMAR Sofia MD  Interventional Pain  [facility] [place]

## 2020-11-23 ENCOUNTER — PATIENT MESSAGE (OUTPATIENT)
Dept: PAIN MEDICINE | Facility: CLINIC | Age: 40
End: 2020-11-23

## 2020-12-04 ENCOUNTER — OFFICE VISIT (OUTPATIENT)
Dept: PAIN MEDICINE | Facility: CLINIC | Age: 40
End: 2020-12-04
Payer: COMMERCIAL

## 2020-12-04 ENCOUNTER — TELEPHONE (OUTPATIENT)
Dept: PAIN MEDICINE | Facility: CLINIC | Age: 40
End: 2020-12-04

## 2020-12-04 DIAGNOSIS — M54.12 CERVICAL RADICULOPATHY: Primary | ICD-10-CM

## 2020-12-04 DIAGNOSIS — M47.812 CERVICAL SPONDYLOSIS: ICD-10-CM

## 2020-12-04 PROCEDURE — 99214 OFFICE O/P EST MOD 30 MIN: CPT | Mod: 95,,, | Performed by: PAIN MEDICINE

## 2020-12-04 PROCEDURE — 99214 PR OFFICE/OUTPT VISIT, EST, LEVL IV, 30-39 MIN: ICD-10-PCS | Mod: 95,,, | Performed by: PAIN MEDICINE

## 2020-12-04 RX ORDER — METHYLPREDNISOLONE 4 MG/1
TABLET ORAL
Qty: 1 PACKAGE | Refills: 0 | Status: SHIPPED | OUTPATIENT
Start: 2020-12-04 | End: 2021-01-14

## 2020-12-04 NOTE — PATIENT INSTRUCTIONS
-obtain updated cervical spine MRI  -will provide prescription a Medrol Dosepak  -will have patient continue all medications  -a patient follow up in clinic to review MRI

## 2020-12-04 NOTE — PROGRESS NOTES
Chronic Pain-Tele-Medicine-Established Note (Follow up visit)   The patient location is:  [place]   The chief complaint leading to consultation is:  Neck pain and arm pain   Visit type: Virtual visit with synchronous audio and video   Total time spent with patient:  10 min   Each patient to whom he or she provides medical services by telemedicine is: (1) informed of the relationship between the physician and patient and the respective role of any other health care provider with respect to management of the patient; and (2) notified that he or she may decline to receive medical services by telemedicine and may withdraw from such care at any time.   Notes:   SUBJECTIVE:   Brook Burdick presents tele-medicine appointment for a follow-up appointment for neck and arm pain. Since the last visit, Brook Burdick states the pain in her neck has been progressively getting worse.  She has symptoms that radiate in her bilateral upper extremities in the C5 distribution to the anterior biceps.  She does endorse having weakness and tingling in her hands bilaterally.  She finds that she turns her head to the right her symptoms are worse in addition to other types of activities.  She does find it is difficult to go to sleep at night secondary to pain.  She has tried taking Tylenol,, tramadol, baclofen, ibuprofen all varying degrees benefit.  She takes most of these at night as they are sedating so she has difficulty taking them during the day and finds that is when the worst of her pain is.  Pain Medications:   - Opioids:  None  - NSAIDs:  Ibuprofen, Voltaren   - Anti-Depressants:  None   - Anti-Convulsants:  Gabapentin   - Others:  Baclofen, Zoloft, Topamax  Physical Therapy/Home Exercise: no    report: Not applicable   Pain Procedures:  Bilateral intra-articular knee joint injections   Imaging:  Bilateral hip and cervical spine x-rays   Past Medical History:   Diagnosis Date    Arthritis of knee 9/30/2020    IBS  (irritable bowel syndrome)     Obesity     Pancreatitis       Past Surgical History:   Procedure Laterality Date    APPENDECTOMY      BILE DUCT EXPLORATION      CHOLECYSTECTOMY      INJECTION OF JOINT Bilateral 9/30/2020    Procedure: Bilateral intraarticular knee injection with local;  Surgeon: Ozzy Sofia MD;  Location: [facility];  Service: Pain Management;  Laterality: Bilateral;      Social History     Socioeconomic History    Marital status: Single     Spouse name: Not on file    Number of children: Not on file    Years of education: Not on file    Highest education level: Not on file   Occupational History    Not on file   Social Needs    Financial resource strain: Not hard at all    Food insecurity     Worry: Never true     Inability: Never true    Transportation needs     Medical: No     Non-medical: No   Tobacco Use    Smoking status: Never Smoker    Smokeless tobacco: Never Used   Substance and Sexual Activity    Alcohol use: No     Frequency: Monthly or less     Drinks per session: 1 or 2     Binge frequency: Less than monthly    Drug use: No    Sexual activity: Never   Lifestyle    Physical activity     Days per week: 1 day     Minutes per session: 0 min    Stress: Very much   Relationships    Social connections     Talks on phone: Three times a week     Gets together: Twice a week     Attends Congregational service: Not on file     Active member of club or organization: Yes     Attends meetings of clubs or organizations: More than 4 times per year     Relationship status:    Other Topics Concern    Not on file   Social History Narrative    Not on file      Family History   Problem Relation Age of Onset    Atrial fibrillation Mother     Diabetes Father     Hypertension Father     Cancer Maternal Grandfather       Review of patient's allergies indicates:   Allergen Reactions    Penicillins Hives      Current Outpatient Medications   Medication Sig    acetaminophen  (TYLENOL) 650 MG TbSR Take 650 mg by mouth 2 (two) times a day.    albuterol (VENTOLIN HFA) 90 mcg/actuation inhaler Inhale 2 puffs into the lungs every 6 (six) hours as needed for Wheezing. Rescue (Patient not taking: Reported on 11/17/2020)    baclofen (LIORESAL) 10 MG tablet Take 1 tablet (10 mg total) by mouth 3 (three) times daily as needed.    diclofenac (VOLTAREN) 75 MG EC tablet TAKE 1 TABLET(75 MG) BY MOUTH TWICE DAILY AS NEEDED (Patient not taking: Reported on 11/17/2020)    dicyclomine (BENTYL) 20 mg tablet Take 1 tablet (20 mg total) by mouth every 6 (six) hours.    gabapentin (NEURONTIN) 300 MG capsule 1 cap qhs X 7 days then 1 cap BID X 7 days then 1 cap AM (300mg) and 2 cap PM (600mg) and continue. Stay at the most comfortable dose.    norethindrone ac-eth estradiol (JUNEL 1.5/30, 21,) 1.5-30 mg-mcg Tab Junel 1.5/30 (21) 1.5 mg-30 mcg tablet    nystatin-triamcinolone (MYCOLOG II) cream     sertraline (ZOLOFT) 100 MG tablet     topiramate (TOPAMAX) 25 MG tablet Take 1 tablet (25 mg total) by mouth 2 (two) times daily. Take 2 tabs nightly or twice daily if tolerated     No current facility-administered medications for this visit.       REVIEW OF SYSTEMS:   GENERAL: No weight loss, malaise or fevers.   HEENT: No recent changes in vision or hearing   NECK: Negative for lumps, no difficulty with swallowing.   RESPIRATORY: Negative for cough, wheezing or shortness of breath, patient denies any recent URI.   CARDIOVASCULAR: Negative for chest pain, leg swelling or palpitations.   GI: Negative for abdominal discomfort, blood in stools or black stools or change in bowel habits.   MUSCULOSKELETAL: See HPI.   SKIN: Negative for lesions, rash, and itching.   PSYCH: No mood disorder or recent psychosocial stressors. Patients sleep is not disturbed secondary to pain.   HEMATOLOGY/LYMPHOLOGY: Negative for prolonged bleeding, bruising easily or swollen nodes. Patient is not currently taking any  anti-coagulants   NEURO: No history of headaches, syncope, paralysis, seizures or tremors.   All other reviewed and negative other than HPI.   OBJECTIVE:   Constitutional:  Well-groomed  Head:  Normocephalic, atraumatic  Eyes:  Extraocular movements intact  Neck:  Supple  Skin:  No visible lesions or rashes  Neuro:  Cranial nerves 2-12 grossly intact  Respiratory:  Nonlabored breathing  Psych:  Normal affect      ASSESSMENT: 40 y.o. year old female with cervical spine pain, consistent with   Cervical spondylosis and cervical radiculopathy  PLAN:   -obtain cervical MRI as patient is having weakness and tingling in her bilateral upper extremities in addition to degenerative disc as evidence on cervical spine x-ray  -will provide prescription of Medrol Dosepak  -continue all medications as prescribed  -a patient follow up in clinic to review MRI  The above plan and management options were discussed at length with patient. Patient is in agreement with the above and verbalized understanding. It will be communicated with the referring physician via electronic record, fax, or mail.   Ozzy Sofia   12/04/2020

## 2020-12-04 NOTE — TELEPHONE ENCOUNTER
----- Message from Dimitris Molina sent at 12/4/2020  1:26 PM CST -----  .Type:  Patient Returning Call    Who Called:GLORAI ZHENG   Who Left Message for Patient:Nurse  Does the patient know what this is regarding?:  Would the patient rather a call back or a response via [employer]? Call  Best Call Back Number:.[phone]   Additional Information:

## 2020-12-06 ENCOUNTER — PATIENT MESSAGE (OUTPATIENT)
Dept: INTERNAL MEDICINE | Facility: CLINIC | Age: 40
End: 2020-12-06

## 2020-12-07 ENCOUNTER — PATIENT MESSAGE (OUTPATIENT)
Dept: INTERNAL MEDICINE | Facility: CLINIC | Age: 40
End: 2020-12-07

## 2020-12-07 RX ORDER — SERTRALINE HYDROCHLORIDE 100 MG/1
100 TABLET, FILM COATED ORAL DAILY
Qty: 30 TABLET | Refills: 5 | Status: SHIPPED | OUTPATIENT
Start: 2020-12-07 | End: 2021-05-26 | Stop reason: SDUPTHER

## 2020-12-08 DIAGNOSIS — M54.16 LUMBAR RADICULOPATHY: ICD-10-CM

## 2020-12-08 DIAGNOSIS — M47.816 LUMBAR SPONDYLOSIS: ICD-10-CM

## 2020-12-08 RX ORDER — DICLOFENAC SODIUM 75 MG/1
75 TABLET, DELAYED RELEASE ORAL 2 TIMES DAILY PRN
Qty: 60 TABLET | Refills: 0 | Status: SHIPPED | OUTPATIENT
Start: 2020-12-08 | End: 2021-01-14

## 2020-12-09 ENCOUNTER — CLINICAL SUPPORT (OUTPATIENT)
Dept: REHABILITATION | Facility: HOSPITAL | Age: 40
End: 2020-12-09
Payer: COMMERCIAL

## 2020-12-09 DIAGNOSIS — M54.2 NECK PAIN: Primary | ICD-10-CM

## 2020-12-09 DIAGNOSIS — M25.519 SHOULDER PAIN, UNSPECIFIED CHRONICITY, UNSPECIFIED LATERALITY: ICD-10-CM

## 2020-12-09 DIAGNOSIS — R29.898 RIGHT ARM WEAKNESS: ICD-10-CM

## 2020-12-09 DIAGNOSIS — M53.82 DECREASED ROM OF INTERVERTEBRAL DISCS OF CERVICAL SPINE: ICD-10-CM

## 2020-12-09 DIAGNOSIS — M79.601 RIGHT ARM PAIN: ICD-10-CM

## 2020-12-09 PROCEDURE — 97161 PT EVAL LOW COMPLEX 20 MIN: CPT

## 2020-12-09 PROCEDURE — 97140 MANUAL THERAPY 1/> REGIONS: CPT | Mod: 59

## 2020-12-09 NOTE — PLAN OF CARE
[facility]  Physical Therapy Initial Evaluation    Name: Brook Burdick  Clinic Number: [id number]    Therapy Diagnosis:   Encounter Diagnoses   Name Primary?    Shoulder pain, unspecified chronicity, unspecified laterality     Neck pain Yes    Right arm pain     Decreased ROM of intervertebral discs of cervical spine     Right arm weakness      Physician: Adamaris Arguello MD    Physician Orders: PT eval and treat   Medical Diagnosis from Referral:   M25.519 (ICD-10-CM) - Shoulder pain, unspecified chronicity, unspecified laterality   M54.2 (ICD-10-CM) - Neck pain       Evaluation Date: 12/9/2020  Authorization Period Expiration: 12/30/2020  Plan of Care Expiration: 1/9/21  Visit # / Visits authorized: 1/ 20    Time In: 1:30p  Time Out: 2:10p  Total Billable Time: 10 minutes    Precautions: Standard    Subjective   Date of onset: 2-3 weeks ago  History of current condition - Allegra reports: pt reports insidious onset of neck and R shoulder/UE pain 2-3 weeks ago. This includes neck stiffness, occipital HAs, mid/R neck pain and radiating pain into R shoulder and down R UE to elbow. She notes occasional tingling in R hand.      Pain:  Current 8/10, worst 10/10, best 4/10   Location: right neck , shoulder  and R UE   Description: Aching, Tingling and Shooting  Aggravating Factors: looking down, playing basketball, looking up  Easing Factors: pain medication    Prior Therapy: none  Social History:  lives with their family  Occupation:  and  at [employer]  Prior Level of Function: indep with all ADLs and mobility  Current Level of Function: impaired ADLs and work requirements due to neck/shoulder pain    Imaging, x-ray: decreased cervical lordosis, mild spondylosis. MRI tomorrow    Medical History:   Past Medical History:   Diagnosis Date    Arthritis of knee 9/30/2020    IBS (irritable bowel syndrome)     Obesity     Pancreatitis         Surgical History:   Brook Burdick  has a past surgical history that includes Appendectomy; Cholecystectomy; Bile duct exploration; and Injection of joint (Bilateral, 9/30/2020).    Medications:   Allegra has a current medication list which includes the following prescription(s): acetaminophen, albuterol, baclofen, diclofenac, dicyclomine, gabapentin, methylprednisolone, norethindrone ac-eth estradiol, nystatin-triamcinolone, sertraline, and topiramate.    Allergies:   Review of patient's allergies indicates:   Allergen Reactions    Penicillins Hives        Pts goals: to stop hurting    Objective       CMS Impairment/Limitation/Restriction for FOTO shoulder Survey    Therapist reviewed FOTO scores for Brook Burdick on 12/9/2020.   FOTO documents entered into [facility] - see Media section.    Limitation Score: 43%  Category: Other    Current : CK = at least 40% but < 60% impaired, limited or restricted  Goal: CJ = at least 20% but < 40% impaired, limited or restricted  Discharge: CJ = at least 20% but < 40% impaired, limited or restricted         Posture: Pt noted to present with forward head/rounded shoulder posture.    Shoulder ROM: B shoulder AROM grossly WNL. C/o pain in R shoulder with AROM flex    Cervical Spine AROM:   % Pain   FB wfl n   BB 60 y   RSB     LSB     RR 50 y   LR 60 tight     Strength:  Muscle (Myotome) Right Left   Shoulder Flex 4+ 5   Shoulder Abduction 4+ 5   Elbow Flexors (C5) 4+ 5   Wrist Extensors (C6) 4+ 5   Elbow Extensors (C7) 4+ 5     B mid traps and rhomboids 4/5    Sensation: IntactR UE    Special Test:  Quadrant test pos    R     spurling test    Pos R    Joint Mobility: very hypomobile/guarded cervical spine    Upper Limb Tension Test: pos R radial, ulnar, median    Tenderness to palpation:  Patient tender and tight B suboccipital, cerv PSM, upper traps R>L, mid traps.    TREATMENT     Allegra received therapeutic exercises to develop ROM and flexibility for 5 minutes  including:  HEP instruction and demonstration    Allegra received the following manual therapy techniques: Joint mobilizations were applied to the: cerv spine for 5 minutes, including:  Grade I oscillations cerv spine      Home Exercises and Patient Education Provided    Education provided:   -Education on condition, HEP, and educated on using towel roll as cervical support on top of pillow sleeping at night    Written Home Exercises Provided: yes.  Exercises were reviewed and Allegra was able to demonstrate them prior to the end of the session.  Allegra demonstrated good  understanding of the education provided.     See EMR under Patient Instructions for exercises provided 12/9/2020.    Assessment   Allegra is a 40 y.o. female referred to outpatient Physical Therapy with a medical diagnosis of   M25.519 (ICD-10-CM) - Shoulder pain, unspecified chronicity, unspecified laterality   M54.2 (ICD-10-CM) - Neck pain     . Pt presents with signs and symptoms consistent with cervical radiculopathy including neck pain, stiffness and guarding, decreased cervical ROM, positive neural tension R UE, weakness.    Pt prognosis is Good.   Pt will benefit from skilled outpatient Physical Therapy to address the deficits stated above and in the chart below, provide pt/family education, and to maximize pt's level of independence.     Plan of care discussed with patient: Yes  Pt's spiritual, cultural and educational needs considered and patient is agreeable to the plan of care and goals as stated below:     Anticipated Barriers for therapy: work requiring much computer work    Medical Necessity is demonstrated by the following  History  Co-morbidities and personal factors that may impact the plan of care Co-morbidities:   R clavicle fx 2007    Personal Factors:   no deficits     low   Examination  Body Structures and Functions, activity limitations and participation restrictions that may impact the plan of care Body Regions:    neck  upper extremities    Body Systems:    ROM  strength    Participation Restrictions:   none    Activity limitations:   Learning and applying knowledge  no deficits    General Tasks and Commands  no deficits    Communication  no deficits    Mobility  no deficits    Self care  no deficits    Domestic Life  no deficits    Interactions/Relationships  no deficits    Life Areas  no deficits    Community and Social Life  no deficits         low   Clinical Presentation stable and uncomplicated low   Decision Making/ Complexity Score: low     Goals:  Short Term Goals: In 3 weeks   1.I with HEP  2.AROM cervical ext 75%, R/L rotation 75%   3.Patient to have pain less than 5/10 at all times.  4.Patient to score less than 38% impaired on the FOTO    Long Term Goals: In 6 weeks  1. Patient to score less than 35% impaired on the FOTO  2. Patient to demo increase in R UE strength to 5/5 grossly  3. Patient to have decreased pain to 2/10 at all times.  4. Patient to demo increase cervical AROM to WNL grossly  5. 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  6. Absent R UE neural tension      Plan   Plan of care Certification: 12/9/2020 to 1/9/21.    Outpatient Physical Therapy 2 times weekly for 6 weeks to include the following interventions: Cervical/Lumbar Traction, Electrical Stimulation TENS, Manual Therapy, Moist Heat/ Ice, Neuromuscular Re-ed, Patient Education, Self Care, Therapeutic Exercise and TDN.     Sandoval Jeter, PT    Thank you for this referral.    These services are reasonable and necessary for the conditions set forth above while under my care.

## 2020-12-10 ENCOUNTER — HOSPITAL ENCOUNTER (OUTPATIENT)
Dept: RADIOLOGY | Facility: HOSPITAL | Age: 40
Discharge: HOME | End: 2020-12-10
Attending: PAIN MEDICINE
Payer: COMMERCIAL

## 2020-12-10 DIAGNOSIS — M54.12 CERVICAL RADICULOPATHY: ICD-10-CM

## 2020-12-10 PROCEDURE — 72141 MRI NECK SPINE W/O DYE: CPT | Mod: TC

## 2020-12-10 PROCEDURE — 72141 MRI CERVICAL SPINE WITHOUT CONTRAST: ICD-10-PCS | Mod: 26,,, | Performed by: RADIOLOGY

## 2020-12-10 PROCEDURE — 72141 MRI NECK SPINE W/O DYE: CPT | Mod: 26,,, | Performed by: RADIOLOGY

## 2020-12-11 ENCOUNTER — CLINICAL SUPPORT (OUTPATIENT)
Dept: REHABILITATION | Facility: HOSPITAL | Age: 40
End: 2020-12-11
Payer: COMMERCIAL

## 2020-12-11 DIAGNOSIS — M54.2 NECK PAIN: Primary | ICD-10-CM

## 2020-12-11 DIAGNOSIS — M79.601 RIGHT ARM PAIN: ICD-10-CM

## 2020-12-11 DIAGNOSIS — R29.898 RIGHT ARM WEAKNESS: ICD-10-CM

## 2020-12-11 DIAGNOSIS — M53.82 DECREASED ROM OF INTERVERTEBRAL DISCS OF CERVICAL SPINE: ICD-10-CM

## 2020-12-11 PROCEDURE — 97014 ELECTRIC STIMULATION THERAPY: CPT

## 2020-12-11 PROCEDURE — 97110 THERAPEUTIC EXERCISES: CPT

## 2020-12-11 PROCEDURE — 97140 MANUAL THERAPY 1/> REGIONS: CPT

## 2020-12-11 NOTE — PROGRESS NOTES
Physical Therapy Treatment Note     Name: Brook Burdick  Clinic Number: [id number]    Therapy Diagnosis:   Encounter Diagnoses   Name Primary?    Neck pain Yes    Right arm pain     Decreased ROM of intervertebral discs of cervical spine     Right arm weakness      Physician: Adamaris Arguello MD    Visit Date: 12/11/2020    Physician Orders: PT eval and treat   Medical Diagnosis from Referral:   M25.519 (ICD-10-CM) - Shoulder pain, unspecified chronicity, unspecified laterality   M54.2 (ICD-10-CM) - Neck pain         Evaluation Date: 12/9/2020  Authorization Period Expiration: 12/30/2020  Plan of Care Expiration: 1/9/21  Visit # / Visits authorized: 2/ 20      Time In: 9:49a  Time Out: 10:38a  Total Billable Time: 39 minutes    Precautions: Standard    Subjective     Pt reports: she had MRI yesterday. Pt reports feeling stiffness across her neck from MRI. She has not had any R hand symptoms since initial visit.  She was compliant with home exercise program.  Response to previous treatment: no significant complaints  Functional change: no change at this time    Pain: 7/10  Location: right neck      Objective     Allegra received therapeutic exercises to develop strength, endurance, ROM, flexibility, posture and core stabilization for 24 minutes including:  nustep 5 min  Supine cervical decompression rotations and nodding 2 min ea  Seated chin tucks 3/10  Open books x 15 ea  Face pulls 3/10/red cord    Allegra received the following manual therapy techniques: Joint mobilizations and Soft tissue Mobilization were applied to the: cervical spine for 15 minutes, including:  Grade I-II unilateral and central PA mobs Cervical spine and upper thoracic  STM suboccipital, cerv psm, upper traps  Manual cerv. traction    Allegra received the following supervised modalities after being cleared for contradictions: TENS:  Allegra received TENS electrical stimulation for pain to the cervical. Pt received continuous mode at  a rate of 150 pps for 10 minutes. Allegra tolerated treatment well without any adverse effects.       Allegra received cold pack for 10 minutes to cervical.      Home Exercises Provided and Patient Education Provided     Education provided:   - continue with HEP    Written Home Exercises Provided: Patient instructed to cont prior HEP.  Exercises were reviewed and Allegra was able to demonstrate them prior to the end of the session.  Allegra demonstrated good  understanding of the education provided.     See EMR under Patient Instructions for exercises provided prior visit.    Assessment     Initiated full treatment plan today including manual cervical joint mobs and STM therex for strengthening, joint mobility. Pt tolerated manual treatment with significant discomfort initially, but did ease after manual traction. She tolerated additional joint mobs following traction with less difficulty. She noted some soreness and fatigue with therex, but no significant complaints. Pt noted her neck felt less stiff following treatment.    Allegra is progressing well towards her goals.   Pt prognosis is Good.     Pt will continue to benefit from skilled outpatient physical therapy to address the deficits listed in the problem list box on initial evaluation, provide pt/family education and to maximize pt's level of independence in the home and community environment.     Pt's spiritual, cultural and educational needs considered and pt agreeable to plan of care and goals.     Anticipated barriers to physical therapy: none    Goals:  Short Term Goals: In 3 weeks   1.I with HEP  2.AROM cervical ext 75%, R/L rotation 75%   3.Patient to have pain less than 5/10 at all times.  4.Patient to score less than 38% impaired on the FOTO     Long Term Goals: In 6 weeks  1. Patient to score less than 35% impaired on the FOTO  2. Patient to demo increase in R UE strength to 5/5 grossly  3. Patient to have decreased pain to 2/10 at all times.  4.  Patient to demo increase cervical AROM to WNL grossly  5. 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  6. Absent R UE neural tension       Plan     Plan of care Certification: 12/9/2020 to 1/9/21.     Outpatient Physical Therapy 2 times weekly for 6 weeks to include the following interventions: Cervical/Lumbar Traction, Electrical Stimulation TENS, Manual Therapy, Moist Heat/ Ice, Neuromuscular Re-ed, Patient Education, Self Care, Therapeutic Exercise and TDN.     Sandoval Jeter, PT

## 2020-12-14 ENCOUNTER — TELEPHONE (OUTPATIENT)
Dept: PAIN MEDICINE | Facility: CLINIC | Age: 40
End: 2020-12-14

## 2020-12-14 ENCOUNTER — TELEPHONE (OUTPATIENT)
Dept: INTERNAL MEDICINE | Facility: CLINIC | Age: 40
End: 2020-12-14

## 2020-12-14 RX ORDER — DICYCLOMINE HYDROCHLORIDE 20 MG/1
20 TABLET ORAL EVERY 6 HOURS
Qty: 120 TABLET | Refills: 0 | Status: SHIPPED | OUTPATIENT
Start: 2020-12-14 | End: 2021-01-13

## 2020-12-14 NOTE — TELEPHONE ENCOUNTER
Spoke with mrs tijerina she requested the follow appt be virtual I has Mrs camacho change the appt   
No

## 2021-01-05 ENCOUNTER — DOCUMENTATION ONLY (OUTPATIENT)
Dept: REHABILITATION | Facility: HOSPITAL | Age: 41
End: 2021-01-05

## 2021-01-14 ENCOUNTER — OFFICE VISIT (OUTPATIENT)
Dept: INTERNAL MEDICINE | Facility: CLINIC | Age: 41
End: 2021-01-14
Payer: COMMERCIAL

## 2021-01-14 VITALS
HEIGHT: 72 IN | DIASTOLIC BLOOD PRESSURE: 70 MMHG | OXYGEN SATURATION: 97 % | TEMPERATURE: 98 F | WEIGHT: 293 LBS | BODY MASS INDEX: 39.68 KG/M2 | SYSTOLIC BLOOD PRESSURE: 118 MMHG | HEART RATE: 72 BPM

## 2021-01-14 DIAGNOSIS — M54.2 NECK PAIN: Primary | ICD-10-CM

## 2021-01-14 DIAGNOSIS — F41.9 ANXIETY AND DEPRESSION: ICD-10-CM

## 2021-01-14 DIAGNOSIS — F32.A ANXIETY AND DEPRESSION: ICD-10-CM

## 2021-01-14 PROCEDURE — 1125F PR PAIN SEVERITY QUANTIFIED, PAIN PRESENT: ICD-10-PCS | Mod: S$GLB,,, | Performed by: FAMILY MEDICINE

## 2021-01-14 PROCEDURE — 3008F BODY MASS INDEX DOCD: CPT | Mod: CPTII,S$GLB,, | Performed by: FAMILY MEDICINE

## 2021-01-14 PROCEDURE — 3008F PR BODY MASS INDEX (BMI) DOCUMENTED: ICD-10-PCS | Mod: CPTII,S$GLB,, | Performed by: FAMILY MEDICINE

## 2021-01-14 PROCEDURE — 99214 OFFICE O/P EST MOD 30 MIN: CPT | Mod: S$GLB,,, | Performed by: FAMILY MEDICINE

## 2021-01-14 PROCEDURE — 99999 PR PBB SHADOW E&M-EST. PATIENT-LVL IV: CPT | Mod: PBBFAC,,, | Performed by: FAMILY MEDICINE

## 2021-01-14 PROCEDURE — 99999 PR PBB SHADOW E&M-EST. PATIENT-LVL IV: ICD-10-PCS | Mod: PBBFAC,,, | Performed by: FAMILY MEDICINE

## 2021-01-14 PROCEDURE — 1125F AMNT PAIN NOTED PAIN PRSNT: CPT | Mod: S$GLB,,, | Performed by: FAMILY MEDICINE

## 2021-01-14 PROCEDURE — 99214 PR OFFICE/OUTPT VISIT, EST, LEVL IV, 30-39 MIN: ICD-10-PCS | Mod: S$GLB,,, | Performed by: FAMILY MEDICINE

## 2021-01-14 RX ORDER — IBUPROFEN 800 MG/1
TABLET ORAL
COMMUNITY
Start: 2021-01-04 | End: 2021-01-29 | Stop reason: ALTCHOICE

## 2021-01-14 RX ORDER — BUPROPION HYDROCHLORIDE 150 MG/1
150 TABLET ORAL DAILY
Qty: 30 TABLET | Refills: 11 | Status: SHIPPED | OUTPATIENT
Start: 2021-01-14 | End: 2021-02-22 | Stop reason: SDUPTHER

## 2021-01-18 PROBLEM — F32.A ANXIETY AND DEPRESSION: Status: ACTIVE | Noted: 2021-01-18

## 2021-01-18 PROBLEM — F41.9 ANXIETY AND DEPRESSION: Status: ACTIVE | Noted: 2021-01-18

## 2021-01-29 ENCOUNTER — OFFICE VISIT (OUTPATIENT)
Dept: PAIN MEDICINE | Facility: CLINIC | Age: 41
End: 2021-01-29
Payer: COMMERCIAL

## 2021-01-29 ENCOUNTER — PATIENT MESSAGE (OUTPATIENT)
Dept: PAIN MEDICINE | Facility: CLINIC | Age: 41
End: 2021-01-29

## 2021-01-29 DIAGNOSIS — E66.01 MORBID OBESITY WITH BMI OF 40.0-44.9, ADULT: ICD-10-CM

## 2021-01-29 DIAGNOSIS — G89.29 CHRONIC MYOFASCIAL PAIN: ICD-10-CM

## 2021-01-29 DIAGNOSIS — M79.12 MYALGIA OF AUXILIARY MUSCLES, HEAD AND NECK: Primary | ICD-10-CM

## 2021-01-29 DIAGNOSIS — M79.18 CHRONIC MYOFASCIAL PAIN: ICD-10-CM

## 2021-01-29 PROCEDURE — 99213 OFFICE O/P EST LOW 20 MIN: CPT | Mod: 95,,, | Performed by: PHYSICAL MEDICINE & REHABILITATION

## 2021-01-29 PROCEDURE — 99213 PR OFFICE/OUTPT VISIT, EST, LEVL III, 20-29 MIN: ICD-10-PCS | Mod: 95,,, | Performed by: PHYSICAL MEDICINE & REHABILITATION

## 2021-01-29 RX ORDER — CYCLOBENZAPRINE HCL 10 MG
TABLET ORAL
Qty: 60 TABLET | Refills: 1 | Status: SHIPPED | OUTPATIENT
Start: 2021-01-29 | End: 2021-03-30

## 2021-02-09 ENCOUNTER — PATIENT MESSAGE (OUTPATIENT)
Dept: PAIN MEDICINE | Facility: CLINIC | Age: 41
End: 2021-02-09

## 2021-02-11 ENCOUNTER — TELEPHONE (OUTPATIENT)
Dept: PAIN MEDICINE | Facility: CLINIC | Age: 41
End: 2021-02-11

## 2021-02-12 ENCOUNTER — OFFICE VISIT (OUTPATIENT)
Dept: PAIN MEDICINE | Facility: CLINIC | Age: 41
End: 2021-02-12
Payer: COMMERCIAL

## 2021-02-12 VITALS
DIASTOLIC BLOOD PRESSURE: 83 MMHG | HEIGHT: 72 IN | HEART RATE: 85 BPM | SYSTOLIC BLOOD PRESSURE: 135 MMHG | BODY MASS INDEX: 39.68 KG/M2 | WEIGHT: 293 LBS

## 2021-02-12 DIAGNOSIS — M79.18 CHRONIC MYOFASCIAL PAIN: ICD-10-CM

## 2021-02-12 DIAGNOSIS — E66.01 MORBID OBESITY WITH BMI OF 40.0-44.9, ADULT: ICD-10-CM

## 2021-02-12 DIAGNOSIS — M79.12 MYALGIA OF AUXILIARY MUSCLES, HEAD AND NECK: Primary | ICD-10-CM

## 2021-02-12 DIAGNOSIS — G89.29 CHRONIC MYOFASCIAL PAIN: ICD-10-CM

## 2021-02-12 PROCEDURE — 99214 OFFICE O/P EST MOD 30 MIN: CPT | Mod: 25,S$GLB,, | Performed by: PHYSICAL MEDICINE & REHABILITATION

## 2021-02-12 PROCEDURE — 99999 PR PBB SHADOW E&M-EST. PATIENT-LVL III: CPT | Mod: PBBFAC,,, | Performed by: PHYSICAL MEDICINE & REHABILITATION

## 2021-02-12 PROCEDURE — 1125F AMNT PAIN NOTED PAIN PRSNT: CPT | Mod: S$GLB,,, | Performed by: PHYSICAL MEDICINE & REHABILITATION

## 2021-02-12 PROCEDURE — 20553 NJX 1/MLT TRIGGER POINTS 3/>: CPT | Mod: S$GLB,,, | Performed by: PHYSICAL MEDICINE & REHABILITATION

## 2021-02-12 PROCEDURE — 3008F PR BODY MASS INDEX (BMI) DOCUMENTED: ICD-10-PCS | Mod: CPTII,S$GLB,, | Performed by: PHYSICAL MEDICINE & REHABILITATION

## 2021-02-12 PROCEDURE — 3008F BODY MASS INDEX DOCD: CPT | Mod: CPTII,S$GLB,, | Performed by: PHYSICAL MEDICINE & REHABILITATION

## 2021-02-12 PROCEDURE — 99999 PR PBB SHADOW E&M-EST. PATIENT-LVL III: ICD-10-PCS | Mod: PBBFAC,,, | Performed by: PHYSICAL MEDICINE & REHABILITATION

## 2021-02-12 PROCEDURE — 1125F PR PAIN SEVERITY QUANTIFIED, PAIN PRESENT: ICD-10-PCS | Mod: S$GLB,,, | Performed by: PHYSICAL MEDICINE & REHABILITATION

## 2021-02-12 PROCEDURE — 20553 PR INJECT TRIGGER POINTS, > 3: ICD-10-PCS | Mod: S$GLB,,, | Performed by: PHYSICAL MEDICINE & REHABILITATION

## 2021-02-12 PROCEDURE — 99214 PR OFFICE/OUTPT VISIT, EST, LEVL IV, 30-39 MIN: ICD-10-PCS | Mod: 25,S$GLB,, | Performed by: PHYSICAL MEDICINE & REHABILITATION

## 2021-02-12 RX ORDER — KETOROLAC TROMETHAMINE 30 MG/ML
30 INJECTION, SOLUTION INTRAMUSCULAR; INTRAVENOUS ONCE
Status: COMPLETED | OUTPATIENT
Start: 2021-02-12 | End: 2021-02-12

## 2021-02-12 RX ADMIN — KETOROLAC TROMETHAMINE 30 MG: 30 INJECTION, SOLUTION INTRAMUSCULAR; INTRAVENOUS at 11:02

## 2021-02-22 ENCOUNTER — OFFICE VISIT (OUTPATIENT)
Dept: INTERNAL MEDICINE | Facility: CLINIC | Age: 41
End: 2021-02-22
Payer: COMMERCIAL

## 2021-02-22 DIAGNOSIS — F32.A ANXIETY AND DEPRESSION: Primary | ICD-10-CM

## 2021-02-22 DIAGNOSIS — F41.9 ANXIETY AND DEPRESSION: Primary | ICD-10-CM

## 2021-02-22 PROCEDURE — 99213 OFFICE O/P EST LOW 20 MIN: CPT | Mod: 95,,, | Performed by: FAMILY MEDICINE

## 2021-02-22 PROCEDURE — 99213 PR OFFICE/OUTPT VISIT, EST, LEVL III, 20-29 MIN: ICD-10-PCS | Mod: 95,,, | Performed by: FAMILY MEDICINE

## 2021-02-22 RX ORDER — BUPROPION HYDROCHLORIDE 300 MG/1
300 TABLET ORAL DAILY
Qty: 30 TABLET | Refills: 3 | Status: SHIPPED | OUTPATIENT
Start: 2021-02-22 | End: 2022-01-06

## 2021-03-16 ENCOUNTER — TELEPHONE (OUTPATIENT)
Dept: INTERNAL MEDICINE | Facility: CLINIC | Age: 41
End: 2021-03-16

## 2021-03-18 ENCOUNTER — PATIENT MESSAGE (OUTPATIENT)
Dept: INTERNAL MEDICINE | Facility: CLINIC | Age: 41
End: 2021-03-18

## 2021-03-18 ENCOUNTER — OFFICE VISIT (OUTPATIENT)
Dept: INTERNAL MEDICINE | Facility: CLINIC | Age: 41
End: 2021-03-18
Payer: COMMERCIAL

## 2021-03-18 VITALS — BODY MASS INDEX: 47.6 KG/M2 | HEIGHT: 72 IN

## 2021-03-18 DIAGNOSIS — R05.9 COUGH: Primary | ICD-10-CM

## 2021-03-18 DIAGNOSIS — J40 BRONCHITIS: ICD-10-CM

## 2021-03-18 PROCEDURE — 99213 PR OFFICE/OUTPT VISIT, EST, LEVL III, 20-29 MIN: ICD-10-PCS | Mod: 95,,, | Performed by: FAMILY MEDICINE

## 2021-03-18 PROCEDURE — 1125F AMNT PAIN NOTED PAIN PRSNT: CPT | Mod: ,,, | Performed by: FAMILY MEDICINE

## 2021-03-18 PROCEDURE — 1125F PR PAIN SEVERITY QUANTIFIED, PAIN PRESENT: ICD-10-PCS | Mod: ,,, | Performed by: FAMILY MEDICINE

## 2021-03-18 PROCEDURE — 3008F BODY MASS INDEX DOCD: CPT | Mod: CPTII,,, | Performed by: FAMILY MEDICINE

## 2021-03-18 PROCEDURE — 99213 OFFICE O/P EST LOW 20 MIN: CPT | Mod: 95,,, | Performed by: FAMILY MEDICINE

## 2021-03-18 PROCEDURE — 3008F PR BODY MASS INDEX (BMI) DOCUMENTED: ICD-10-PCS | Mod: CPTII,,, | Performed by: FAMILY MEDICINE

## 2021-03-18 RX ORDER — FLUTICASONE PROPIONATE 110 UG/1
1 AEROSOL, METERED RESPIRATORY (INHALATION) 2 TIMES DAILY
Qty: 12 G | Refills: 0 | Status: SHIPPED | OUTPATIENT
Start: 2021-03-18 | End: 2021-09-23

## 2021-03-18 RX ORDER — ALBUTEROL SULFATE 90 UG/1
2 AEROSOL, METERED RESPIRATORY (INHALATION) EVERY 6 HOURS PRN
Qty: 18 G | Refills: 0 | Status: SHIPPED | OUTPATIENT
Start: 2021-03-18 | End: 2021-09-23

## 2021-03-18 RX ORDER — MONTELUKAST SODIUM 10 MG/1
10 TABLET ORAL NIGHTLY
Qty: 30 TABLET | Refills: 1 | Status: SHIPPED | OUTPATIENT
Start: 2021-03-18 | End: 2021-04-17

## 2021-04-26 ENCOUNTER — OFFICE VISIT (OUTPATIENT)
Dept: INTERNAL MEDICINE | Facility: CLINIC | Age: 41
End: 2021-04-26
Payer: COMMERCIAL

## 2021-04-26 DIAGNOSIS — M17.10 ARTHRITIS OF KNEE: ICD-10-CM

## 2021-04-26 DIAGNOSIS — R53.83 FATIGUE, UNSPECIFIED TYPE: ICD-10-CM

## 2021-04-26 DIAGNOSIS — F41.9 ANXIETY AND DEPRESSION: Primary | ICD-10-CM

## 2021-04-26 DIAGNOSIS — F32.A ANXIETY AND DEPRESSION: Primary | ICD-10-CM

## 2021-04-26 PROCEDURE — 99214 PR OFFICE/OUTPT VISIT, EST, LEVL IV, 30-39 MIN: ICD-10-PCS | Mod: 95,,, | Performed by: FAMILY MEDICINE

## 2021-04-26 PROCEDURE — 99214 OFFICE O/P EST MOD 30 MIN: CPT | Mod: 95,,, | Performed by: FAMILY MEDICINE

## 2021-04-27 ENCOUNTER — PATIENT MESSAGE (OUTPATIENT)
Dept: INTERNAL MEDICINE | Facility: CLINIC | Age: 41
End: 2021-04-27

## 2021-04-27 DIAGNOSIS — R22.1 NECK SWELLING: Primary | ICD-10-CM

## 2021-04-28 ENCOUNTER — HOSPITAL ENCOUNTER (OUTPATIENT)
Dept: RADIOLOGY | Facility: HOSPITAL | Age: 41
Discharge: HOME | End: 2021-04-28
Attending: FAMILY MEDICINE
Payer: COMMERCIAL

## 2021-04-28 ENCOUNTER — PATIENT MESSAGE (OUTPATIENT)
Dept: RESEARCH | Facility: HOSPITAL | Age: 41
End: 2021-04-28

## 2021-04-28 ENCOUNTER — PATIENT MESSAGE (OUTPATIENT)
Dept: INTERNAL MEDICINE | Facility: CLINIC | Age: 41
End: 2021-04-28

## 2021-04-28 DIAGNOSIS — R22.1 NECK SWELLING: ICD-10-CM

## 2021-04-28 PROCEDURE — 76536 US EXAM OF HEAD AND NECK: CPT | Mod: TC

## 2021-04-29 DIAGNOSIS — R79.89 ELEVATED LIVER FUNCTION TESTS: Primary | ICD-10-CM

## 2021-05-05 ENCOUNTER — TELEPHONE (OUTPATIENT)
Dept: PAIN MEDICINE | Facility: CLINIC | Age: 41
End: 2021-05-05

## 2021-05-05 ENCOUNTER — OFFICE VISIT (OUTPATIENT)
Dept: PAIN MEDICINE | Facility: CLINIC | Age: 41
End: 2021-05-05
Payer: COMMERCIAL

## 2021-05-05 DIAGNOSIS — E66.01 MORBID OBESITY WITH BMI OF 40.0-44.9, ADULT: ICD-10-CM

## 2021-05-05 DIAGNOSIS — M47.816 LUMBAR SPONDYLOSIS: ICD-10-CM

## 2021-05-05 DIAGNOSIS — M79.12 MYALGIA OF AUXILIARY MUSCLES, HEAD AND NECK: ICD-10-CM

## 2021-05-05 DIAGNOSIS — R10.2 PELVIC PAIN: ICD-10-CM

## 2021-05-05 DIAGNOSIS — M79.18 CHRONIC MYOFASCIAL PAIN: ICD-10-CM

## 2021-05-05 DIAGNOSIS — G89.29 CHRONIC MYOFASCIAL PAIN: ICD-10-CM

## 2021-05-05 DIAGNOSIS — M54.16 LUMBAR RADICULOPATHY: Primary | ICD-10-CM

## 2021-05-05 PROCEDURE — 99213 OFFICE O/P EST LOW 20 MIN: CPT | Mod: 95,,, | Performed by: PHYSICAL MEDICINE & REHABILITATION

## 2021-05-05 PROCEDURE — 99213 PR OFFICE/OUTPT VISIT, EST, LEVL III, 20-29 MIN: ICD-10-PCS | Mod: 95,,, | Performed by: PHYSICAL MEDICINE & REHABILITATION

## 2021-05-05 RX ORDER — CYCLOBENZAPRINE HCL 10 MG
TABLET ORAL
Qty: 60 TABLET | Refills: 1 | Status: SHIPPED | OUTPATIENT
Start: 2021-05-05 | End: 2021-06-23 | Stop reason: SDUPTHER

## 2021-05-05 RX ORDER — BACLOFEN 10 MG/1
5-10 TABLET ORAL 3 TIMES DAILY
Qty: 90 TABLET | Refills: 2 | Status: SHIPPED | OUTPATIENT
Start: 2021-05-05 | End: 2021-06-23 | Stop reason: SDUPTHER

## 2021-05-06 ENCOUNTER — PATIENT MESSAGE (OUTPATIENT)
Dept: INTERNAL MEDICINE | Facility: CLINIC | Age: 41
End: 2021-05-06

## 2021-05-07 ENCOUNTER — PATIENT MESSAGE (OUTPATIENT)
Dept: INTERNAL MEDICINE | Facility: CLINIC | Age: 41
End: 2021-05-07

## 2021-05-07 DIAGNOSIS — F32.A ANXIETY AND DEPRESSION: Primary | ICD-10-CM

## 2021-05-07 DIAGNOSIS — F41.9 ANXIETY AND DEPRESSION: Primary | ICD-10-CM

## 2021-05-10 ENCOUNTER — PATIENT MESSAGE (OUTPATIENT)
Dept: INTERNAL MEDICINE | Facility: CLINIC | Age: 41
End: 2021-05-10

## 2021-05-20 ENCOUNTER — PATIENT MESSAGE (OUTPATIENT)
Dept: INTERNAL MEDICINE | Facility: CLINIC | Age: 41
End: 2021-05-20

## 2021-05-21 ENCOUNTER — PATIENT MESSAGE (OUTPATIENT)
Dept: INTERNAL MEDICINE | Facility: CLINIC | Age: 41
End: 2021-05-21

## 2021-05-21 RX ORDER — PROMETHAZINE HYDROCHLORIDE AND DEXTROMETHORPHAN HYDROBROMIDE 6.25; 15 MG/5ML; MG/5ML
5 SYRUP ORAL EVERY 4 HOURS PRN
Qty: 180 ML | Refills: 0 | Status: SHIPPED | OUTPATIENT
Start: 2021-05-21 | End: 2021-05-31

## 2021-05-26 RX ORDER — SERTRALINE HYDROCHLORIDE 100 MG/1
TABLET, FILM COATED ORAL
Qty: 90 TABLET | Refills: 3 | OUTPATIENT
Start: 2021-05-26

## 2021-05-26 RX ORDER — SERTRALINE HYDROCHLORIDE 100 MG/1
100 TABLET, FILM COATED ORAL DAILY
Qty: 90 TABLET | Refills: 3 | Status: SHIPPED | OUTPATIENT
Start: 2021-05-26 | End: 2021-06-23 | Stop reason: ALTCHOICE

## 2021-05-27 ENCOUNTER — PATIENT MESSAGE (OUTPATIENT)
Dept: PAIN MEDICINE | Facility: CLINIC | Age: 41
End: 2021-05-27

## 2021-05-28 ENCOUNTER — TELEPHONE (OUTPATIENT)
Dept: PSYCHIATRY | Facility: CLINIC | Age: 41
End: 2021-05-28

## 2021-05-28 ENCOUNTER — PATIENT MESSAGE (OUTPATIENT)
Dept: PAIN MEDICINE | Facility: CLINIC | Age: 41
End: 2021-05-28

## 2021-05-28 ENCOUNTER — LAB VISIT (OUTPATIENT)
Dept: LAB | Facility: HOSPITAL | Age: 41
End: 2021-05-28
Payer: COMMERCIAL

## 2021-05-28 DIAGNOSIS — R79.89 ELEVATED LIVER FUNCTION TESTS: ICD-10-CM

## 2021-05-28 DIAGNOSIS — M54.16 LUMBAR RADICULOPATHY: ICD-10-CM

## 2021-05-28 DIAGNOSIS — M47.816 LUMBAR SPONDYLOSIS: ICD-10-CM

## 2021-05-28 LAB
ALBUMIN SERPL BCP-MCNC: 3.4 G/DL (ref 3.5–5.2)
ALP SERPL-CCNC: 68 U/L (ref 55–135)
ALT SERPL W/O P-5'-P-CCNC: 56 U/L (ref 10–44)
AST SERPL-CCNC: 44 U/L (ref 10–40)
BILIRUB DIRECT SERPL-MCNC: 0.2 MG/DL (ref 0.1–0.3)
BILIRUB SERPL-MCNC: 0.4 MG/DL (ref 0.1–1)
PROT SERPL-MCNC: 7.4 G/DL (ref 6–8.4)

## 2021-05-28 PROCEDURE — 80076 HEPATIC FUNCTION PANEL: CPT | Performed by: FAMILY MEDICINE

## 2021-05-28 PROCEDURE — 36415 COLL VENOUS BLD VENIPUNCTURE: CPT | Performed by: FAMILY MEDICINE

## 2021-05-28 RX ORDER — GABAPENTIN 300 MG/1
CAPSULE ORAL
Qty: 150 CAPSULE | Refills: 11 | Status: SHIPPED | OUTPATIENT
Start: 2021-05-28 | End: 2021-06-23

## 2021-05-31 ENCOUNTER — TELEPHONE (OUTPATIENT)
Dept: PSYCHIATRY | Facility: CLINIC | Age: 41
End: 2021-05-31

## 2021-05-31 ENCOUNTER — PATIENT MESSAGE (OUTPATIENT)
Dept: INTERNAL MEDICINE | Facility: CLINIC | Age: 41
End: 2021-05-31

## 2021-05-31 ENCOUNTER — OFFICE VISIT (OUTPATIENT)
Dept: PSYCHIATRY | Facility: CLINIC | Age: 41
End: 2021-05-31
Payer: COMMERCIAL

## 2021-05-31 DIAGNOSIS — F41.1 GENERALIZED ANXIETY DISORDER WITH PANIC ATTACKS: ICD-10-CM

## 2021-05-31 DIAGNOSIS — F32.A ANXIETY AND DEPRESSION: ICD-10-CM

## 2021-05-31 DIAGNOSIS — F41.9 ANXIETY AND DEPRESSION: ICD-10-CM

## 2021-05-31 DIAGNOSIS — F41.0 GENERALIZED ANXIETY DISORDER WITH PANIC ATTACKS: ICD-10-CM

## 2021-05-31 DIAGNOSIS — F33.1 MODERATE RECURRENT MAJOR DEPRESSION: Primary | ICD-10-CM

## 2021-05-31 PROCEDURE — 90792 PR PSYCHIATRIC DIAGNOSTIC EVALUATION W/MEDICAL SERVICES: ICD-10-PCS | Mod: 95,,, | Performed by: NURSE PRACTITIONER

## 2021-05-31 PROCEDURE — 90792 PSYCH DIAG EVAL W/MED SRVCS: CPT | Mod: 95,,, | Performed by: NURSE PRACTITIONER

## 2021-06-02 ENCOUNTER — OFFICE VISIT (OUTPATIENT)
Dept: INTERNAL MEDICINE | Facility: CLINIC | Age: 41
End: 2021-06-02
Payer: COMMERCIAL

## 2021-06-02 ENCOUNTER — PATIENT MESSAGE (OUTPATIENT)
Dept: INTERNAL MEDICINE | Facility: CLINIC | Age: 41
End: 2021-06-02

## 2021-06-02 DIAGNOSIS — R79.89 ELEVATED LIVER FUNCTION TESTS: Primary | ICD-10-CM

## 2021-06-02 PROCEDURE — 99213 PR OFFICE/OUTPT VISIT, EST, LEVL III, 20-29 MIN: ICD-10-PCS | Mod: 95,,, | Performed by: FAMILY MEDICINE

## 2021-06-02 PROCEDURE — 99213 OFFICE O/P EST LOW 20 MIN: CPT | Mod: 95,,, | Performed by: FAMILY MEDICINE

## 2021-06-03 ENCOUNTER — TELEPHONE (OUTPATIENT)
Dept: RADIOLOGY | Facility: HOSPITAL | Age: 41
End: 2021-06-03

## 2021-06-04 ENCOUNTER — HOSPITAL ENCOUNTER (OUTPATIENT)
Dept: RADIOLOGY | Facility: HOSPITAL | Age: 41
Discharge: HOME | End: 2021-06-04
Attending: FAMILY MEDICINE
Payer: COMMERCIAL

## 2021-06-04 DIAGNOSIS — R79.89 ELEVATED LIVER FUNCTION TESTS: ICD-10-CM

## 2021-06-04 PROCEDURE — 76705 ECHO EXAM OF ABDOMEN: CPT | Mod: TC

## 2021-06-04 PROCEDURE — 76705 US ABDOMEN LIMITED: ICD-10-PCS | Mod: 26,,, | Performed by: RADIOLOGY

## 2021-06-04 PROCEDURE — 76705 ECHO EXAM OF ABDOMEN: CPT | Mod: 26,,, | Performed by: RADIOLOGY

## 2021-06-07 ENCOUNTER — PATIENT MESSAGE (OUTPATIENT)
Dept: INTERNAL MEDICINE | Facility: CLINIC | Age: 41
End: 2021-06-07

## 2021-06-07 DIAGNOSIS — R79.89 ELEVATED LIVER FUNCTION TESTS: Primary | ICD-10-CM

## 2021-06-14 ENCOUNTER — OFFICE VISIT (OUTPATIENT)
Dept: GASTROENTEROLOGY | Facility: CLINIC | Age: 41
End: 2021-06-14
Payer: COMMERCIAL

## 2021-06-14 ENCOUNTER — TELEPHONE (OUTPATIENT)
Dept: GASTROENTEROLOGY | Facility: CLINIC | Age: 41
End: 2021-06-14

## 2021-06-14 VITALS
WEIGHT: 293 LBS | DIASTOLIC BLOOD PRESSURE: 90 MMHG | SYSTOLIC BLOOD PRESSURE: 126 MMHG | OXYGEN SATURATION: 98 % | BODY MASS INDEX: 39.68 KG/M2 | HEIGHT: 72 IN | HEART RATE: 88 BPM

## 2021-06-14 DIAGNOSIS — R79.89 ELEVATED LIVER FUNCTION TESTS: Primary | ICD-10-CM

## 2021-06-14 DIAGNOSIS — Z86.010 HISTORY OF COLON POLYPS: ICD-10-CM

## 2021-06-14 DIAGNOSIS — K58.2 IRRITABLE BOWEL SYNDROME WITH BOTH CONSTIPATION AND DIARRHEA: ICD-10-CM

## 2021-06-14 DIAGNOSIS — R16.0 HEPATOMEGALY: ICD-10-CM

## 2021-06-14 DIAGNOSIS — K21.9 GASTROESOPHAGEAL REFLUX DISEASE, UNSPECIFIED WHETHER ESOPHAGITIS PRESENT: ICD-10-CM

## 2021-06-14 PROCEDURE — 99203 OFFICE O/P NEW LOW 30 MIN: CPT | Mod: S$GLB,,, | Performed by: PHYSICIAN ASSISTANT

## 2021-06-14 PROCEDURE — 1125F AMNT PAIN NOTED PAIN PRSNT: CPT | Mod: S$GLB,,, | Performed by: PHYSICIAN ASSISTANT

## 2021-06-14 PROCEDURE — 99999 PR PBB SHADOW E&M-EST. PATIENT-LVL IV: ICD-10-PCS | Mod: PBBFAC,,, | Performed by: PHYSICIAN ASSISTANT

## 2021-06-14 PROCEDURE — 1125F PR PAIN SEVERITY QUANTIFIED, PAIN PRESENT: ICD-10-PCS | Mod: S$GLB,,, | Performed by: PHYSICIAN ASSISTANT

## 2021-06-14 PROCEDURE — 3008F PR BODY MASS INDEX (BMI) DOCUMENTED: ICD-10-PCS | Mod: CPTII,S$GLB,, | Performed by: PHYSICIAN ASSISTANT

## 2021-06-14 PROCEDURE — 3008F BODY MASS INDEX DOCD: CPT | Mod: CPTII,S$GLB,, | Performed by: PHYSICIAN ASSISTANT

## 2021-06-14 PROCEDURE — 99203 PR OFFICE/OUTPT VISIT, NEW, LEVL III, 30-44 MIN: ICD-10-PCS | Mod: S$GLB,,, | Performed by: PHYSICIAN ASSISTANT

## 2021-06-14 PROCEDURE — 99999 PR PBB SHADOW E&M-EST. PATIENT-LVL IV: CPT | Mod: PBBFAC,,, | Performed by: PHYSICIAN ASSISTANT

## 2021-06-16 ENCOUNTER — LAB VISIT (OUTPATIENT)
Dept: LAB | Facility: HOSPITAL | Age: 41
End: 2021-06-16
Attending: FAMILY MEDICINE
Payer: COMMERCIAL

## 2021-06-16 DIAGNOSIS — R16.0 HEPATOMEGALY: ICD-10-CM

## 2021-06-16 DIAGNOSIS — R79.89 ELEVATED LIVER FUNCTION TESTS: ICD-10-CM

## 2021-06-16 LAB
CERULOPLASMIN SERPL-MCNC: 25 MG/DL (ref 15–45)
ESTIMATED AVG GLUCOSE: 123 MG/DL (ref 68–131)
FERRITIN SERPL-MCNC: 255 NG/ML (ref 20–300)
HBA1C MFR BLD: 5.9 % (ref 4–5.6)
IGA SERPL-MCNC: 215 MG/DL (ref 40–350)
IGG SERPL-MCNC: 1333 MG/DL (ref 650–1600)
IGM SERPL-MCNC: 142 MG/DL (ref 50–300)
INR PPP: 1 (ref 0.8–1.2)
IRON SERPL-MCNC: 75 UG/DL (ref 30–160)
PROTHROMBIN TIME: 10.8 SEC (ref 9–12.5)
SATURATED IRON: 20 % (ref 20–50)
TOTAL IRON BINDING CAPACITY: 374 UG/DL (ref 250–450)
TRANSFERRIN SERPL-MCNC: 253 MG/DL (ref 200–375)
TRIGL SERPL-MCNC: 141 MG/DL (ref 30–150)

## 2021-06-16 PROCEDURE — 85610 PROTHROMBIN TIME: CPT | Performed by: PHYSICIAN ASSISTANT

## 2021-06-16 PROCEDURE — 83540 ASSAY OF IRON: CPT | Performed by: PHYSICIAN ASSISTANT

## 2021-06-16 PROCEDURE — 82390 ASSAY OF CERULOPLASMIN: CPT | Performed by: PHYSICIAN ASSISTANT

## 2021-06-16 PROCEDURE — 82784 ASSAY IGA/IGD/IGG/IGM EACH: CPT | Mod: 59 | Performed by: PHYSICIAN ASSISTANT

## 2021-06-16 PROCEDURE — 82728 ASSAY OF FERRITIN: CPT | Performed by: PHYSICIAN ASSISTANT

## 2021-06-16 PROCEDURE — 83516 IMMUNOASSAY NONANTIBODY: CPT | Performed by: PHYSICIAN ASSISTANT

## 2021-06-16 PROCEDURE — 84478 ASSAY OF TRIGLYCERIDES: CPT | Performed by: PHYSICIAN ASSISTANT

## 2021-06-16 PROCEDURE — 86235 NUCLEAR ANTIGEN ANTIBODY: CPT | Performed by: PHYSICIAN ASSISTANT

## 2021-06-16 PROCEDURE — 83036 HEMOGLOBIN GLYCOSYLATED A1C: CPT | Performed by: PHYSICIAN ASSISTANT

## 2021-06-16 PROCEDURE — 86706 HEP B SURFACE ANTIBODY: CPT | Performed by: PHYSICIAN ASSISTANT

## 2021-06-17 ENCOUNTER — PATIENT MESSAGE (OUTPATIENT)
Dept: GASTROENTEROLOGY | Facility: CLINIC | Age: 41
End: 2021-06-17

## 2021-06-17 LAB — MITOCHONDRIA AB TITR SER IF: NORMAL {TITER}

## 2021-06-18 LAB — HBV SURFACE AB SER-ACNC: POSITIVE M[IU]/ML

## 2021-06-21 ENCOUNTER — PATIENT MESSAGE (OUTPATIENT)
Dept: GASTROENTEROLOGY | Facility: CLINIC | Age: 41
End: 2021-06-21

## 2021-06-21 ENCOUNTER — DOCUMENTATION ONLY (OUTPATIENT)
Dept: REHABILITATION | Facility: HOSPITAL | Age: 41
End: 2021-06-21

## 2021-06-21 LAB — TTG IGA SER-ACNC: 6 UNITS

## 2021-06-23 ENCOUNTER — PATIENT MESSAGE (OUTPATIENT)
Dept: PAIN MEDICINE | Facility: CLINIC | Age: 41
End: 2021-06-23

## 2021-06-23 ENCOUNTER — TELEPHONE (OUTPATIENT)
Dept: PAIN MEDICINE | Facility: CLINIC | Age: 41
End: 2021-06-23

## 2021-06-23 ENCOUNTER — OFFICE VISIT (OUTPATIENT)
Dept: PAIN MEDICINE | Facility: CLINIC | Age: 41
End: 2021-06-23
Payer: COMMERCIAL

## 2021-06-23 ENCOUNTER — TELEPHONE (OUTPATIENT)
Dept: GASTROENTEROLOGY | Facility: CLINIC | Age: 41
End: 2021-06-23

## 2021-06-23 ENCOUNTER — PATIENT MESSAGE (OUTPATIENT)
Dept: GASTROENTEROLOGY | Facility: CLINIC | Age: 41
End: 2021-06-23

## 2021-06-23 DIAGNOSIS — G89.29 CHRONIC MYOFASCIAL PAIN: Primary | ICD-10-CM

## 2021-06-23 DIAGNOSIS — M25.551 BILATERAL HIP PAIN: ICD-10-CM

## 2021-06-23 DIAGNOSIS — M54.16 LUMBAR RADICULOPATHY: ICD-10-CM

## 2021-06-23 DIAGNOSIS — M79.18 CHRONIC MYOFASCIAL PAIN: Primary | ICD-10-CM

## 2021-06-23 DIAGNOSIS — E66.01 MORBID OBESITY WITH BMI OF 40.0-44.9, ADULT: ICD-10-CM

## 2021-06-23 DIAGNOSIS — M79.12 MYALGIA OF AUXILIARY MUSCLES, HEAD AND NECK: ICD-10-CM

## 2021-06-23 DIAGNOSIS — R93.89 ABNORMAL ULTRASOUND: ICD-10-CM

## 2021-06-23 DIAGNOSIS — M25.562 CHRONIC PAIN OF BOTH KNEES: ICD-10-CM

## 2021-06-23 DIAGNOSIS — M25.561 CHRONIC PAIN OF BOTH KNEES: ICD-10-CM

## 2021-06-23 DIAGNOSIS — G89.29 CHRONIC PAIN OF BOTH KNEES: ICD-10-CM

## 2021-06-23 DIAGNOSIS — R79.89 ELEVATED LIVER FUNCTION TESTS: Primary | ICD-10-CM

## 2021-06-23 DIAGNOSIS — M47.816 LUMBAR SPONDYLOSIS: ICD-10-CM

## 2021-06-23 DIAGNOSIS — M25.552 BILATERAL HIP PAIN: ICD-10-CM

## 2021-06-23 PROCEDURE — 99213 OFFICE O/P EST LOW 20 MIN: CPT | Mod: 95,,, | Performed by: PHYSICAL MEDICINE & REHABILITATION

## 2021-06-23 PROCEDURE — 99213 PR OFFICE/OUTPT VISIT, EST, LEVL III, 20-29 MIN: ICD-10-PCS | Mod: 95,,, | Performed by: PHYSICAL MEDICINE & REHABILITATION

## 2021-06-23 RX ORDER — CYCLOBENZAPRINE HCL 10 MG
TABLET ORAL
Qty: 60 TABLET | Refills: 1 | Status: SHIPPED | OUTPATIENT
Start: 2021-06-23 | End: 2021-08-04 | Stop reason: SDUPTHER

## 2021-06-23 RX ORDER — GABAPENTIN 300 MG/1
600 CAPSULE ORAL 2 TIMES DAILY
Qty: 120 CAPSULE | Refills: 11 | Status: SHIPPED | OUTPATIENT
Start: 2021-06-23 | End: 2021-09-23 | Stop reason: DRUGHIGH

## 2021-06-23 RX ORDER — BACLOFEN 10 MG/1
5-10 TABLET ORAL 3 TIMES DAILY
Qty: 90 TABLET | Refills: 2 | Status: SHIPPED | OUTPATIENT
Start: 2021-06-23 | End: 2021-08-04

## 2021-06-23 RX ORDER — DULOXETIN HYDROCHLORIDE 30 MG/1
30 CAPSULE, DELAYED RELEASE ORAL DAILY
Qty: 30 CAPSULE | Refills: 11 | Status: SHIPPED | OUTPATIENT
Start: 2021-06-23 | End: 2021-08-04

## 2021-06-23 RX ORDER — KETOROLAC TROMETHAMINE 10 MG/1
10 TABLET, FILM COATED ORAL 2 TIMES DAILY PRN
Qty: 8 TABLET | Refills: 0 | Status: SHIPPED | OUTPATIENT
Start: 2021-06-23 | End: 2021-06-27

## 2021-06-28 ENCOUNTER — TELEPHONE (OUTPATIENT)
Dept: RADIOLOGY | Facility: HOSPITAL | Age: 41
End: 2021-06-28

## 2021-06-29 ENCOUNTER — HOSPITAL ENCOUNTER (OUTPATIENT)
Dept: RADIOLOGY | Facility: HOSPITAL | Age: 41
Discharge: HOME | End: 2021-06-29
Attending: PHYSICIAN ASSISTANT
Payer: COMMERCIAL

## 2021-06-29 DIAGNOSIS — R79.89 ELEVATED LIVER FUNCTION TESTS: ICD-10-CM

## 2021-06-29 DIAGNOSIS — R93.89 ABNORMAL ULTRASOUND: ICD-10-CM

## 2021-06-29 PROCEDURE — 74177 CT ABD & PELVIS W/CONTRAST: CPT | Mod: 26,,, | Performed by: RADIOLOGY

## 2021-06-29 PROCEDURE — 25500020 PHARM REV CODE 255: Performed by: PHYSICIAN ASSISTANT

## 2021-06-29 PROCEDURE — 74177 CT ABD & PELVIS W/CONTRAST: CPT | Mod: TC

## 2021-06-29 PROCEDURE — 74177 CT ABDOMEN PELVIS WITH CONTRAST: ICD-10-PCS | Mod: 26,,, | Performed by: RADIOLOGY

## 2021-06-29 RX ADMIN — IOHEXOL 100 ML: 350 INJECTION, SOLUTION INTRAVENOUS at 02:06

## 2021-07-01 ENCOUNTER — PATIENT MESSAGE (OUTPATIENT)
Dept: GASTROENTEROLOGY | Facility: CLINIC | Age: 41
End: 2021-07-01

## 2021-07-01 ENCOUNTER — PATIENT MESSAGE (OUTPATIENT)
Dept: INTERNAL MEDICINE | Facility: CLINIC | Age: 41
End: 2021-07-01

## 2021-07-06 ENCOUNTER — OFFICE VISIT (OUTPATIENT)
Dept: GASTROENTEROLOGY | Facility: CLINIC | Age: 41
End: 2021-07-06
Payer: COMMERCIAL

## 2021-07-06 ENCOUNTER — HOSPITAL ENCOUNTER (OUTPATIENT)
Dept: RADIOLOGY | Facility: HOSPITAL | Age: 41
Discharge: HOME | End: 2021-07-06
Attending: PHYSICIAN ASSISTANT
Payer: COMMERCIAL

## 2021-07-06 VITALS
BODY MASS INDEX: 39.68 KG/M2 | HEART RATE: 85 BPM | SYSTOLIC BLOOD PRESSURE: 134 MMHG | WEIGHT: 293 LBS | HEIGHT: 72 IN | OXYGEN SATURATION: 96 % | DIASTOLIC BLOOD PRESSURE: 100 MMHG

## 2021-07-06 DIAGNOSIS — R79.89 ELEVATED LIVER FUNCTION TESTS: Primary | ICD-10-CM

## 2021-07-06 DIAGNOSIS — R10.84 GENERALIZED ABDOMINAL PAIN: ICD-10-CM

## 2021-07-06 DIAGNOSIS — K58.2 IRRITABLE BOWEL SYNDROME WITH BOTH CONSTIPATION AND DIARRHEA: ICD-10-CM

## 2021-07-06 DIAGNOSIS — Z86.010 HISTORY OF COLON POLYPS: ICD-10-CM

## 2021-07-06 DIAGNOSIS — R10.11 RUQ PAIN: ICD-10-CM

## 2021-07-06 DIAGNOSIS — R14.0 ABDOMINAL DISTENTION: ICD-10-CM

## 2021-07-06 DIAGNOSIS — K76.0 FATTY LIVER: ICD-10-CM

## 2021-07-06 DIAGNOSIS — Z01.818 PRE-OP TESTING: ICD-10-CM

## 2021-07-06 DIAGNOSIS — K21.9 GASTROESOPHAGEAL REFLUX DISEASE, UNSPECIFIED WHETHER ESOPHAGITIS PRESENT: ICD-10-CM

## 2021-07-06 PROCEDURE — 3008F PR BODY MASS INDEX (BMI) DOCUMENTED: ICD-10-PCS | Mod: CPTII,S$GLB,, | Performed by: PHYSICIAN ASSISTANT

## 2021-07-06 PROCEDURE — 99215 OFFICE O/P EST HI 40 MIN: CPT | Mod: S$GLB,,, | Performed by: PHYSICIAN ASSISTANT

## 2021-07-06 PROCEDURE — 74019 RADEX ABDOMEN 2 VIEWS: CPT | Mod: 26,,, | Performed by: RADIOLOGY

## 2021-07-06 PROCEDURE — 3008F BODY MASS INDEX DOCD: CPT | Mod: CPTII,S$GLB,, | Performed by: PHYSICIAN ASSISTANT

## 2021-07-06 PROCEDURE — 99999 PR PBB SHADOW E&M-EST. PATIENT-LVL IV: CPT | Mod: PBBFAC,,, | Performed by: PHYSICIAN ASSISTANT

## 2021-07-06 PROCEDURE — 74019 RADEX ABDOMEN 2 VIEWS: CPT | Mod: TC

## 2021-07-06 PROCEDURE — 99215 PR OFFICE/OUTPT VISIT, EST, LEVL V, 40-54 MIN: ICD-10-PCS | Mod: S$GLB,,, | Performed by: PHYSICIAN ASSISTANT

## 2021-07-06 PROCEDURE — 99999 PR PBB SHADOW E&M-EST. PATIENT-LVL IV: ICD-10-PCS | Mod: PBBFAC,,, | Performed by: PHYSICIAN ASSISTANT

## 2021-07-06 PROCEDURE — 74019 XR ABDOMEN FLAT AND ERECT: ICD-10-PCS | Mod: 26,,, | Performed by: RADIOLOGY

## 2021-07-06 RX ORDER — SODIUM, POTASSIUM,MAG SULFATES 17.5-3.13G
SOLUTION, RECONSTITUTED, ORAL ORAL
Qty: 354 ML | Refills: 0 | Status: SHIPPED | OUTPATIENT
Start: 2021-07-06 | End: 2021-07-22

## 2021-07-07 ENCOUNTER — PATIENT MESSAGE (OUTPATIENT)
Dept: GASTROENTEROLOGY | Facility: CLINIC | Age: 41
End: 2021-07-07

## 2021-07-09 ENCOUNTER — PATIENT MESSAGE (OUTPATIENT)
Dept: PAIN MEDICINE | Facility: CLINIC | Age: 41
End: 2021-07-09

## 2021-07-14 ENCOUNTER — PATIENT MESSAGE (OUTPATIENT)
Dept: INTERNAL MEDICINE | Facility: CLINIC | Age: 41
End: 2021-07-14

## 2021-07-14 DIAGNOSIS — Z12.31 OTHER SCREENING MAMMOGRAM: ICD-10-CM

## 2021-07-16 ENCOUNTER — TELEPHONE (OUTPATIENT)
Dept: GASTROENTEROLOGY | Facility: CLINIC | Age: 41
End: 2021-07-16

## 2021-07-16 DIAGNOSIS — R10.11 RUQ PAIN: Primary | ICD-10-CM

## 2021-07-16 DIAGNOSIS — R93.5 ABNORMAL ABDOMINAL CT SCAN: ICD-10-CM

## 2021-07-19 ENCOUNTER — TELEPHONE (OUTPATIENT)
Dept: GASTROENTEROLOGY | Facility: CLINIC | Age: 41
End: 2021-07-19

## 2021-07-19 ENCOUNTER — PATIENT MESSAGE (OUTPATIENT)
Dept: GASTROENTEROLOGY | Facility: CLINIC | Age: 41
End: 2021-07-19

## 2021-07-19 DIAGNOSIS — R93.5 ABNORMAL ABDOMINAL CT SCAN: Primary | ICD-10-CM

## 2021-07-20 ENCOUNTER — PATIENT MESSAGE (OUTPATIENT)
Dept: INTERNAL MEDICINE | Facility: CLINIC | Age: 41
End: 2021-07-20

## 2021-07-21 ENCOUNTER — PATIENT MESSAGE (OUTPATIENT)
Dept: INTERNAL MEDICINE | Facility: CLINIC | Age: 41
End: 2021-07-21

## 2021-07-23 ENCOUNTER — PATIENT MESSAGE (OUTPATIENT)
Dept: INTERNAL MEDICINE | Facility: CLINIC | Age: 41
End: 2021-07-23

## 2021-07-26 ENCOUNTER — TELEPHONE (OUTPATIENT)
Dept: ENDOSCOPY | Facility: HOSPITAL | Age: 41
End: 2021-07-26

## 2021-07-30 RX ORDER — SERTRALINE HYDROCHLORIDE 50 MG/1
50 TABLET, FILM COATED ORAL DAILY
COMMUNITY
Start: 2021-05-17 | End: 2021-08-04

## 2021-08-03 ENCOUNTER — PATIENT MESSAGE (OUTPATIENT)
Dept: PAIN MEDICINE | Facility: CLINIC | Age: 41
End: 2021-08-03

## 2021-08-03 ENCOUNTER — TELEPHONE (OUTPATIENT)
Dept: PAIN MEDICINE | Facility: CLINIC | Age: 41
End: 2021-08-03

## 2021-08-03 RX ORDER — MONTELUKAST SODIUM 10 MG/1
10 TABLET ORAL DAILY
COMMUNITY
Start: 2021-04-30 | End: 2021-08-04

## 2021-08-03 RX ORDER — SERTRALINE HYDROCHLORIDE 100 MG/1
100 TABLET, FILM COATED ORAL DAILY
COMMUNITY
Start: 2021-08-01 | End: 2021-08-04 | Stop reason: SDUPTHER

## 2021-08-04 ENCOUNTER — OFFICE VISIT (OUTPATIENT)
Dept: INTERNAL MEDICINE | Facility: CLINIC | Age: 41
End: 2021-08-04
Payer: COMMERCIAL

## 2021-08-04 ENCOUNTER — OFFICE VISIT (OUTPATIENT)
Dept: PAIN MEDICINE | Facility: CLINIC | Age: 41
End: 2021-08-04
Payer: COMMERCIAL

## 2021-08-04 ENCOUNTER — TELEPHONE (OUTPATIENT)
Dept: PAIN MEDICINE | Facility: CLINIC | Age: 41
End: 2021-08-04

## 2021-08-04 DIAGNOSIS — M79.12 MYALGIA OF AUXILIARY MUSCLES, HEAD AND NECK: ICD-10-CM

## 2021-08-04 DIAGNOSIS — M54.2 NECK PAIN: ICD-10-CM

## 2021-08-04 DIAGNOSIS — F32.A ANXIETY AND DEPRESSION: Primary | ICD-10-CM

## 2021-08-04 DIAGNOSIS — M25.561 CHRONIC PAIN OF BOTH KNEES: ICD-10-CM

## 2021-08-04 DIAGNOSIS — F41.9 ANXIETY AND DEPRESSION: Primary | ICD-10-CM

## 2021-08-04 DIAGNOSIS — G89.29 CHRONIC PAIN OF BOTH KNEES: ICD-10-CM

## 2021-08-04 DIAGNOSIS — M47.816 LUMBAR SPONDYLOSIS: ICD-10-CM

## 2021-08-04 DIAGNOSIS — M25.562 CHRONIC PAIN OF BOTH KNEES: ICD-10-CM

## 2021-08-04 DIAGNOSIS — E66.01 MORBID OBESITY WITH BMI OF 40.0-44.9, ADULT: ICD-10-CM

## 2021-08-04 DIAGNOSIS — M53.82 DECREASED ROM OF INTERVERTEBRAL DISCS OF CERVICAL SPINE: ICD-10-CM

## 2021-08-04 DIAGNOSIS — R79.89 ELEVATED LIVER FUNCTION TESTS: ICD-10-CM

## 2021-08-04 DIAGNOSIS — G89.29 CHRONIC MYOFASCIAL PAIN: Primary | ICD-10-CM

## 2021-08-04 DIAGNOSIS — M79.18 CHRONIC MYOFASCIAL PAIN: Primary | ICD-10-CM

## 2021-08-04 DIAGNOSIS — M54.16 LUMBAR RADICULOPATHY: ICD-10-CM

## 2021-08-04 PROCEDURE — 3044F PR MOST RECENT HEMOGLOBIN A1C LEVEL <7.0%: ICD-10-PCS | Mod: CPTII,95,, | Performed by: PHYSICAL MEDICINE & REHABILITATION

## 2021-08-04 PROCEDURE — 1159F PR MEDICATION LIST DOCUMENTED IN MEDICAL RECORD: ICD-10-PCS | Mod: CPTII,95,, | Performed by: PHYSICAL MEDICINE & REHABILITATION

## 2021-08-04 PROCEDURE — 99213 PR OFFICE/OUTPT VISIT, EST, LEVL III, 20-29 MIN: ICD-10-PCS | Mod: 95,,, | Performed by: PHYSICAL MEDICINE & REHABILITATION

## 2021-08-04 PROCEDURE — 3044F HG A1C LEVEL LT 7.0%: CPT | Mod: CPTII,95,, | Performed by: PHYSICAL MEDICINE & REHABILITATION

## 2021-08-04 PROCEDURE — 1160F PR REVIEW ALL MEDS BY PRESCRIBER/CLIN PHARMACIST DOCUMENTED: ICD-10-PCS | Mod: CPTII,95,, | Performed by: PHYSICAL MEDICINE & REHABILITATION

## 2021-08-04 PROCEDURE — 1160F RVW MEDS BY RX/DR IN RCRD: CPT | Mod: CPTII,95,, | Performed by: PHYSICAL MEDICINE & REHABILITATION

## 2021-08-04 PROCEDURE — 99213 PR OFFICE/OUTPT VISIT, EST, LEVL III, 20-29 MIN: ICD-10-PCS | Mod: 95,,, | Performed by: FAMILY MEDICINE

## 2021-08-04 PROCEDURE — 99213 OFFICE O/P EST LOW 20 MIN: CPT | Mod: 95,,, | Performed by: PHYSICAL MEDICINE & REHABILITATION

## 2021-08-04 PROCEDURE — 99213 OFFICE O/P EST LOW 20 MIN: CPT | Mod: 95,,, | Performed by: FAMILY MEDICINE

## 2021-08-04 PROCEDURE — 1159F MED LIST DOCD IN RCRD: CPT | Mod: CPTII,95,, | Performed by: PHYSICAL MEDICINE & REHABILITATION

## 2021-08-04 RX ORDER — CYCLOBENZAPRINE HCL 10 MG
TABLET ORAL
Qty: 60 TABLET | Refills: 1 | Status: SHIPPED | OUTPATIENT
Start: 2021-08-04 | End: 2021-08-16 | Stop reason: ALTCHOICE

## 2021-08-04 RX ORDER — MELOXICAM 7.5 MG/1
7.5 TABLET ORAL 2 TIMES DAILY PRN
Qty: 60 TABLET | Refills: 1 | Status: SHIPPED | OUTPATIENT
Start: 2021-08-04 | End: 2021-09-03

## 2021-08-04 RX ORDER — SERTRALINE HYDROCHLORIDE 100 MG/1
100 TABLET, FILM COATED ORAL DAILY
Qty: 90 TABLET | Refills: 1 | Status: SHIPPED | OUTPATIENT
Start: 2021-08-04 | End: 2021-08-04

## 2021-08-04 RX ORDER — DULOXETIN HYDROCHLORIDE 60 MG/1
60 CAPSULE, DELAYED RELEASE ORAL NIGHTLY
Qty: 30 CAPSULE | Refills: 2 | Status: SHIPPED | OUTPATIENT
Start: 2021-08-04 | End: 2021-11-22

## 2021-08-04 RX ORDER — SERTRALINE HYDROCHLORIDE 100 MG/1
100 TABLET, FILM COATED ORAL DAILY
Qty: 90 TABLET | Refills: 0 | OUTPATIENT
Start: 2021-08-04

## 2021-08-16 ENCOUNTER — PATIENT MESSAGE (OUTPATIENT)
Dept: PAIN MEDICINE | Facility: CLINIC | Age: 41
End: 2021-08-16

## 2021-08-16 RX ORDER — TIZANIDINE 4 MG/1
2-4 TABLET ORAL EVERY 8 HOURS PRN
Qty: 90 TABLET | Refills: 0 | Status: SHIPPED | OUTPATIENT
Start: 2021-08-16 | End: 2021-09-15

## 2021-08-18 ENCOUNTER — TELEPHONE (OUTPATIENT)
Dept: GASTROENTEROLOGY | Facility: CLINIC | Age: 41
End: 2021-08-18

## 2021-08-27 ENCOUNTER — PATIENT MESSAGE (OUTPATIENT)
Dept: INTERNAL MEDICINE | Facility: CLINIC | Age: 41
End: 2021-08-27

## 2021-08-27 ENCOUNTER — OFFICE VISIT (OUTPATIENT)
Dept: INTERNAL MEDICINE | Facility: CLINIC | Age: 41
End: 2021-08-27
Payer: COMMERCIAL

## 2021-08-27 DIAGNOSIS — E66.01 MORBID OBESITY: Primary | ICD-10-CM

## 2021-08-27 DIAGNOSIS — R73.03 PREDIABETES: ICD-10-CM

## 2021-08-27 DIAGNOSIS — M17.10 ARTHRITIS OF KNEE: ICD-10-CM

## 2021-08-27 PROCEDURE — 3044F HG A1C LEVEL LT 7.0%: CPT | Mod: CPTII,95,, | Performed by: FAMILY MEDICINE

## 2021-08-27 PROCEDURE — 99214 PR OFFICE/OUTPT VISIT, EST, LEVL IV, 30-39 MIN: ICD-10-PCS | Mod: 95,,, | Performed by: FAMILY MEDICINE

## 2021-08-27 PROCEDURE — 3044F PR MOST RECENT HEMOGLOBIN A1C LEVEL <7.0%: ICD-10-PCS | Mod: CPTII,95,, | Performed by: FAMILY MEDICINE

## 2021-08-27 PROCEDURE — 99214 OFFICE O/P EST MOD 30 MIN: CPT | Mod: 95,,, | Performed by: FAMILY MEDICINE

## 2021-09-14 ENCOUNTER — PATIENT MESSAGE (OUTPATIENT)
Dept: ENDOSCOPY | Facility: HOSPITAL | Age: 41
End: 2021-09-14

## 2021-09-14 ENCOUNTER — TELEPHONE (OUTPATIENT)
Dept: ENDOSCOPY | Facility: HOSPITAL | Age: 41
End: 2021-09-14

## 2021-09-14 DIAGNOSIS — Z01.818 PRE-OP TESTING: ICD-10-CM

## 2021-09-21 ENCOUNTER — TELEPHONE (OUTPATIENT)
Dept: PAIN MEDICINE | Facility: CLINIC | Age: 41
End: 2021-09-21

## 2021-09-23 ENCOUNTER — OFFICE VISIT (OUTPATIENT)
Dept: PAIN MEDICINE | Facility: CLINIC | Age: 41
End: 2021-09-23
Payer: COMMERCIAL

## 2021-09-23 ENCOUNTER — PATIENT MESSAGE (OUTPATIENT)
Dept: PAIN MEDICINE | Facility: CLINIC | Age: 41
End: 2021-09-23

## 2021-09-23 VITALS — RESPIRATION RATE: 17 BRPM | HEIGHT: 72 IN | BODY MASS INDEX: 39.68 KG/M2 | WEIGHT: 293 LBS

## 2021-09-23 DIAGNOSIS — M25.561 CHRONIC PAIN OF BOTH KNEES: ICD-10-CM

## 2021-09-23 DIAGNOSIS — G89.29 CHRONIC MYOFASCIAL PAIN: Primary | ICD-10-CM

## 2021-09-23 DIAGNOSIS — G89.29 CHRONIC PAIN OF BOTH KNEES: ICD-10-CM

## 2021-09-23 DIAGNOSIS — M54.12 CERVICAL RADICULOPATHY: ICD-10-CM

## 2021-09-23 DIAGNOSIS — M79.12 MYALGIA OF AUXILIARY MUSCLES, HEAD AND NECK: ICD-10-CM

## 2021-09-23 DIAGNOSIS — M25.562 CHRONIC PAIN OF BOTH KNEES: ICD-10-CM

## 2021-09-23 DIAGNOSIS — M79.18 CHRONIC MYOFASCIAL PAIN: Primary | ICD-10-CM

## 2021-09-23 PROCEDURE — 1159F MED LIST DOCD IN RCRD: CPT | Mod: CPTII,95,, | Performed by: PHYSICIAN ASSISTANT

## 2021-09-23 PROCEDURE — 3008F PR BODY MASS INDEX (BMI) DOCUMENTED: ICD-10-PCS | Mod: CPTII,95,, | Performed by: PHYSICIAN ASSISTANT

## 2021-09-23 PROCEDURE — 99214 PR OFFICE/OUTPT VISIT, EST, LEVL IV, 30-39 MIN: ICD-10-PCS | Mod: 95,,, | Performed by: PHYSICIAN ASSISTANT

## 2021-09-23 PROCEDURE — 1159F PR MEDICATION LIST DOCUMENTED IN MEDICAL RECORD: ICD-10-PCS | Mod: CPTII,95,, | Performed by: PHYSICIAN ASSISTANT

## 2021-09-23 PROCEDURE — 1160F PR REVIEW ALL MEDS BY PRESCRIBER/CLIN PHARMACIST DOCUMENTED: ICD-10-PCS | Mod: CPTII,95,, | Performed by: PHYSICIAN ASSISTANT

## 2021-09-23 PROCEDURE — 99214 OFFICE O/P EST MOD 30 MIN: CPT | Mod: 95,,, | Performed by: PHYSICIAN ASSISTANT

## 2021-09-23 PROCEDURE — 3044F HG A1C LEVEL LT 7.0%: CPT | Mod: CPTII,95,, | Performed by: PHYSICIAN ASSISTANT

## 2021-09-23 PROCEDURE — 1160F RVW MEDS BY RX/DR IN RCRD: CPT | Mod: CPTII,95,, | Performed by: PHYSICIAN ASSISTANT

## 2021-09-23 PROCEDURE — 3044F PR MOST RECENT HEMOGLOBIN A1C LEVEL <7.0%: ICD-10-PCS | Mod: CPTII,95,, | Performed by: PHYSICIAN ASSISTANT

## 2021-09-23 PROCEDURE — 3008F BODY MASS INDEX DOCD: CPT | Mod: CPTII,95,, | Performed by: PHYSICIAN ASSISTANT

## 2021-09-23 RX ORDER — GABAPENTIN 600 MG/1
600 TABLET ORAL 3 TIMES DAILY
Qty: 90 TABLET | Refills: 1 | Status: SHIPPED | OUTPATIENT
Start: 2021-09-23 | End: 2022-01-12 | Stop reason: ALTCHOICE

## 2021-09-23 RX ORDER — CHLORZOXAZONE 500 MG/1
500 TABLET ORAL 2 TIMES DAILY PRN
Qty: 60 TABLET | Refills: 0 | Status: SHIPPED | OUTPATIENT
Start: 2021-09-23 | End: 2021-10-23

## 2021-09-24 RX ORDER — METHYLPREDNISOLONE 4 MG/1
TABLET ORAL
Qty: 1 PACKAGE | Refills: 0 | Status: SHIPPED | OUTPATIENT
Start: 2021-09-24 | End: 2021-11-15

## 2021-10-05 ENCOUNTER — PATIENT MESSAGE (OUTPATIENT)
Dept: INTERNAL MEDICINE | Facility: CLINIC | Age: 41
End: 2021-10-05

## 2021-10-29 ENCOUNTER — HOSPITAL ENCOUNTER (EMERGENCY)
Facility: HOSPITAL | Age: 41
Discharge: HOME | End: 2021-10-30
Attending: FAMILY MEDICINE
Payer: COMMERCIAL

## 2021-10-29 DIAGNOSIS — B34.9 VIRAL ILLNESS: Primary | ICD-10-CM

## 2021-10-29 LAB
ALBUMIN SERPL BCP-MCNC: 3.7 G/DL (ref 3.5–5.2)
ALP SERPL-CCNC: 73 U/L (ref 55–135)
ALT SERPL W/O P-5'-P-CCNC: 97 U/L (ref 10–44)
ANION GAP SERPL CALC-SCNC: 8 MMOL/L (ref 8–16)
AST SERPL-CCNC: 98 U/L (ref 10–40)
BASOPHILS # BLD AUTO: 0.07 K/UL (ref 0–0.2)
BASOPHILS NFR BLD: 1.3 % (ref 0–1.9)
BILIRUB SERPL-MCNC: 1.1 MG/DL (ref 0.1–1)
BUN SERPL-MCNC: 8 MG/DL (ref 6–20)
CALCIUM SERPL-MCNC: 10.3 MG/DL (ref 8.7–10.5)
CHLORIDE SERPL-SCNC: 107 MMOL/L (ref 95–110)
CO2 SERPL-SCNC: 25 MMOL/L (ref 23–29)
CREAT SERPL-MCNC: 0.8 MG/DL (ref 0.5–1.4)
CTP QC/QA: YES
DIFFERENTIAL METHOD: ABNORMAL
EOSINOPHIL # BLD AUTO: 0.3 K/UL (ref 0–0.5)
EOSINOPHIL NFR BLD: 4.9 % (ref 0–8)
ERYTHROCYTE [DISTWIDTH] IN BLOOD BY AUTOMATED COUNT: 13.2 % (ref 11.5–14.5)
EST. GFR  (AFRICAN AMERICAN): >60 ML/MIN/1.73 M^2
EST. GFR  (NON AFRICAN AMERICAN): >60 ML/MIN/1.73 M^2
GLUCOSE SERPL-MCNC: 116 MG/DL (ref 70–110)
HCT VFR BLD AUTO: 44.2 % (ref 37–48.5)
HGB BLD-MCNC: 14.5 G/DL (ref 12–16)
IMM GRANULOCYTES # BLD AUTO: 0.01 K/UL (ref 0–0.04)
IMM GRANULOCYTES NFR BLD AUTO: 0.2 % (ref 0–0.5)
LIPASE SERPL-CCNC: 46 U/L (ref 4–60)
LYMPHOCYTES # BLD AUTO: 2.1 K/UL (ref 1–4.8)
LYMPHOCYTES NFR BLD: 38.4 % (ref 18–48)
MCH RBC QN AUTO: 29.4 PG (ref 27–31)
MCHC RBC AUTO-ENTMCNC: 32.8 G/DL (ref 32–36)
MCV RBC AUTO: 90 FL (ref 82–98)
MONOCYTES # BLD AUTO: 0.5 K/UL (ref 0.3–1)
MONOCYTES NFR BLD: 9.6 % (ref 4–15)
NEUTROPHILS # BLD AUTO: 2.4 K/UL (ref 1.8–7.7)
NEUTROPHILS NFR BLD: 45.6 % (ref 38–73)
NRBC BLD-RTO: 0 /100 WBC
PLATELET # BLD AUTO: 270 K/UL (ref 150–450)
PMV BLD AUTO: 9.1 FL (ref 9.2–12.9)
POTASSIUM SERPL-SCNC: 3.8 MMOL/L (ref 3.5–5.1)
PROT SERPL-MCNC: 8 G/DL (ref 6–8.4)
RBC # BLD AUTO: 4.94 M/UL (ref 4–5.4)
SARS-COV-2 RDRP RESP QL NAA+PROBE: NEGATIVE
SODIUM SERPL-SCNC: 140 MMOL/L (ref 136–145)
WBC # BLD AUTO: 5.34 K/UL (ref 3.9–12.7)

## 2021-10-29 PROCEDURE — U0002 COVID-19 LAB TEST NON-CDC: HCPCS | Performed by: NURSE PRACTITIONER

## 2021-10-29 PROCEDURE — 99284 EMERGENCY DEPT VISIT MOD MDM: CPT | Mod: 25

## 2021-10-29 PROCEDURE — 25000003 PHARM REV CODE 250: Performed by: FAMILY MEDICINE

## 2021-10-29 PROCEDURE — 96375 TX/PRO/DX INJ NEW DRUG ADDON: CPT

## 2021-10-29 PROCEDURE — 85025 COMPLETE CBC W/AUTO DIFF WBC: CPT | Performed by: NURSE PRACTITIONER

## 2021-10-29 PROCEDURE — 63600175 PHARM REV CODE 636 W HCPCS: Performed by: FAMILY MEDICINE

## 2021-10-29 PROCEDURE — 80053 COMPREHEN METABOLIC PANEL: CPT | Performed by: NURSE PRACTITIONER

## 2021-10-29 PROCEDURE — 96361 HYDRATE IV INFUSION ADD-ON: CPT

## 2021-10-29 PROCEDURE — 96374 THER/PROPH/DIAG INJ IV PUSH: CPT

## 2021-10-29 PROCEDURE — 83690 ASSAY OF LIPASE: CPT | Performed by: FAMILY MEDICINE

## 2021-10-29 RX ORDER — ONDANSETRON 2 MG/ML
4 INJECTION INTRAMUSCULAR; INTRAVENOUS
Status: COMPLETED | OUTPATIENT
Start: 2021-10-29 | End: 2021-10-29

## 2021-10-29 RX ORDER — DICYCLOMINE HYDROCHLORIDE 10 MG/ML
20 INJECTION INTRAMUSCULAR
Status: COMPLETED | OUTPATIENT
Start: 2021-10-29 | End: 2021-10-29

## 2021-10-29 RX ORDER — ONDANSETRON 2 MG/ML
4 INJECTION INTRAMUSCULAR; INTRAVENOUS
Status: DISCONTINUED | OUTPATIENT
Start: 2021-10-29 | End: 2021-10-29

## 2021-10-29 RX ADMIN — DICYCLOMINE HYDROCHLORIDE 20 MG: 20 INJECTION INTRAMUSCULAR at 11:10

## 2021-10-29 RX ADMIN — SODIUM CHLORIDE 1000 ML: 0.9 INJECTION, SOLUTION INTRAVENOUS at 10:10

## 2021-10-29 RX ADMIN — ONDANSETRON 4 MG: 2 INJECTION INTRAMUSCULAR; INTRAVENOUS at 10:10

## 2021-10-30 VITALS
BODY MASS INDEX: 39.68 KG/M2 | DIASTOLIC BLOOD PRESSURE: 56 MMHG | HEART RATE: 68 BPM | WEIGHT: 293 LBS | RESPIRATION RATE: 20 BRPM | TEMPERATURE: 98 F | HEIGHT: 72 IN | SYSTOLIC BLOOD PRESSURE: 121 MMHG | OXYGEN SATURATION: 94 %

## 2021-10-30 LAB
B-HCG UR QL: NEGATIVE
BILIRUB UR QL STRIP: NEGATIVE
CLARITY UR: CLEAR
COLOR UR: YELLOW
GLUCOSE UR QL STRIP: NEGATIVE
HGB UR QL STRIP: NEGATIVE
KETONES UR QL STRIP: ABNORMAL
LEUKOCYTE ESTERASE UR QL STRIP: NEGATIVE
NITRITE UR QL STRIP: NEGATIVE
PH UR STRIP: 6 [PH] (ref 5–8)
PROT UR QL STRIP: NEGATIVE
SP GR UR STRIP: >=1.03 (ref 1–1.03)
URN SPEC COLLECT METH UR: ABNORMAL
UROBILINOGEN UR STRIP-ACNC: NEGATIVE EU/DL

## 2021-10-30 PROCEDURE — 81003 URINALYSIS AUTO W/O SCOPE: CPT | Performed by: NURSE PRACTITIONER

## 2021-10-30 PROCEDURE — 81025 URINE PREGNANCY TEST: CPT | Performed by: FAMILY MEDICINE

## 2021-10-30 RX ORDER — ONDANSETRON 4 MG/1
8 TABLET, ORALLY DISINTEGRATING ORAL EVERY 8 HOURS PRN
Qty: 12 TABLET | Refills: 0 | Status: SHIPPED | OUTPATIENT
Start: 2021-10-30 | End: 2021-11-04

## 2021-10-30 RX ORDER — DICYCLOMINE HYDROCHLORIDE 20 MG/1
20 TABLET ORAL 2 TIMES DAILY
Qty: 20 TABLET | Refills: 0 | Status: SHIPPED | OUTPATIENT
Start: 2021-10-30 | End: 2021-11-29

## 2021-10-30 RX ORDER — DEXTROMETHORPHAN HYDROBROMIDE, GUAIFENESIN 5; 100 MG/5ML; MG/5ML
650 LIQUID ORAL EVERY 8 HOURS
Qty: 15 TABLET | Refills: 0 | Status: SHIPPED | OUTPATIENT
Start: 2021-10-30 | End: 2021-11-04

## 2021-10-30 RX ORDER — MORPHINE SULFATE 4 MG/ML
4 INJECTION, SOLUTION INTRAMUSCULAR; INTRAVENOUS
Status: DISCONTINUED | OUTPATIENT
Start: 2021-10-30 | End: 2021-10-30

## 2021-10-30 RX ORDER — ONDANSETRON 2 MG/ML
4 INJECTION INTRAMUSCULAR; INTRAVENOUS
Status: DISCONTINUED | OUTPATIENT
Start: 2021-10-30 | End: 2021-10-30

## 2021-11-03 ENCOUNTER — PATIENT MESSAGE (OUTPATIENT)
Dept: PSYCHIATRY | Facility: CLINIC | Age: 41
End: 2021-11-03
Payer: COMMERCIAL

## 2021-11-05 ENCOUNTER — OFFICE VISIT (OUTPATIENT)
Dept: INTERNAL MEDICINE | Facility: CLINIC | Age: 41
End: 2021-11-05
Payer: COMMERCIAL

## 2021-11-05 DIAGNOSIS — K21.9 GASTROESOPHAGEAL REFLUX DISEASE, UNSPECIFIED WHETHER ESOPHAGITIS PRESENT: ICD-10-CM

## 2021-11-05 DIAGNOSIS — J06.9 URTI (ACUTE UPPER RESPIRATORY INFECTION): Primary | ICD-10-CM

## 2021-11-05 PROCEDURE — 99214 PR OFFICE/OUTPT VISIT, EST, LEVL IV, 30-39 MIN: ICD-10-PCS | Mod: 95,,, | Performed by: FAMILY MEDICINE

## 2021-11-05 PROCEDURE — 3044F HG A1C LEVEL LT 7.0%: CPT | Mod: CPTII,95,, | Performed by: FAMILY MEDICINE

## 2021-11-05 PROCEDURE — 99214 OFFICE O/P EST MOD 30 MIN: CPT | Mod: 95,,, | Performed by: FAMILY MEDICINE

## 2021-11-05 PROCEDURE — 3044F PR MOST RECENT HEMOGLOBIN A1C LEVEL <7.0%: ICD-10-PCS | Mod: CPTII,95,, | Performed by: FAMILY MEDICINE

## 2021-11-05 RX ORDER — FLUTICASONE PROPIONATE 50 MCG
1 SPRAY, SUSPENSION (ML) NASAL DAILY
Qty: 16 G | Refills: 0 | Status: SHIPPED | OUTPATIENT
Start: 2021-11-05 | End: 2022-06-20

## 2021-11-05 RX ORDER — AZELASTINE 1 MG/ML
1 SPRAY, METERED NASAL 2 TIMES DAILY
Qty: 30 ML | Refills: 0 | Status: SHIPPED | OUTPATIENT
Start: 2021-11-05 | End: 2022-06-20

## 2021-11-05 RX ORDER — MONTELUKAST SODIUM 10 MG/1
10 TABLET ORAL NIGHTLY
Qty: 30 TABLET | Refills: 0 | Status: SHIPPED | OUTPATIENT
Start: 2021-11-05 | End: 2021-12-05

## 2021-11-08 ENCOUNTER — HOSPITAL ENCOUNTER (OUTPATIENT)
Dept: RADIOLOGY | Facility: HOSPITAL | Age: 41
Discharge: HOME | End: 2021-11-08
Attending: FAMILY MEDICINE
Payer: COMMERCIAL

## 2021-11-08 DIAGNOSIS — J06.9 URTI (ACUTE UPPER RESPIRATORY INFECTION): ICD-10-CM

## 2021-11-08 PROCEDURE — 71046 X-RAY EXAM CHEST 2 VIEWS: CPT | Mod: TC

## 2021-11-08 PROCEDURE — 71046 XR CHEST PA AND LATERAL: ICD-10-PCS | Mod: 26,,, | Performed by: RADIOLOGY

## 2021-11-08 PROCEDURE — 71046 X-RAY EXAM CHEST 2 VIEWS: CPT | Mod: 26,,, | Performed by: RADIOLOGY

## 2021-11-15 ENCOUNTER — OFFICE VISIT (OUTPATIENT)
Dept: PSYCHIATRY | Facility: CLINIC | Age: 41
End: 2021-11-15
Payer: COMMERCIAL

## 2021-11-15 DIAGNOSIS — F90.2 ADHD (ATTENTION DEFICIT HYPERACTIVITY DISORDER), COMBINED TYPE: Primary | ICD-10-CM

## 2021-11-15 PROCEDURE — 96127 PR BRIEF EMOTIONAL/BEHAV ASSMT: ICD-10-PCS | Mod: 95,,, | Performed by: NURSE PRACTITIONER

## 2021-11-15 PROCEDURE — 1160F RVW MEDS BY RX/DR IN RCRD: CPT | Mod: CPTII,95,, | Performed by: NURSE PRACTITIONER

## 2021-11-15 PROCEDURE — 99214 PR OFFICE/OUTPT VISIT, EST, LEVL IV, 30-39 MIN: ICD-10-PCS | Mod: 95,,, | Performed by: NURSE PRACTITIONER

## 2021-11-15 PROCEDURE — 1160F PR REVIEW ALL MEDS BY PRESCRIBER/CLIN PHARMACIST DOCUMENTED: ICD-10-PCS | Mod: CPTII,95,, | Performed by: NURSE PRACTITIONER

## 2021-11-15 PROCEDURE — 1159F PR MEDICATION LIST DOCUMENTED IN MEDICAL RECORD: ICD-10-PCS | Mod: CPTII,95,, | Performed by: NURSE PRACTITIONER

## 2021-11-15 PROCEDURE — 96127 BRIEF EMOTIONAL/BEHAV ASSMT: CPT | Mod: 95,,, | Performed by: NURSE PRACTITIONER

## 2021-11-15 PROCEDURE — 3044F HG A1C LEVEL LT 7.0%: CPT | Mod: CPTII,95,, | Performed by: NURSE PRACTITIONER

## 2021-11-15 PROCEDURE — 1159F MED LIST DOCD IN RCRD: CPT | Mod: CPTII,95,, | Performed by: NURSE PRACTITIONER

## 2021-11-15 PROCEDURE — 3044F PR MOST RECENT HEMOGLOBIN A1C LEVEL <7.0%: ICD-10-PCS | Mod: CPTII,95,, | Performed by: NURSE PRACTITIONER

## 2021-11-15 PROCEDURE — 99214 OFFICE O/P EST MOD 30 MIN: CPT | Mod: 95,,, | Performed by: NURSE PRACTITIONER

## 2021-11-15 RX ORDER — LISDEXAMFETAMINE DIMESYLATE CAPSULES 10 MG/1
10 CAPSULE ORAL DAILY
Qty: 14 CAPSULE | Refills: 0 | Status: SHIPPED | OUTPATIENT
Start: 2021-11-15 | End: 2021-11-29

## 2021-11-19 ENCOUNTER — PATIENT OUTREACH (OUTPATIENT)
Dept: ADMINISTRATIVE | Facility: OTHER | Age: 41
End: 2021-11-19
Payer: COMMERCIAL

## 2021-11-22 ENCOUNTER — TELEPHONE (OUTPATIENT)
Dept: PAIN MEDICINE | Facility: CLINIC | Age: 41
End: 2021-11-22
Payer: COMMERCIAL

## 2021-11-22 ENCOUNTER — OFFICE VISIT (OUTPATIENT)
Dept: INTERNAL MEDICINE | Facility: CLINIC | Age: 41
End: 2021-11-22
Payer: COMMERCIAL

## 2021-11-22 VITALS
TEMPERATURE: 97 F | BODY MASS INDEX: 47.81 KG/M2 | OXYGEN SATURATION: 95 % | HEART RATE: 103 BPM | WEIGHT: 293 LBS | DIASTOLIC BLOOD PRESSURE: 82 MMHG | RESPIRATION RATE: 18 BRPM | SYSTOLIC BLOOD PRESSURE: 120 MMHG

## 2021-11-22 DIAGNOSIS — R73.03 PREDIABETES: Primary | ICD-10-CM

## 2021-11-22 DIAGNOSIS — J30.9 ALLERGIC RHINITIS, UNSPECIFIED SEASONALITY, UNSPECIFIED TRIGGER: ICD-10-CM

## 2021-11-22 DIAGNOSIS — R79.89 ELEVATED LFTS: ICD-10-CM

## 2021-11-22 DIAGNOSIS — K21.9 GASTROESOPHAGEAL REFLUX DISEASE, UNSPECIFIED WHETHER ESOPHAGITIS PRESENT: ICD-10-CM

## 2021-11-22 DIAGNOSIS — F90.9 ATTENTION DEFICIT HYPERACTIVITY DISORDER (ADHD), UNSPECIFIED ADHD TYPE: ICD-10-CM

## 2021-11-22 PROCEDURE — 99214 PR OFFICE/OUTPT VISIT, EST, LEVL IV, 30-39 MIN: ICD-10-PCS | Mod: S$GLB,,, | Performed by: FAMILY MEDICINE

## 2021-11-22 PROCEDURE — 99214 OFFICE O/P EST MOD 30 MIN: CPT | Mod: S$GLB,,, | Performed by: FAMILY MEDICINE

## 2021-11-22 PROCEDURE — 99999 PR PBB SHADOW E&M-EST. PATIENT-LVL III: CPT | Mod: PBBFAC,,, | Performed by: FAMILY MEDICINE

## 2021-11-22 PROCEDURE — 99999 PR PBB SHADOW E&M-EST. PATIENT-LVL III: ICD-10-PCS | Mod: PBBFAC,,, | Performed by: FAMILY MEDICINE

## 2021-12-03 ENCOUNTER — PATIENT MESSAGE (OUTPATIENT)
Dept: INTERNAL MEDICINE | Facility: CLINIC | Age: 41
End: 2021-12-03
Payer: COMMERCIAL

## 2021-12-03 DIAGNOSIS — R05.3 CHRONIC COUGH: Primary | ICD-10-CM

## 2021-12-07 ENCOUNTER — PATIENT MESSAGE (OUTPATIENT)
Dept: PULMONOLOGY | Facility: CLINIC | Age: 41
End: 2021-12-07
Payer: COMMERCIAL

## 2021-12-08 DIAGNOSIS — R05.3 CHRONIC COUGH: Primary | ICD-10-CM

## 2021-12-09 ENCOUNTER — OFFICE VISIT (OUTPATIENT)
Dept: PSYCHIATRY | Facility: CLINIC | Age: 41
End: 2021-12-09
Payer: COMMERCIAL

## 2021-12-09 DIAGNOSIS — F41.1 GENERALIZED ANXIETY DISORDER WITH PANIC ATTACKS: ICD-10-CM

## 2021-12-09 DIAGNOSIS — F90.2 ADHD (ATTENTION DEFICIT HYPERACTIVITY DISORDER), COMBINED TYPE: ICD-10-CM

## 2021-12-09 DIAGNOSIS — F41.0 GENERALIZED ANXIETY DISORDER WITH PANIC ATTACKS: ICD-10-CM

## 2021-12-09 DIAGNOSIS — Z76.89 ENCOUNTER FOR WEIGHT MANAGEMENT: ICD-10-CM

## 2021-12-09 DIAGNOSIS — F33.1 MODERATE RECURRENT MAJOR DEPRESSION: Primary | ICD-10-CM

## 2021-12-09 PROCEDURE — 99214 OFFICE O/P EST MOD 30 MIN: CPT | Mod: 95,,, | Performed by: NURSE PRACTITIONER

## 2021-12-09 PROCEDURE — 99214 PR OFFICE/OUTPT VISIT, EST, LEVL IV, 30-39 MIN: ICD-10-PCS | Mod: 95,,, | Performed by: NURSE PRACTITIONER

## 2021-12-09 RX ORDER — LISDEXAMFETAMINE DIMESYLATE CAPSULES 20 MG/1
20 CAPSULE ORAL EVERY MORNING
Qty: 30 CAPSULE | Refills: 0 | Status: SHIPPED | OUTPATIENT
Start: 2021-12-09 | End: 2022-01-06

## 2021-12-09 RX ORDER — SERTRALINE HYDROCHLORIDE 100 MG/1
100 TABLET, FILM COATED ORAL DAILY
Qty: 30 TABLET | Refills: 2 | Status: SHIPPED | OUTPATIENT
Start: 2021-12-09 | End: 2022-03-18

## 2021-12-09 RX ORDER — SERTRALINE HYDROCHLORIDE 50 MG/1
50 TABLET, FILM COATED ORAL DAILY
Qty: 30 TABLET | Refills: 2 | Status: SHIPPED | OUTPATIENT
Start: 2021-12-09 | End: 2022-03-09

## 2021-12-17 ENCOUNTER — OFFICE VISIT (OUTPATIENT)
Dept: URGENT CARE | Facility: CLINIC | Age: 41
End: 2021-12-17
Payer: COMMERCIAL

## 2021-12-17 VITALS
HEART RATE: 73 BPM | BODY MASS INDEX: 39.68 KG/M2 | WEIGHT: 293 LBS | SYSTOLIC BLOOD PRESSURE: 141 MMHG | RESPIRATION RATE: 18 BRPM | HEIGHT: 72 IN | TEMPERATURE: 98 F | DIASTOLIC BLOOD PRESSURE: 81 MMHG | OXYGEN SATURATION: 98 %

## 2021-12-17 DIAGNOSIS — R53.83 FATIGUE, UNSPECIFIED TYPE: ICD-10-CM

## 2021-12-17 DIAGNOSIS — J06.9 VIRAL URI: Primary | ICD-10-CM

## 2021-12-17 LAB
CTP QC/QA: YES
SARS-COV-2 RDRP RESP QL NAA+PROBE: NEGATIVE

## 2021-12-17 PROCEDURE — U0002: ICD-10-PCS | Mod: QW,S$GLB,, | Performed by: EMERGENCY MEDICINE

## 2021-12-17 PROCEDURE — 99213 PR OFFICE/OUTPT VISIT, EST, LEVL III, 20-29 MIN: ICD-10-PCS | Mod: S$GLB,,, | Performed by: EMERGENCY MEDICINE

## 2021-12-17 PROCEDURE — 99213 OFFICE O/P EST LOW 20 MIN: CPT | Mod: S$GLB,,, | Performed by: EMERGENCY MEDICINE

## 2021-12-17 PROCEDURE — U0002 COVID-19 LAB TEST NON-CDC: HCPCS | Mod: QW,S$GLB,, | Performed by: EMERGENCY MEDICINE

## 2021-12-21 DIAGNOSIS — E66.01 MORBID OBESITY: Primary | ICD-10-CM

## 2021-12-28 ENCOUNTER — PATIENT MESSAGE (OUTPATIENT)
Dept: PAIN MEDICINE | Facility: CLINIC | Age: 41
End: 2021-12-28
Payer: COMMERCIAL

## 2022-01-03 ENCOUNTER — PATIENT MESSAGE (OUTPATIENT)
Dept: PSYCHIATRY | Facility: CLINIC | Age: 42
End: 2022-01-03
Payer: COMMERCIAL

## 2022-01-06 ENCOUNTER — OFFICE VISIT (OUTPATIENT)
Dept: PSYCHIATRY | Facility: CLINIC | Age: 42
End: 2022-01-06
Payer: COMMERCIAL

## 2022-01-06 DIAGNOSIS — F41.0 GENERALIZED ANXIETY DISORDER WITH PANIC ATTACKS: ICD-10-CM

## 2022-01-06 DIAGNOSIS — F41.1 GENERALIZED ANXIETY DISORDER WITH PANIC ATTACKS: ICD-10-CM

## 2022-01-06 DIAGNOSIS — F90.2 ADHD (ATTENTION DEFICIT HYPERACTIVITY DISORDER), COMBINED TYPE: Primary | ICD-10-CM

## 2022-01-06 DIAGNOSIS — F33.1 MODERATE RECURRENT MAJOR DEPRESSION: ICD-10-CM

## 2022-01-06 DIAGNOSIS — F51.01 PRIMARY INSOMNIA: ICD-10-CM

## 2022-01-06 PROCEDURE — 99214 PR OFFICE/OUTPT VISIT, EST, LEVL IV, 30-39 MIN: ICD-10-PCS | Mod: 95,,, | Performed by: NURSE PRACTITIONER

## 2022-01-06 PROCEDURE — 1160F PR REVIEW ALL MEDS BY PRESCRIBER/CLIN PHARMACIST DOCUMENTED: ICD-10-PCS | Mod: CPTII,95,, | Performed by: NURSE PRACTITIONER

## 2022-01-06 PROCEDURE — 99214 OFFICE O/P EST MOD 30 MIN: CPT | Mod: 95,,, | Performed by: NURSE PRACTITIONER

## 2022-01-06 PROCEDURE — 1160F RVW MEDS BY RX/DR IN RCRD: CPT | Mod: CPTII,95,, | Performed by: NURSE PRACTITIONER

## 2022-01-06 PROCEDURE — 1159F PR MEDICATION LIST DOCUMENTED IN MEDICAL RECORD: ICD-10-PCS | Mod: CPTII,95,, | Performed by: NURSE PRACTITIONER

## 2022-01-06 PROCEDURE — 1159F MED LIST DOCD IN RCRD: CPT | Mod: CPTII,95,, | Performed by: NURSE PRACTITIONER

## 2022-01-06 RX ORDER — LISDEXAMFETAMINE DIMESYLATE 40 MG/1
40 CAPSULE ORAL DAILY
Qty: 14 CAPSULE | Refills: 0 | Status: SHIPPED | OUTPATIENT
Start: 2022-01-06 | End: 2022-02-15 | Stop reason: SDUPTHER

## 2022-01-06 NOTE — PROGRESS NOTES
"Outpatient Psychiatry Follow-Up Visit (MD/NP)      Disclaimer: Evaluation and treatment is based on information presented to date. Any new information may affect assessment and findings.     The patient location is: car      Visit type: audiovisual      29 minutes of total time spent on the encounter, which includes face to face time and non-face to face time preparing to see the patient (eg, review of tests), Obtaining and/or reviewing separately obtained history, Documenting clinical information in the electronic or other health record, Independently interpreting results (not separately reported) and communicating results to the patient/family/caregiver, or Care coordination (not separately reported).         Each patient to whom he or she provides medical services by telemedicine is:  (1) informed of the relationship between the physician and patient and the respective role of any other health care provider with respect to management of the patient; and (2) notified that he or she may decline to receive medical services by telemedicine and may withdraw from such care at any time.        Crisis Disclaimer: Patient was informed that due to the virtual nature of the visit, that if a crisis develops, protocols will be implemented to ensure patient safety, including but not limited to: 1) Initiating a welfare check with local Law Enforcement, 2) Calling 911/National Crisis Hotline, and/or 3) Initiating PEC/CEC procedures.         Clinical Status of Patient:  Outpatient (Ambulatory)    Chief Complaint:  Brook Burdick is a 41 y.o. female who presents today for follow-up of depression, anxiety and attention problems.  Met with patient.      Interval History and Content of Current Session:  Interim Events/Subjective Report/Content of Current Session: completed/ reviewed questionnaires with patient. KATHLEEN, PHQ    PHQ 9 =  15  KATHLEEN 7 = 6    Verbalizes " not a lot of depression, little bit here and there, I over think " "things"  Agitation, not being able to focus  Sleep " everywhere"    " I fall asleep on the phone with boyfriend, around 9 pm"  Has been binge eating, I feel the depression has come from my weight gain  Has appt with nutritionist spoken to wt management clinic    She does not want to have bariatric surgery  " I feel like it's a mind thing"  When I get down, I want to eat a pint of ice cream  Currently trying the keto diet    Takes vyvanse at 7 am.  Does not last entire day of work or evening  Denies side effects  -No history of Cardiac problems,  EKG's done and no history of arrhythmia, discussed with patient, cardiac risks associated with stimulants, previously tolerated medications.    " I will get a pint of ice cream and eat it at night"   Started keto on Monday  Recently stopped eating sugar    She is in pain management and takes mobic and gabapentin  Has chronic pain to knees and back  " I have arthritis everywhere, knees, shoulder, neck, pelvis"    Pain keeps her up at night                          Depressive Disorder: REPORTS sleep change, tired/fatigued.   Anxiety Disorder: hyperarousal symptoms, muscle tension, sleep problems, concentration problems, excessive worry.   Manic Disorder: DENIES none.   Psychotic Disorder: DENIES none.   Substance Use:  DENIES none.   Physical or Sexual Abuse: denies history of physical or sexual abuse       Review of Systems   · PSYCHIATRIC: Pertinant items are noted in the narrative.    Past Medical, Family and Social History: The patient's past medical, family and social history have been reviewed and updated as appropriate within the electronic medical record - see encounter notes.    Compliance: yes    Side effects: None    Risk Parameters:  Patient reports no suicidal ideation  Patient reports no homicidal ideation  Patient reports no self-injurious behavior  Patient reports no violent behavior    Exam (detailed: at least 9 elements; comprehensive: all 15 elements) " "  Constitutional  Vitals:  Most recent vital signs, dated less than 90 days prior to this appointment, were reviewed.   Last 3 sets of Vitals    Vitals - 1 value per visit 10/30/2021 11/22/2021 12/17/2021   SYSTOLIC 121 120 141   DIASTOLIC 56 82 81   PULSE 68 103 73   TEMPERATURE 98.1 96.9 98.4   RESPIRATIONS 20 18 18   SPO2 94 95 98   Weight (lb) - 352.52 352   Weight (kg) - 159.9 159.666   HEIGHT - - 6' 0"   BODY MASS INDEX - 47.81 47.74   VISIT REPORT - - -   Pain Score  - 0 -   Some recent data might be hidden          General:  unremarkable, age appropriate, casually dressed, neatly groomed     Musculoskeletal  Muscle Strength/Tone:  not examined   Gait & Station:  non-ataxic     Psychiatric  Speech:  no latency; no press   Mood & Affect:  steady  congruent and appropriate   Thought Process:  normal and logical   Associations:  intact   Thought Content:  normal, no suicidality, no homicidality, delusions, or paranoia   Insight:  intact, has awareness of illness   Judgement: behavior is adequate to circumstances   Orientation:  grossly intact   Memory: intact for content of interview   Language: grossly intact   Attention Span & Concentration:  able to focus   Fund of Knowledge:  intact and appropriate to age and level of education, familiar with aspects of current personal life     Assessment and Diagnosis   Status/Progress: Based on the examination today, the patient's problem(s) is/are inadequately controlled.  New problems have been presented today.   Co-morbidities and sleep; weight management are complicating management of the primary condition.  There are no active rule-out diagnoses for this patient at this time.     General Impression:     No diagnosis found.      Intervention/Counseling/Treatment Plan   · Medication Management: The risks and benefits of medication were discussed with the patient.  Discussed sleep hygiene  Increase vyvanse 40 mg x 14 days  Refer to sleep clinic    Reviewed " pathophysiology of ADHD, depression and anxiety. Discussed rationale behind treatment. Reviewed treatment options, including medication and psychotherapy. Reviewed pharmacology including onset of action, dosing, side effects, adverse reactions and duration of therapy (6-12 months).  Disc  ussed the need to stay on medication if it is effective and not discontinue after a few weeks due to the probability of relapse.    Pt expressed appreciation for the visit today and did not have further question at this time though pt  was still informed to:     Call / Walk In if problems.    Call Report Side Effects to Psyc MD     Encouraged to follow up with primary care / Gen Med MD for continued monitoring of general health and wellness.    Call 911  Or go to ER if Acute Concerns (especially if any thoughts of harm to self or other).     Understanding was expressed; and no further concerns nor questions were raised at this time.        Return to Clinic: 2 weeks

## 2022-01-06 NOTE — PATIENT INSTRUCTIONS
Insomnia    Sleep problems are fairly common. In fact, one in four people experience sleep  difficulties, which include trouble falling asleep, trouble staying asleep, early morning  waking, sleeping too much, or restless or unsatisfying sleep. Getting a good nights  sleep can improve your mental well-being and help you to better manage your anxiety.  The good news is that there are things you can do to improve your sleep.  TIP: Sleep problems can be the result of various conditions or  medical problems. Therefore, it is important to discuss your sleep  problems with your doctor.  To improve your sleep, try some of the following strategies:  Create a Comfortable Sleep Environment. If you want to have a good sleep, it helps  to create a comfortable sleep environment. Make sure that you have a supportive  mattress and fresh, comfortable bedding. Also, try to ensure that your room is not too  hot or cold, minimize noise, and block out light.  Relax. Try doing something to relax your body and mind before going to bed. Try  taking a hot bath 90 minutes before you plan to go to bed. Or, try a relaxation exercise  (see Calm Breathing and Progressive Muscle Relaxation), meditation, or listening to  calming music.  Have a Snack. Although a heavy meal late in the evening can disrupt sleep, a healthy  light snack in the evening can improve sleep. Try eating light cheese and crackers,  turkey, or bananas, or drink a warm glass of milk. Avoid heavy, spicy, or sugary foods.  Get Physical. People who exercise tend to have more restful sleep. Exercising for at  least 30 minutes three times a week can improve your sleep. So, get moving! Go for a  walk or a run. The best time to exercise is in the late afternoon or early evening.  Exercising in the morning, while good for you, wont help with sleep. Exercising less than  two hours before bedtime can actually interfere with sleep.  Set a Bedtime Routine. Having a bedtime routine  cues your body that its time to  sleep. So, establish a set routine that you follow every night. For example, have a hot  bath, put on your pajamas, brush your teeth, and then listen to soft music and read on  the couch until you start to feel sleepy and then go to bed.   © [employer] 2  Establish a Fixed Awakening Time. Try waking up at the same time every day (even  on weekends) no matter how well or how poorly you have slept. This way your body will  begin to get used to a regular sleep rhythm.  Sleep Only When Sleepy. Dont force yourself into bed at a particularly time if youre  not feeling sleepy. Youll only lie awake in bed, frustrated that you cant sleep.  Just for Sleeping. Your bed should be used strictly for sleeping (sex is the only  exception). Try to avoid reading, watching television, working, or studying in bed,  because these activities keep your mind active, which gets in the way of sleep.  Get Out of Bed. If you cant fall asleep after 20 to 30 minutes, get out of bed and do  something boring (e.g., read the manual on how to program your clock radio, read the  sports section of the newspaper if youre not a sports fan) or try relaxing (e.g., meditate,  listen to calm music, have a warm de-caffeinated drink). When you start to feel sleepy,  try going back to bed. This strategy can feel like you are making things worse, but if you  stick with it, it can really help.  Dont Worry. Leave your worries about work, school, health, relationships, etc. out of  the bedroom. Try scheduling a worry time earlier in the evening to deal with your  worries. If you wake up in the middle of the night worrying, try writing down your worries  and tell yourself that you will address them in the morning.  TIP: Worrying about not sleeping doesnt help - it just makes it more likely  that you wont sleep. Let go of your belief that you have to get eight hours  of sleep or you cant function. Stop looking at the  clock and stop trying to  make yourself fall sleep. It will happen when it happens.  Avoid Caffeine. Avoid consuming caffeine at least four hours before bedtime. This  includes coffee, some teas, soft drinks, and chocolate. Caffeine is a stimulant and it can  keep you awake.  Avoid Alcohol. Although you may think that alcohol will help you fall asleep, it  interferes with sleep later in the evening. So, try to avoid consuming alcohol at least  four hours before bed.  Dont Smoke Before Bed. Try to avoid smoking at least four hours before bedtime as it  can interfere with a good nights sleep.  Skip the Nap. Naps can interfere with normal sleep cycles. So, if youre having trouble  sleeping, avoid taking naps. That way, your body will be more tired when its bedtime.  Get Some Natural Light. Try to spend some time outdoors or in natural light every day.  Getting some sunlight early in the day can be helpful for setting your bodys natural  wake and sleep cycle.   © [employer] 3  Keys to Success:  Start Small! Making small changes can have a large impact on your sleep. Dont  try to do everything all at once. Instead, pick one or two strategies and try them  consistently. When youre ready, try adding a new strategy. The goal is to slowly  start increasing behaviours that can help you sleep, while reducing the things that  are interfering with your sleep.  Be Consistent. Pick a strategy and use it consistently. Try to do the same thing  every night.  Be Patient. These strategies can take time to improve your sleep. In fact,  sometimes things can get worse before they get better. Hang in there and stick  with it!  Chart Your Progress. Use the Sleep Diary form to keep track of the strategies  youre using and your weekly progress.    Don't:    Don't:  1. Exercise just before going to bed. Try to keep it no closer than 3-4 hrs before bed.  2. Engage in stimulating activity just before bed, such as playing a  competitive game,  watching an exciting program on television or movie, or having an important discussion  with a loved one.  3. Have caffeine in the evening (coffee, many teas, chocolate, sodas, etc.)  4. Read or watch television in bed.  5. Use alcohol to help you sleep. It actually interrupts your sleep cycle.  6. Go to bed too hungry or too full.  7. Take another person's sleeping pills.  8. Take over-the-counter sleeping pills, without your doctor's knowledge. Tolerance can  develop rapidly with these medications.  9. Take daytime naps. If you do, keep them to no more than 20 minutes, 8 hrs before  bedtime.  10. Command yourself to go to sleep. This only makes your mind and body more alert.  11. Watch the clock or count minutes; this usually causes more anxiety, which keeps you  up.  12. Lie in bed awake for more than 20-30 minutes. Instead, get up, go to a different  room (or different part of the bedroom), participate in a quiet activity (e.g. non-excitable  reading), and then return to bed when you feel sleepy. Do not turn on lights or sit in front  of a bright TV or computer, this will stimulate your brain to wake up. Stay in a dark,  quiet place. Do this as many times during the night as needed.  13. Succumb to maladaptive thoughts like: Oh no, look how late it is, Ill never get to  sleep or I must have eight hours of sleep each night, if I get less than eight hours of  sleep I will get sick. Challenge your concerns and avoid catastrophizing. Remember  that we cannot fully control our sleep process. Trying too hard to control it will make  you more tense and more awake.  14. Change your daytime routine the next day if you didnt sleep well. Even if you have  a bad night sleep and are tired it is important that you try to keep your daytime activities  the same as you had planned. That is, dont avoid activities or stay in bed late because  you feel tired. This can reinforce the insomnia.  15. Increase  caffeine intakes the next day, this can keep you up again the following night.

## 2022-01-12 ENCOUNTER — PATIENT MESSAGE (OUTPATIENT)
Dept: PAIN MEDICINE | Facility: CLINIC | Age: 42
End: 2022-01-12

## 2022-01-12 ENCOUNTER — OFFICE VISIT (OUTPATIENT)
Dept: PAIN MEDICINE | Facility: CLINIC | Age: 42
End: 2022-01-12
Payer: COMMERCIAL

## 2022-01-12 ENCOUNTER — PATIENT MESSAGE (OUTPATIENT)
Dept: ENDOSCOPY | Facility: HOSPITAL | Age: 42
End: 2022-01-12
Payer: COMMERCIAL

## 2022-01-12 DIAGNOSIS — M25.562 CHRONIC PAIN OF BOTH KNEES: ICD-10-CM

## 2022-01-12 DIAGNOSIS — M25.561 CHRONIC PAIN OF BOTH KNEES: ICD-10-CM

## 2022-01-12 DIAGNOSIS — E66.01 MORBID OBESITY WITH BMI OF 40.0-44.9, ADULT: ICD-10-CM

## 2022-01-12 DIAGNOSIS — G89.29 CHRONIC MYOFASCIAL PAIN: Primary | ICD-10-CM

## 2022-01-12 DIAGNOSIS — M79.12 MYALGIA OF AUXILIARY MUSCLES, HEAD AND NECK: ICD-10-CM

## 2022-01-12 DIAGNOSIS — M79.18 CHRONIC MYOFASCIAL PAIN: Primary | ICD-10-CM

## 2022-01-12 DIAGNOSIS — G89.29 CHRONIC PAIN OF BOTH KNEES: ICD-10-CM

## 2022-01-12 PROCEDURE — 99214 PR OFFICE/OUTPT VISIT, EST, LEVL IV, 30-39 MIN: ICD-10-PCS | Mod: 95,,, | Performed by: PHYSICAL MEDICINE & REHABILITATION

## 2022-01-12 PROCEDURE — 1159F PR MEDICATION LIST DOCUMENTED IN MEDICAL RECORD: ICD-10-PCS | Mod: CPTII,95,, | Performed by: PHYSICAL MEDICINE & REHABILITATION

## 2022-01-12 PROCEDURE — 1160F RVW MEDS BY RX/DR IN RCRD: CPT | Mod: CPTII,95,, | Performed by: PHYSICAL MEDICINE & REHABILITATION

## 2022-01-12 PROCEDURE — 1159F MED LIST DOCD IN RCRD: CPT | Mod: CPTII,95,, | Performed by: PHYSICAL MEDICINE & REHABILITATION

## 2022-01-12 PROCEDURE — 99214 OFFICE O/P EST MOD 30 MIN: CPT | Mod: 95,,, | Performed by: PHYSICAL MEDICINE & REHABILITATION

## 2022-01-12 PROCEDURE — 1160F PR REVIEW ALL MEDS BY PRESCRIBER/CLIN PHARMACIST DOCUMENTED: ICD-10-PCS | Mod: CPTII,95,, | Performed by: PHYSICAL MEDICINE & REHABILITATION

## 2022-01-12 RX ORDER — MELOXICAM 7.5 MG/1
7.5 TABLET ORAL 2 TIMES DAILY
Qty: 60 TABLET | Refills: 2 | Status: SHIPPED | OUTPATIENT
Start: 2022-01-12 | End: 2022-02-09 | Stop reason: SDUPTHER

## 2022-01-12 RX ORDER — PREGABALIN 100 MG/1
100 CAPSULE ORAL 2 TIMES DAILY
Qty: 60 CAPSULE | Refills: 2 | Status: SHIPPED | OUTPATIENT
Start: 2022-01-12 | End: 2022-02-09 | Stop reason: SDUPTHER

## 2022-01-12 RX ORDER — CYCLOBENZAPRINE HCL 10 MG
TABLET ORAL
Qty: 60 TABLET | Refills: 2 | Status: SHIPPED | OUTPATIENT
Start: 2022-01-12 | End: 2022-02-09 | Stop reason: ALTCHOICE
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
## 2022-01-31 ENCOUNTER — PATIENT MESSAGE (OUTPATIENT)
Dept: PSYCHIATRY | Facility: CLINIC | Age: 42
End: 2022-01-31
Payer: COMMERCIAL

## 2022-02-03 ENCOUNTER — PATIENT MESSAGE (OUTPATIENT)
Dept: PULMONOLOGY | Facility: CLINIC | Age: 42
End: 2022-02-03
Payer: COMMERCIAL

## 2022-02-04 ENCOUNTER — TELEPHONE (OUTPATIENT)
Dept: PAIN MEDICINE | Facility: CLINIC | Age: 42
End: 2022-02-04
Payer: COMMERCIAL

## 2022-02-04 ENCOUNTER — PATIENT MESSAGE (OUTPATIENT)
Dept: PULMONOLOGY | Facility: CLINIC | Age: 42
End: 2022-02-04
Payer: COMMERCIAL

## 2022-02-04 NOTE — TELEPHONE ENCOUNTER
Reached out to patient to reschedule appointment because the provider will be out of clinic. Apt has been made . All questions answered.     Cam Sanchez  Medical

## 2022-02-09 ENCOUNTER — OFFICE VISIT (OUTPATIENT)
Dept: PAIN MEDICINE | Facility: CLINIC | Age: 42
End: 2022-02-09
Payer: COMMERCIAL

## 2022-02-09 VITALS
BODY MASS INDEX: 39.68 KG/M2 | RESPIRATION RATE: 18 BRPM | DIASTOLIC BLOOD PRESSURE: 86 MMHG | HEART RATE: 91 BPM | WEIGHT: 293 LBS | HEIGHT: 72 IN | SYSTOLIC BLOOD PRESSURE: 141 MMHG

## 2022-02-09 DIAGNOSIS — G89.29 CHRONIC MYOFASCIAL PAIN: ICD-10-CM

## 2022-02-09 DIAGNOSIS — M79.12 MYALGIA OF AUXILIARY MUSCLES, HEAD AND NECK: ICD-10-CM

## 2022-02-09 DIAGNOSIS — G89.29 CHRONIC PAIN OF LEFT KNEE: Primary | ICD-10-CM

## 2022-02-09 DIAGNOSIS — M79.18 CHRONIC MYOFASCIAL PAIN: ICD-10-CM

## 2022-02-09 DIAGNOSIS — E66.01 MORBID OBESITY WITH BMI OF 40.0-44.9, ADULT: ICD-10-CM

## 2022-02-09 DIAGNOSIS — M25.562 CHRONIC PAIN OF LEFT KNEE: Primary | ICD-10-CM

## 2022-02-09 PROCEDURE — 3077F PR MOST RECENT SYSTOLIC BLOOD PRESSURE >= 140 MM HG: ICD-10-PCS | Mod: CPTII,S$GLB,, | Performed by: PHYSICAL MEDICINE & REHABILITATION

## 2022-02-09 PROCEDURE — 99214 PR OFFICE/OUTPT VISIT, EST, LEVL IV, 30-39 MIN: ICD-10-PCS | Mod: 25,S$GLB,, | Performed by: PHYSICAL MEDICINE & REHABILITATION

## 2022-02-09 PROCEDURE — 99999 PR PBB SHADOW E&M-EST. PATIENT-LVL III: CPT | Mod: PBBFAC,,, | Performed by: PHYSICAL MEDICINE & REHABILITATION

## 2022-02-09 PROCEDURE — 99999 PR PBB SHADOW E&M-EST. PATIENT-LVL III: ICD-10-PCS | Mod: PBBFAC,,, | Performed by: PHYSICAL MEDICINE & REHABILITATION

## 2022-02-09 PROCEDURE — 20552 PR INJECT TRIGGER POINT, 1 OR 2: ICD-10-PCS | Mod: 59,S$GLB,, | Performed by: PHYSICAL MEDICINE & REHABILITATION

## 2022-02-09 PROCEDURE — 1160F PR REVIEW ALL MEDS BY PRESCRIBER/CLIN PHARMACIST DOCUMENTED: ICD-10-PCS | Mod: CPTII,S$GLB,, | Performed by: PHYSICAL MEDICINE & REHABILITATION

## 2022-02-09 PROCEDURE — 3008F BODY MASS INDEX DOCD: CPT | Mod: CPTII,S$GLB,, | Performed by: PHYSICAL MEDICINE & REHABILITATION

## 2022-02-09 PROCEDURE — 20552 NJX 1/MLT TRIGGER POINT 1/2: CPT | Mod: 59,S$GLB,, | Performed by: PHYSICAL MEDICINE & REHABILITATION

## 2022-02-09 PROCEDURE — 20610 PR DRAIN/INJECT LARGE JOINT/BURSA: ICD-10-PCS | Mod: LT,S$GLB,, | Performed by: PHYSICAL MEDICINE & REHABILITATION

## 2022-02-09 PROCEDURE — 3079F PR MOST RECENT DIASTOLIC BLOOD PRESSURE 80-89 MM HG: ICD-10-PCS | Mod: CPTII,S$GLB,, | Performed by: PHYSICAL MEDICINE & REHABILITATION

## 2022-02-09 PROCEDURE — 1159F PR MEDICATION LIST DOCUMENTED IN MEDICAL RECORD: ICD-10-PCS | Mod: CPTII,S$GLB,, | Performed by: PHYSICAL MEDICINE & REHABILITATION

## 2022-02-09 PROCEDURE — 1160F RVW MEDS BY RX/DR IN RCRD: CPT | Mod: CPTII,S$GLB,, | Performed by: PHYSICAL MEDICINE & REHABILITATION

## 2022-02-09 PROCEDURE — 3008F PR BODY MASS INDEX (BMI) DOCUMENTED: ICD-10-PCS | Mod: CPTII,S$GLB,, | Performed by: PHYSICAL MEDICINE & REHABILITATION

## 2022-02-09 PROCEDURE — 3077F SYST BP >= 140 MM HG: CPT | Mod: CPTII,S$GLB,, | Performed by: PHYSICAL MEDICINE & REHABILITATION

## 2022-02-09 PROCEDURE — 1159F MED LIST DOCD IN RCRD: CPT | Mod: CPTII,S$GLB,, | Performed by: PHYSICAL MEDICINE & REHABILITATION

## 2022-02-09 PROCEDURE — 20610 DRAIN/INJ JOINT/BURSA W/O US: CPT | Mod: LT,S$GLB,, | Performed by: PHYSICAL MEDICINE & REHABILITATION

## 2022-02-09 PROCEDURE — 3079F DIAST BP 80-89 MM HG: CPT | Mod: CPTII,S$GLB,, | Performed by: PHYSICAL MEDICINE & REHABILITATION

## 2022-02-09 PROCEDURE — 99214 OFFICE O/P EST MOD 30 MIN: CPT | Mod: 25,S$GLB,, | Performed by: PHYSICAL MEDICINE & REHABILITATION

## 2022-02-09 RX ORDER — METHYLPREDNISOLONE ACETATE 40 MG/ML
40 INJECTION, SUSPENSION INTRA-ARTICULAR; INTRALESIONAL; INTRAMUSCULAR; SOFT TISSUE
Status: COMPLETED | OUTPATIENT
Start: 2022-02-09 | End: 2022-02-09

## 2022-02-09 RX ORDER — KETOROLAC TROMETHAMINE 30 MG/ML
30 INJECTION, SOLUTION INTRAMUSCULAR; INTRAVENOUS ONCE
Status: COMPLETED | OUTPATIENT
Start: 2022-02-09 | End: 2022-02-09

## 2022-02-09 RX ORDER — PREGABALIN 100 MG/1
100 CAPSULE ORAL 2 TIMES DAILY
Qty: 60 CAPSULE | Refills: 2 | Status: SHIPPED | OUTPATIENT
Start: 2022-02-09 | End: 2022-04-06

## 2022-02-09 RX ORDER — MELOXICAM 7.5 MG/1
7.5 TABLET ORAL 2 TIMES DAILY
Qty: 60 TABLET | Refills: 2 | Status: SHIPPED | OUTPATIENT
Start: 2022-02-09 | End: 2022-06-20

## 2022-02-09 RX ADMIN — KETOROLAC TROMETHAMINE 30 MG: 30 INJECTION, SOLUTION INTRAMUSCULAR; INTRAVENOUS at 11:02

## 2022-02-09 RX ADMIN — METHYLPREDNISOLONE ACETATE 40 MG: 40 INJECTION, SUSPENSION INTRA-ARTICULAR; INTRALESIONAL; INTRAMUSCULAR; SOFT TISSUE at 11:02
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
## 2022-02-15 DIAGNOSIS — F90.2 ADHD (ATTENTION DEFICIT HYPERACTIVITY DISORDER), COMBINED TYPE: ICD-10-CM

## 2022-02-15 RX ORDER — LISDEXAMFETAMINE DIMESYLATE 40 MG/1
40 CAPSULE ORAL DAILY
Qty: 14 CAPSULE | Refills: 0 | Status: SHIPPED | OUTPATIENT
Start: 2022-02-15 | End: 2022-03-14

## 2022-02-28 ENCOUNTER — PATIENT MESSAGE (OUTPATIENT)
Dept: PULMONOLOGY | Facility: CLINIC | Age: 42
End: 2022-02-28
Payer: COMMERCIAL

## 2022-03-11 ENCOUNTER — PATIENT MESSAGE (OUTPATIENT)
Dept: INTERNAL MEDICINE | Facility: CLINIC | Age: 42
End: 2022-03-11

## 2022-03-11 ENCOUNTER — OFFICE VISIT (OUTPATIENT)
Dept: INTERNAL MEDICINE | Facility: CLINIC | Age: 42
End: 2022-03-11
Payer: COMMERCIAL

## 2022-03-11 DIAGNOSIS — M79.641 HAND PAIN, RIGHT: ICD-10-CM

## 2022-03-11 DIAGNOSIS — J30.9 ALLERGIC RHINITIS, UNSPECIFIED SEASONALITY, UNSPECIFIED TRIGGER: ICD-10-CM

## 2022-03-11 DIAGNOSIS — E66.01 MORBID OBESITY: Primary | ICD-10-CM

## 2022-03-11 DIAGNOSIS — L08.9 SKIN INFECTION: ICD-10-CM

## 2022-03-11 DIAGNOSIS — R73.03 PREDIABETES: ICD-10-CM

## 2022-03-11 PROCEDURE — 99214 PR OFFICE/OUTPT VISIT, EST, LEVL IV, 30-39 MIN: ICD-10-PCS | Mod: 95,,, | Performed by: FAMILY MEDICINE

## 2022-03-11 PROCEDURE — 99214 OFFICE O/P EST MOD 30 MIN: CPT | Mod: 95,,, | Performed by: FAMILY MEDICINE

## 2022-03-11 RX ORDER — MONTELUKAST SODIUM 10 MG/1
10 TABLET ORAL NIGHTLY
Qty: 30 TABLET | Refills: 0 | Status: SHIPPED | OUTPATIENT
Start: 2022-03-11 | End: 2022-03-19

## 2022-03-11 RX ORDER — SEMAGLUTIDE 1.34 MG/ML
0.25 INJECTION, SOLUTION SUBCUTANEOUS
Qty: 4 PEN | Refills: 1 | Status: SHIPPED | OUTPATIENT
Start: 2022-03-11 | End: 2023-03-11

## 2022-03-11 RX ORDER — CLINDAMYCIN HYDROCHLORIDE 300 MG/1
300 CAPSULE ORAL EVERY 6 HOURS
Qty: 21 CAPSULE | Refills: 0 | Status: SHIPPED | OUTPATIENT
Start: 2022-03-11 | End: 2022-03-28

## 2022-03-11 RX ORDER — MUPIROCIN 20 MG/G
OINTMENT TOPICAL 3 TIMES DAILY
Qty: 30 G | Refills: 0 | Status: SHIPPED | OUTPATIENT
Start: 2022-03-11 | End: 2022-06-20

## 2022-03-11 NOTE — TELEPHONE ENCOUNTER
Care Due:                  Date            Visit Type   Department     Provider  --------------------------------------------------------------------------------                                ESTABLISHED                              PATIENT -    [facility]  Last Visit: 03-      [facility]      MEDICINE       Yoan Henry  Next Visit: None Scheduled  None         None Found                                                            Last  Test          Frequency    Reason                     Performed    Due Date  --------------------------------------------------------------------------------    HBA1C.......  6 months...  semaglutide..............  06- 12-    Powered by [employer].. Reference number: [id number].   3/11/2022 10:14:03 AM CST

## 2022-03-11 NOTE — PROGRESS NOTES
Subjective:       Patient ID: Brook Burdick is a 41 y.o. female.    Chief Complaint: No chief complaint on file.    HPI The patient location is: home  The chief complaint leading to consultation is: numerous issues to discuss    Visit type: audiovisual    Face to Face time with patient: 20   minutes of total time spent on the encounter, which includes face to face time and non-face to face time preparing to see the patient (eg, review of tests), Obtaining and/or reviewing separately obtained history, Documenting clinical information in the electronic or other health record, Independently interpreting results (not separately reported) and communicating results to the patient/family/caregiver, or Care coordination (not separately reported).         Each patient to whom he or she provides medical services by telemedicine is:  (1) informed of the relationship between the physician and patient and the respective role of any other health care provider with respect to management of the patient; and (2) notified that he or she may decline to receive medical services by telemedicine and may withdraw from such care at any time.    Notes:       Patient Active Problem List   Diagnosis    Chronic constipation    Family history of colonic polyps    Insulin resistance    Gastroesophageal reflux disease without esophagitis    Severe obesity (BMI >= 40)    Chronic headaches    Cough    Arthritis of knee    Neck pain    Right arm pain    Decreased ROM of intervertebral discs of cervical spine    Right arm weakness    Anxiety and depression       Past Medical History:   Diagnosis Date    Arthritis of knee 9/30/2020    Encounter for blood transfusion     IBS (irritable bowel syndrome)     Liver disease     NALFD    Obesity     Pancreatitis        Past Surgical History:   Procedure Laterality Date    APPENDECTOMY      BILE DUCT EXPLORATION      CHOLECYSTECTOMY      INJECTION OF JOINT Bilateral 9/30/2020     Procedure: Bilateral intraarticular knee injection with local;  Surgeon: Ozzy Sofia MD;  Location: [facility];  Service: Pain Management;  Laterality: Bilateral;       Family History   Problem Relation Age of Onset    Atrial fibrillation Mother     Diabetes Father     Hypertension Father     Cancer Maternal Grandfather        Social History     Tobacco Use   Smoking Status Current Some Day Smoker   Smokeless Tobacco Never Used       Wt Readings from Last 5 Encounters:   03/14/22 (!) 160 kg (352 lb 11.8 oz)   02/09/22 (!) 160.5 kg (353 lb 11.7 oz)   12/17/21 (!) 159.7 kg (352 lb)   11/22/21 (!) 159.9 kg (352 lb 8.3 oz)   10/29/21 (!) 153 kg (337 lb 4.9 oz)       For further HPI details, see assessment and plan.    Review of Systems   Constitutional: Negative for chills and fever.   HENT: Positive for ear pain and postnasal drip. Negative for rhinorrhea and sore throat.    Respiratory: Positive for cough and wheezing. Negative for shortness of breath.    Cardiovascular: Positive for chest pain.   Musculoskeletal: Negative for myalgias.   Skin: Negative for rash.   Allergic/Immunologic: Positive for environmental allergies.   Neurological: Negative for headaches.       Objective:      There were no vitals filed for this visit.    Physical Exam  Constitutional:       General: She is not in acute distress.     Appearance: Normal appearance. She is well-developed.   Cardiovascular:      Rate and Rhythm: Normal rate and regular rhythm.   Pulmonary:      Effort: Pulmonary effort is normal. No respiratory distress.      Breath sounds: Normal breath sounds.   Musculoskeletal:         General: Normal range of motion.   Neurological:      Mental Status: She is alert. She is not disoriented.   Psychiatric:         Mood and Affect: Mood normal.         Speech: Speech normal.         Assessment:       BP Readings from Last 3 Encounters:   03/14/22 127/77   02/09/22 (!) 141/86   12/17/21 (!) 141/81       1. Morbid  obesity    2. Prediabetes    3. Skin infection    4. Hand pain, right    5. Allergic rhinitis, unspecified seasonality, unspecified trigger        Plan:   Morbid obesity  -     semaglutide (OZEMPIC) 0.25 mg or 0.5 mg(2 mg/1.5 mL) pen injector; Inject 0.25 mg into the skin every 7 days.  Dispense: 4 pen; Refill: 1    Prediabetes  -     semaglutide (OZEMPIC) 0.25 mg or 0.5 mg(2 mg/1.5 mL) pen injector; Inject 0.25 mg into the skin every 7 days.  Dispense: 4 pen; Refill: 1    Skin infection  -     semaglutide (OZEMPIC) 0.25 mg or 0.5 mg(2 mg/1.5 mL) pen injector; Inject 0.25 mg into the skin every 7 days.  Dispense: 4 pen; Refill: 1    Hand pain, right  -     X-Ray Hand Complete Right; Future; Expected date: 03/11/2022    Allergic rhinitis, unspecified seasonality, unspecified trigger    Other orders  -     montelukast (SINGULAIR) 10 mg tablet; Take 1 tablet (10 mg total) by mouth every evening.  Dispense: 30 tablet; Refill: 0  -     mupirocin (BACTROBAN) 2 % ointment; Apply topically 3 (three) times daily.  Dispense: 30 g; Refill: 0  -     clindamycin (CLEOCIN) 300 MG capsule; Take 1 capsule (300 mg total) by mouth every 6 (six) hours.  Dispense: 21 capsule; Refill: 0          Dry Cough  2 weeks  Sudafed  mucinex  uncertain if its her sinuses draining  No fever  No chills  Doesn't feel sick  Just fatigue.  Will treat for:  Allergy Rhinitis  Astelin, flonase, singulair, oral antihistamine.    Ear infection  Skin infection  Now face is hurting all along the left side of her face.  clindamycin    Obesity  Prediabetes  Lab Results   Component Value Date    LABA1C 5.5 04/20/2018    HGBA1C 5.9 (H) 06/16/2021   starting ozempic @ low initial dose  No FH of thyoid ca or MEN2  Discussed pros/cons/ risk    Hand pain  2-3 weeks can't open  Ongoing issue  Will get xray     2 m f/u

## 2022-03-14 ENCOUNTER — OFFICE VISIT (OUTPATIENT)
Dept: URGENT CARE | Facility: CLINIC | Age: 42
End: 2022-03-14
Payer: COMMERCIAL

## 2022-03-14 ENCOUNTER — PATIENT MESSAGE (OUTPATIENT)
Dept: PSYCHIATRY | Facility: CLINIC | Age: 42
End: 2022-03-14

## 2022-03-14 ENCOUNTER — HOSPITAL ENCOUNTER (OUTPATIENT)
Dept: RADIOLOGY | Facility: CLINIC | Age: 42
Discharge: HOME | End: 2022-03-14
Attending: NURSE PRACTITIONER
Payer: COMMERCIAL

## 2022-03-14 VITALS
HEIGHT: 72 IN | OXYGEN SATURATION: 97 % | DIASTOLIC BLOOD PRESSURE: 77 MMHG | SYSTOLIC BLOOD PRESSURE: 127 MMHG | TEMPERATURE: 97 F | HEART RATE: 75 BPM | BODY MASS INDEX: 39.68 KG/M2 | WEIGHT: 293 LBS | RESPIRATION RATE: 18 BRPM

## 2022-03-14 DIAGNOSIS — M25.561 ACUTE PAIN OF RIGHT KNEE: ICD-10-CM

## 2022-03-14 DIAGNOSIS — M25.561 ACUTE PAIN OF RIGHT KNEE: Primary | ICD-10-CM
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
## 2022-03-14 DIAGNOSIS — F90.2 ADHD (ATTENTION DEFICIT HYPERACTIVITY DISORDER), COMBINED TYPE: ICD-10-CM

## 2022-03-14 PROCEDURE — 1159F PR MEDICATION LIST DOCUMENTED IN MEDICAL RECORD: ICD-10-PCS | Mod: CPTII,S$GLB,, | Performed by: NURSE PRACTITIONER

## 2022-03-14 PROCEDURE — 3078F PR MOST RECENT DIASTOLIC BLOOD PRESSURE < 80 MM HG: ICD-10-PCS | Mod: CPTII,S$GLB,, | Performed by: NURSE PRACTITIONER

## 2022-03-14 PROCEDURE — 73562 XR KNEE 3 VIEW RIGHT: ICD-10-PCS | Mod: RT,S$GLB,, | Performed by: RADIOLOGY

## 2022-03-14 PROCEDURE — 3078F DIAST BP <80 MM HG: CPT | Mod: CPTII,S$GLB,, | Performed by: NURSE PRACTITIONER

## 2022-03-14 PROCEDURE — 1160F RVW MEDS BY RX/DR IN RCRD: CPT | Mod: CPTII,S$GLB,, | Performed by: NURSE PRACTITIONER

## 2022-03-14 PROCEDURE — 1159F MED LIST DOCD IN RCRD: CPT | Mod: CPTII,S$GLB,, | Performed by: NURSE PRACTITIONER

## 2022-03-14 PROCEDURE — 3074F SYST BP LT 130 MM HG: CPT | Mod: CPTII,S$GLB,, | Performed by: NURSE PRACTITIONER

## 2022-03-14 PROCEDURE — 3008F PR BODY MASS INDEX (BMI) DOCUMENTED: ICD-10-PCS | Mod: CPTII,S$GLB,, | Performed by: NURSE PRACTITIONER

## 2022-03-14 PROCEDURE — 99214 OFFICE O/P EST MOD 30 MIN: CPT | Mod: S$GLB,,, | Performed by: NURSE PRACTITIONER

## 2022-03-14 PROCEDURE — 3074F PR MOST RECENT SYSTOLIC BLOOD PRESSURE < 130 MM HG: ICD-10-PCS | Mod: CPTII,S$GLB,, | Performed by: NURSE PRACTITIONER

## 2022-03-14 PROCEDURE — 1160F PR REVIEW ALL MEDS BY PRESCRIBER/CLIN PHARMACIST DOCUMENTED: ICD-10-PCS | Mod: CPTII,S$GLB,, | Performed by: NURSE PRACTITIONER

## 2022-03-14 PROCEDURE — 99214 PR OFFICE/OUTPT VISIT, EST, LEVL IV, 30-39 MIN: ICD-10-PCS | Mod: S$GLB,,, | Performed by: NURSE PRACTITIONER

## 2022-03-14 PROCEDURE — 73562 X-RAY EXAM OF KNEE 3: CPT | Mod: RT,S$GLB,, | Performed by: RADIOLOGY

## 2022-03-14 PROCEDURE — 3008F BODY MASS INDEX DOCD: CPT | Mod: CPTII,S$GLB,, | Performed by: NURSE PRACTITIONER

## 2022-03-14 RX ORDER — IBUPROFEN 800 MG/1
800 TABLET ORAL EVERY 8 HOURS PRN
Qty: 30 TABLET | Refills: 0 | Status: SHIPPED | OUTPATIENT
Start: 2022-03-14 | End: 2022-03-24

## 2022-03-14 RX ORDER — LISDEXAMFETAMINE DIMESYLATE 40 MG/1
40 CAPSULE ORAL DAILY
Qty: 30 CAPSULE | Refills: 0 | Status: SHIPPED | OUTPATIENT
Start: 2022-03-14 | End: 2022-03-18

## 2022-03-14 NOTE — PROGRESS NOTES
Subjective:       Patient ID: Brook Burdick is a 41 y.o. female.    Vitals:  height is 6' (1.829 m) and weight is 160 kg (352 lb 11.8 oz) (abnormal). Her tympanic temperature is 97.2 °F (36.2 °C). Her blood pressure is 127/77 and her pulse is 75. Her respiration is 18 and oxygen saturation is 97%.     Chief Complaint: Fall    Pt state that she fall over a bag in her bed room onto her knees. C/o pain in the right knee greater than the left.    Fall  The accident occurred less than 1 hour ago. The fall occurred from a bed. She landed on carpet. The point of impact was the left knee and right knee. The pain is present in the left knee and right knee. The pain is at a severity of 8/10. The pain is mild. Pertinent negatives include no abdominal pain, bowel incontinence, fever, headaches, hearing loss, hematuria, loss of consciousness, nausea, numbness, tingling, visual change or vomiting. She has tried NSAID for the symptoms. The treatment provided no relief.       Constitution: Negative for fever.   Gastrointestinal: Negative for abdominal pain, nausea, vomiting and bowel incontinence.   Genitourinary: Negative for hematuria.   Neurological: Negative for headaches, loss of consciousness and numbness.       Objective:      Physical Exam   Constitutional: She is oriented to person, place, and time. She appears well-developed. She is cooperative. No distress.   HENT:   Head: Normocephalic and atraumatic.   Nose: Nose normal.   Mouth/Throat: Oropharynx is clear and moist and mucous membranes are normal.   Eyes: Conjunctivae and lids are normal.   Neck: Trachea normal and phonation normal. Neck supple.   Cardiovascular: Normal rate, regular rhythm, normal heart sounds and normal pulses.   Pulmonary/Chest: Effort normal and breath sounds normal. She has no decreased breath sounds. She has no wheezes.   Abdominal: Normal appearance.   Musculoskeletal:         General: No deformity.      Right knee: She exhibits swelling  (mild) and bony tenderness (patella). She exhibits normal alignment. Decreased range of motion: pain with ROM.   Neurological: She is alert and oriented to person, place, and time. She has normal strength and normal reflexes. No sensory deficit.      Comments: Limping while walking noted   Skin: Skin is warm, dry, intact and not diaphoretic.   Psychiatric: Her speech is normal and behavior is normal. Judgment and thought content normal.   Nursing note and vitals reviewed.        Assessment:       1. Acute pain of right knee    2. Fall, initial encounter          Plan:         Acute pain of right knee  -     XR KNEE 3 VIEW RIGHT; Future; Expected date: 03/14/2022  -     IMMOBILIZER FOR HOME USE  -     ibuprofen (ADVIL,MOTRIN) 800 MG tablet; Take 1 tablet (800 mg total) by mouth every 8 (eight) hours as needed for Pain.  Dispense: 30 tablet; Refill: 0    Fall, initial encounter  -     IMMOBILIZER FOR HOME USE                 XR KNEE 3 VIEW RIGHT    Result Date: 3/14/2022  EXAMINATION: XR KNEE 3 VIEW RIGHT CLINICAL HISTORY: Pain in right knee TECHNIQUE: AP, lateral, and Merchant views of the right knee were performed. COMPARISON: 08/06/2019 FINDINGS: No acute osseous abnormality.  Similar mild degenerative findings present.  No large joint effusion.  Soft tissues unchanged.     As above Electronically signed by: Lamine Holder MD Date:    03/14/2022 Time:    13:23    Knee immobilizer applied to right knee. Neurovascular checks intact.    · Rest your affected extremity as much as possible.  · Keep extremity elevated when possible.  · Take tylenol or ibuprofen as directed on label for pain. You may alternate the two every four hours.  · Apply ice packs/frozen peas to affected site four times daily for 15-20 minutes each time.  · May apply ace wrap as needed for support of injured extremity and compression to reduce swelling.   · Follow up with your primary care provider if symptoms do not improve within a few days or  sooner for any worsening.   · Go to the ER immediately for any numbness, weakness, tingling, color change, sudden pain and swelling, or for any other new and concerning symptoms.

## 2022-03-14 NOTE — PATIENT INSTRUCTIONS
Rest your affected extremity as much as possible.  Keep extremity elevated when possible.  Take tylenol or ibuprofen as directed on label for pain. You may alternate the two every four hours.  Apply ice packs/frozen peas to affected site four times daily for 15-20 minutes each time.  May apply ace wrap as needed for support of injured extremity and compression to reduce swelling.   Follow up with your primary care provider if symptoms do not improve within a few days or sooner for any worsening.   Go to the ER immediately for any numbness, weakness, tingling, color change, sudden pain and swelling, or for any other new and concerning symptoms.

## 2022-03-16 ENCOUNTER — PATIENT MESSAGE (OUTPATIENT)
Dept: PAIN MEDICINE | Facility: CLINIC | Age: 42
End: 2022-03-16

## 2022-03-16 ENCOUNTER — HOSPITAL ENCOUNTER (OUTPATIENT)
Dept: RADIOLOGY | Facility: HOSPITAL | Age: 42
Discharge: HOME | End: 2022-03-16
Attending: FAMILY MEDICINE
Payer: COMMERCIAL

## 2022-03-16 ENCOUNTER — OFFICE VISIT (OUTPATIENT)
Dept: PAIN MEDICINE | Facility: CLINIC | Age: 42
End: 2022-03-16
Payer: COMMERCIAL

## 2022-03-16 VITALS
BODY MASS INDEX: 39.68 KG/M2 | HEART RATE: 74 BPM | SYSTOLIC BLOOD PRESSURE: 122 MMHG | WEIGHT: 293 LBS | DIASTOLIC BLOOD PRESSURE: 84 MMHG | HEIGHT: 72 IN | RESPIRATION RATE: 20 BRPM

## 2022-03-16 DIAGNOSIS — M25.561 ACUTE PAIN OF RIGHT KNEE: ICD-10-CM

## 2022-03-16 DIAGNOSIS — M79.641 HAND PAIN, RIGHT: ICD-10-CM

## 2022-03-16 DIAGNOSIS — M79.641 HAND PAIN, RIGHT: Primary | ICD-10-CM
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
## 2022-03-16 PROCEDURE — 1159F MED LIST DOCD IN RCRD: CPT | Mod: CPTII,S$GLB,, | Performed by: PHYSICAL MEDICINE & REHABILITATION

## 2022-03-16 PROCEDURE — 1160F RVW MEDS BY RX/DR IN RCRD: CPT | Mod: CPTII,S$GLB,, | Performed by: PHYSICAL MEDICINE & REHABILITATION

## 2022-03-16 PROCEDURE — 3079F PR MOST RECENT DIASTOLIC BLOOD PRESSURE 80-89 MM HG: ICD-10-PCS | Mod: CPTII,S$GLB,, | Performed by: PHYSICAL MEDICINE & REHABILITATION

## 2022-03-16 PROCEDURE — 73130 X-RAY EXAM OF HAND: CPT | Mod: TC,RT

## 2022-03-16 PROCEDURE — 96372 THER/PROPH/DIAG INJ SC/IM: CPT | Mod: S$GLB,,, | Performed by: PHYSICAL MEDICINE & REHABILITATION

## 2022-03-16 PROCEDURE — 99213 OFFICE O/P EST LOW 20 MIN: CPT | Mod: 25,S$GLB,, | Performed by: PHYSICAL MEDICINE & REHABILITATION

## 2022-03-16 PROCEDURE — 3008F BODY MASS INDEX DOCD: CPT | Mod: CPTII,S$GLB,, | Performed by: PHYSICAL MEDICINE & REHABILITATION

## 2022-03-16 PROCEDURE — 73130 X-RAY EXAM OF HAND: CPT | Mod: 26,RT,, | Performed by: RADIOLOGY

## 2022-03-16 PROCEDURE — 3079F DIAST BP 80-89 MM HG: CPT | Mod: CPTII,S$GLB,, | Performed by: PHYSICAL MEDICINE & REHABILITATION

## 2022-03-16 PROCEDURE — 99213 PR OFFICE/OUTPT VISIT, EST, LEVL III, 20-29 MIN: ICD-10-PCS | Mod: 25,S$GLB,, | Performed by: PHYSICAL MEDICINE & REHABILITATION

## 2022-03-16 PROCEDURE — 1159F PR MEDICATION LIST DOCUMENTED IN MEDICAL RECORD: ICD-10-PCS | Mod: CPTII,S$GLB,, | Performed by: PHYSICAL MEDICINE & REHABILITATION

## 2022-03-16 PROCEDURE — 3074F SYST BP LT 130 MM HG: CPT | Mod: CPTII,S$GLB,, | Performed by: PHYSICAL MEDICINE & REHABILITATION

## 2022-03-16 PROCEDURE — 99999 PR PBB SHADOW E&M-EST. PATIENT-LVL III: ICD-10-PCS | Mod: PBBFAC,,, | Performed by: PHYSICAL MEDICINE & REHABILITATION

## 2022-03-16 PROCEDURE — 96372 PR INJECTION,THERAP/PROPH/DIAG2ST, IM OR SUBCUT: ICD-10-PCS | Mod: S$GLB,,, | Performed by: PHYSICAL MEDICINE & REHABILITATION

## 2022-03-16 PROCEDURE — 3008F PR BODY MASS INDEX (BMI) DOCUMENTED: ICD-10-PCS | Mod: CPTII,S$GLB,, | Performed by: PHYSICAL MEDICINE & REHABILITATION

## 2022-03-16 PROCEDURE — 73130 XR HAND COMPLETE 3 VIEW RIGHT: ICD-10-PCS | Mod: 26,RT,, | Performed by: RADIOLOGY

## 2022-03-16 PROCEDURE — 3074F PR MOST RECENT SYSTOLIC BLOOD PRESSURE < 130 MM HG: ICD-10-PCS | Mod: CPTII,S$GLB,, | Performed by: PHYSICAL MEDICINE & REHABILITATION

## 2022-03-16 PROCEDURE — 1160F PR REVIEW ALL MEDS BY PRESCRIBER/CLIN PHARMACIST DOCUMENTED: ICD-10-PCS | Mod: CPTII,S$GLB,, | Performed by: PHYSICAL MEDICINE & REHABILITATION

## 2022-03-16 PROCEDURE — 99999 PR PBB SHADOW E&M-EST. PATIENT-LVL III: CPT | Mod: PBBFAC,,, | Performed by: PHYSICAL MEDICINE & REHABILITATION

## 2022-03-16 RX ORDER — KETOROLAC TROMETHAMINE 30 MG/ML
30 INJECTION, SOLUTION INTRAMUSCULAR; INTRAVENOUS ONCE
Status: COMPLETED | OUTPATIENT
Start: 2022-03-16 | End: 2022-03-16

## 2022-03-16 RX ADMIN — KETOROLAC TROMETHAMINE 30 MG: 30 INJECTION, SOLUTION INTRAMUSCULAR; INTRAVENOUS at 10:03
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
## 2022-03-17 ENCOUNTER — TELEPHONE (OUTPATIENT)
Dept: URGENT CARE | Facility: CLINIC | Age: 42
End: 2022-03-17
Payer: COMMERCIAL

## 2022-03-17 ENCOUNTER — PATIENT MESSAGE (OUTPATIENT)
Dept: INTERNAL MEDICINE | Facility: CLINIC | Age: 42
End: 2022-03-17
Payer: COMMERCIAL

## 2022-03-18 ENCOUNTER — OFFICE VISIT (OUTPATIENT)
Dept: PSYCHIATRY | Facility: CLINIC | Age: 42
End: 2022-03-18
Payer: COMMERCIAL

## 2022-03-18 DIAGNOSIS — F33.1 MODERATE RECURRENT MAJOR DEPRESSION: Primary | ICD-10-CM

## 2022-03-18 DIAGNOSIS — F41.1 GENERALIZED ANXIETY DISORDER WITH PANIC ATTACKS: ICD-10-CM

## 2022-03-18 DIAGNOSIS — F41.0 GENERALIZED ANXIETY DISORDER WITH PANIC ATTACKS: ICD-10-CM

## 2022-03-18 DIAGNOSIS — F90.2 ADHD (ATTENTION DEFICIT HYPERACTIVITY DISORDER), COMBINED TYPE: ICD-10-CM

## 2022-03-18 PROCEDURE — 1160F PR REVIEW ALL MEDS BY PRESCRIBER/CLIN PHARMACIST DOCUMENTED: ICD-10-PCS | Mod: CPTII,95,, | Performed by: NURSE PRACTITIONER

## 2022-03-18 PROCEDURE — 99215 PR OFFICE/OUTPT VISIT, EST, LEVL V, 40-54 MIN: ICD-10-PCS | Mod: 95,,, | Performed by: NURSE PRACTITIONER

## 2022-03-18 PROCEDURE — 99215 OFFICE O/P EST HI 40 MIN: CPT | Mod: 95,,, | Performed by: NURSE PRACTITIONER

## 2022-03-18 PROCEDURE — 90833 PR PSYCHOTHERAPY W/PATIENT W/E&M, 30 MIN (ADD ON): ICD-10-PCS | Mod: 95,,, | Performed by: NURSE PRACTITIONER

## 2022-03-18 PROCEDURE — 1159F MED LIST DOCD IN RCRD: CPT | Mod: CPTII,95,, | Performed by: NURSE PRACTITIONER

## 2022-03-18 PROCEDURE — 90833 PSYTX W PT W E/M 30 MIN: CPT | Mod: 95,,, | Performed by: NURSE PRACTITIONER

## 2022-03-18 PROCEDURE — 1159F PR MEDICATION LIST DOCUMENTED IN MEDICAL RECORD: ICD-10-PCS | Mod: CPTII,95,, | Performed by: NURSE PRACTITIONER

## 2022-03-18 PROCEDURE — 1160F RVW MEDS BY RX/DR IN RCRD: CPT | Mod: CPTII,95,, | Performed by: NURSE PRACTITIONER

## 2022-03-18 RX ORDER — SERTRALINE HYDROCHLORIDE 100 MG/1
100 TABLET, FILM COATED ORAL DAILY
Qty: 30 TABLET | Refills: 0 | Status: SHIPPED | OUTPATIENT
Start: 2022-03-18 | End: 2022-03-28

## 2022-03-18 RX ORDER — LISDEXAMFETAMINE DIMESYLATE 50 MG/1
50 CAPSULE ORAL EVERY MORNING
Qty: 30 CAPSULE | Refills: 0 | Status: SHIPPED | OUTPATIENT
Start: 2022-04-18 | End: 2022-05-05

## 2022-03-18 RX ORDER — LISDEXAMFETAMINE DIMESYLATE 50 MG/1
50 CAPSULE ORAL EVERY MORNING
Qty: 30 CAPSULE | Refills: 0 | Status: SHIPPED | OUTPATIENT
Start: 2022-03-18 | End: 2022-04-17

## 2022-03-18 RX ORDER — LISDEXAMFETAMINE DIMESYLATE 50 MG/1
50 CAPSULE ORAL EVERY MORNING
Qty: 30 CAPSULE | Refills: 0 | Status: SHIPPED | OUTPATIENT
Start: 2022-05-18 | End: 2022-06-20 | Stop reason: SDUPTHER

## 2022-03-18 NOTE — PROGRESS NOTES
Outpatient Psychiatry Follow-Up Visit (MD/NP)      Disclaimer: Evaluation and treatment is based on information presented to date. Any new information may affect assessment and findings.     The patient location is: work      Visit type: audiovisual      52 minutes of total time spent on the encounter, which includes face to face time and non-face to face time preparing to see the patient (eg, review of tests), Obtaining and/or reviewing separately obtained history, Documenting clinical information in the electronic or other health record, Independently interpreting results (not separately reported) and communicating results to the patient/family/caregiver, or Care coordination (not separately reported).         Each patient to whom he or she provides medical services by telemedicine is:  (1) informed of the relationship between the physician and patient and the respective role of any other health care provider with respect to management of the patient; and (2) notified that he or she may decline to receive medical services by telemedicine and may withdraw from such care at any time.    .     Crisis Disclaimer: Patient was informed that due to the virtual nature of the visit, that if a crisis develops, protocols will be implemented to ensure patient safety, including but not limited to: 1) Initiating a welfare check with local Law Enforcement, 2) Calling 1/National Crisis Hotline, and/or 3) Initiating PEC/CEC procedures.         Clinical Status of Patient:  Outpatient (Ambulatory)    Chief Complaint:  Brook Burdick is a 41 y.o. female who presents today for follow-up of depression, anxiety and attention problems.  Met with patient.      Interval History and Content of Current Session:  Interim Events/Subjective Report/Content of Current Session: reviewed questionnaires with patient. KATHLEEN, PHQ    PHQ 9 =  16  KATHLEEN 7 = 9    She has not been off of her zoloft.  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, no social interraction, and need to get back to your in school"  She feels it is do to Covid; " I was hard on myself before and covid got me out of routine and habit of doing things"  My appetite has changed, last night I did not eat  "though I have been gaining weight, and this made me down, I just want to eat sugars"  She went to bariatric clinic  Does not want surgery    Therapeutic Intervention Used: supportive psychotherapy    Target Symptom:  anxiety, depression, coping skills  Therapeutic modalities:  Insight oriented psychotherapy, Supportive psychotherapy  Why chosen therapy appropriate versus another modality:  Relevant to diagnosis; evidenced-based practice  Topics Discussed/Themes: building skill set for management; symptom management; symptom recognition; nutrition and exercise  Patient response to intervention: accepting  Patient progress toward goals: positive progress  Duration of Intervention:    16  min.              She is wanting to try different ssri  As she feels anxiety is worse  Discussed lexapro                Depressive Disorder: REPORTS depressed mood, appetite/weight change, tired/fatigued, concentration problems.   Anxiety Disorder: hyperarousal symptoms, fatigue, irritability, muscle tension, excessive worry.   Manic Disorder: DENIES none.   Psychotic Disorder: DENIES none.   Substance Use:  DENIES none.   Physical or Sexual Abuse: denies history of physical or sexual abuse       Review of Systems   · PSYCHIATRIC: Pertinant items are noted in the narrative.    Past Medical, Family and Social History: The patient's past medical, family and social " history have been reviewed and updated as appropriate within the electronic medical record - see encounter notes.    Compliance: yes    Side effects: None    Risk Parameters:  Patient reports no suicidal ideation  Patient reports no homicidal ideation  Patient reports no self-injurious behavior  Patient reports no violent behavior    Exam (detailed: at least 9 elements; comprehensive: all 15 elements)   Constitutional  Vitals:  Most recent vital signs, dated less than 90 days prior to this appointment, were reviewed.   Last 3 sets of Vitals    Vitals - 1 value per visit 3/14/2022 3/16/2022 3/16/2022   SYSTOLIC 127 - 122   DIASTOLIC 77 - 84   Pulse 75 - 74   Temp 97.2 - -   Resp 18 - 20   SPO2 97 - -   Weight (lb) 352.74 - 361.78   Weight (kg) 160 - 164.1   Height 72 - 72   BMI (Calculated) 47.8 - 49.1   VISIT REPORT - - -   Pain Score  - 9 -   Some recent data might be hidden          General:  unremarkable, age appropriate, casually dressed, neatly groomed     Musculoskeletal  Muscle Strength/Tone:  not examined   Gait & Station:  non-ataxic     Psychiatric  Speech:  no latency; no press   Mood & Affect:  steady  congruent and appropriate   Thought Process:  normal and logical   Associations:  intact   Thought Content:  normal, no suicidality, no homicidality, delusions, or paranoia   Insight:  intact, has awareness of illness   Judgement: behavior is adequate to circumstances   Orientation:  grossly intact   Memory: intact for content of interview   Language: grossly intact   Attention Span & Concentration:  able to focus   Fund of Knowledge:  intact and appropriate to age and level of education, familiar with aspects of current personal life     Assessment and Diagnosis   Status/Progress: Based on the examination today, the patient's problem(s) is/are worsening.  New problems have not been presented today.   Co-morbidities are complicating management of the primary condition.  There are no active rule-out  diagnoses for this patient at this time.     General Impression:     Encounter Diagnoses   Name Primary?    ADHD (attention deficit hyperactivity disorder), combined type     Moderate recurrent major depression Yes    Generalized anxiety disorder with panic attacks          Intervention/Counseling/Treatment Plan   · Medication Management: The risks and benefits of medication were discussed with the patient.  · Taper zoloft to 100 mg daily x 1 week  · Then will taper to 50 mg, add lexapro 10 mg  · Increase vyvanse to 50 mg for binge eating    Reviewed pathophysiology of depression and anxiety. Discussed rationale behind treatment. Reviewed treatment options, including medication and psychotherapy. Reviewed pharmacology including onset of action, dosing, side effects, adverse reactions and duration of therapy (6-12 months).  Discussed the need to stay on medication if it is effective and not discontinue after a few weeks due to the probability of relapse.    Pt expressed appreciation for the visit today and did not have further question at this time though pt  was still informed to:     Call / Walk In if problems.    Call Report Side Effects to Psyc MD     Encouraged to follow up with primary care / Gen Med MD for continued monitoring of general health and wellness.    Call 911  Or go to ER if Acute Concerns (especially if any thoughts of harm to self or other).     Understanding was expressed; and no further concerns nor questions were raised at this time.        Return to Clinic: 2 weeks

## 2022-03-19 NOTE — TELEPHONE ENCOUNTER
Refill Routing Note   Medication(s) are not appropriate for processing by [facility] for the following reason(s):      - Drug-Disease Interaction (montelukast and Anxiety and depression; Generalized anxiety disorder with panic attacks)    [facility] action(s):  Defer Medication-related problems identified:   Requires labs  Drug-disease interaction     Medication Therapy Plan: CDMR. LABS(A1-c). DDI; defer to you  --->Care Gap information included in message below if applicable.   Medication reconciliation completed: No   Automatic Epic Generated Protocol Data:        Requested Prescriptions   Pending Prescriptions Disp Refills    montelukast (SINGULAIR) 10 mg tablet [Pharmacy Med Name: MONTELUKAST 10MG TABLETS] 90 tablet 3     Sig: TAKE 1 TABLET(10 MG) BY MOUTH EVERY EVENING       Pulmonology:  Leukotriene Inhibitors Passed - 3/11/2022 10:13 AM        Passed - Patient is at least 18 years old        Passed - Negative Pregnancy Status Check        Passed - Valid encounter within last 15 months     Recent Visits  Date Type Provider Dept   03/11/22 Office Visit Yoan Henry MD [facility]   11/22/21 Office Visit Yoan Henry MD [facility]   11/05/21 Office Visit Yoan Henry MD [facility]   08/04/21 Office Visit Adamaris Arguello MD [facility]   06/02/21 Office Visit Adamaris Arguello MD [facility]   04/26/21 Office Visit Adamaris Arguello MD [facility]   03/18/21 Office Visit Adamaris Arguello MD [facility]   02/22/21 Office Visit Adamaris Arguello MD [facility]   01/14/21 Office Visit Adamaris Arguello MD [facility]   11/11/20 Office Visit Adamaris Arguello MD [facility]   Showing recent visits within past 720 days and meeting all other requirements  Future Appointments  No visits were found meeting these conditions.  Showing future appointments within next 150 days and meeting all  other requirements      Future Appointments              In 1 week MD Norberto Barahona - Orthopedics, [facility]    In 2 weeks MD Norberto Boone - Interventional Pain, [facility]                      Appointments  past 12m or future 3m with PCP    Date Provider   Last Visit   3/11/2022 Yoan Henry MD   Next Visit   Visit date not found Yoan Henry MD   ED visits in past 90 days: 0        Note composed:6:04 PM 03/19/2022

## 2022-03-21 RX ORDER — MONTELUKAST SODIUM 10 MG/1
TABLET ORAL
Qty: 90 TABLET | Refills: 3 | Status: SHIPPED | OUTPATIENT
Start: 2022-03-21 | End: 2022-06-20

## 2022-03-21 NOTE — PATIENT INSTRUCTIONS
"Depression saps a person's energy to do just about anything--even activities they enjoy. As a result, people with depression tend to become less active, which causes the depression to worsen. However, even a little bit of activity can help stop this cycle.          1. Choose activities you are likely to complete.         exercise  walk, go for a bike ride, weightlift, follow an exercise video, swim, practice yoga    socialize  call or text a friend, organize a group dinner, visit family, join a club / group    responsibilities  cleaning / housework, pay bills, professional development, homework    hobbies  sports, gardening, drawing, playing music, hiking, playing with a pet, cooking    personal care  dress up, get a haircut, prepare a healthy meal, tend to spiritual needs           2. Practice your chosen activities. Use the following tips to improve consistency.            start small  If needed, break activities into smaller pieces. Some activity is better than none.    make a plan  Set an alarm as a reminder, or tie an activity to something you already do. For example, practice a hobby immediately after dinner every day.    bring a friend  Including a friend will increase your commitment and make things more fun.          Social Support   Social isolation is a common symptom of depression. Related issues--such as fatigue, lowered self-esteem, and anxiety--exacerbate this problem. Resisting social isolation, and instead leaning on social support, can improve resilience to stress and depression.      Lean on your existing relationships. Make it a priority to socialize with friends or family every day. If this is proving difficult, or if no one is nearby, plan times to interact remotely. Try cooking together on a video call, playing a game together, or sharing a coffee over the phone.      2.  Say "yes" to socializing. Depression makes it tempting to stay home, isolated from friends   and family. Make a habit of " "saying "yes" to social opportunities, even when you're tempted to stay in.      3. Join a support group. Support groups let you connect with others who are dealing with issues similar to yours. You'll benefit from sharing and receiving advice and support.      Three Good Things   Negative thinking is a defining feature of depression. Positive experiences are minimized, while negative experiences are magnified. Gratitude helps combat this tendency by shifting focus toward positive experiences, rather than negative ones.      Write about three positive experiences from your day. These experiences can be small ("The weather was perfect when I walked to work") or big ("I got a promotion at work").   Choose one of the following questions to answer about each of the three good things:    Why did this happen?    Why was this good thing meaningful?    How can I experience more of this good thing?      Repeat this exercise every day for 1 week.      Mindfulness   Mindfulness means paying attention to the present moment. It means taking a step back and noticing the world, and one's thoughts and feelings, without judgment. The goal of mindfulness is to simply observe. Mindfulness helps reduce the rumination and worry that often accompany depression.      One way to practice mindfulness is through meditation. During mindfulness meditation, you will simply sit and focus your attention on the sensation of breathing. By focusing on your breathing, you will put yourself in the here-and-now.      Time and Place   Find a quiet, comfortable place where you can practice mindfulness for 15 to 30 minutes every day. Frequent and consistent practice leads to the best results, but some practice is better than none.      Posture   Sit in a chair or lie down in a comfortable position. Close your eyes or let your gaze soften. Let your head, shoulders, arms, and legs relax. Adjust your posture whenever you feel uncomfortable.     Awareness of " Breath   Focus on your breathing. Notice the sensation of the air as it travels in through your nose and out through your mouth. Notice the gentle rise and fall of your belly.      Wandering Mind   During meditation, it's normal for the mind to wander. When this happens, gently turn your attention back to your breathing. You may need to do this frequently throughout your

## 2022-03-23 ENCOUNTER — PATIENT MESSAGE (OUTPATIENT)
Dept: INTERNAL MEDICINE | Facility: CLINIC | Age: 42
End: 2022-03-23
Payer: COMMERCIAL

## 2022-03-28 ENCOUNTER — OFFICE VISIT (OUTPATIENT)
Dept: PSYCHIATRY | Facility: CLINIC | Age: 42
End: 2022-03-28
Payer: COMMERCIAL

## 2022-03-28 DIAGNOSIS — F41.0 GENERALIZED ANXIETY DISORDER WITH PANIC ATTACKS: ICD-10-CM

## 2022-03-28 DIAGNOSIS — F33.1 MODERATE RECURRENT MAJOR DEPRESSION: Primary | ICD-10-CM

## 2022-03-28 DIAGNOSIS — F41.1 GENERALIZED ANXIETY DISORDER WITH PANIC ATTACKS: ICD-10-CM

## 2022-03-28 PROCEDURE — 99214 OFFICE O/P EST MOD 30 MIN: CPT | Mod: 95,,, | Performed by: NURSE PRACTITIONER

## 2022-03-28 PROCEDURE — 99214 PR OFFICE/OUTPT VISIT, EST, LEVL IV, 30-39 MIN: ICD-10-PCS | Mod: 95,,, | Performed by: NURSE PRACTITIONER

## 2022-03-28 PROCEDURE — 1160F PR REVIEW ALL MEDS BY PRESCRIBER/CLIN PHARMACIST DOCUMENTED: ICD-10-PCS | Mod: CPTII,95,, | Performed by: NURSE PRACTITIONER

## 2022-03-28 PROCEDURE — 1159F PR MEDICATION LIST DOCUMENTED IN MEDICAL RECORD: ICD-10-PCS | Mod: CPTII,95,, | Performed by: NURSE PRACTITIONER

## 2022-03-28 PROCEDURE — 1160F RVW MEDS BY RX/DR IN RCRD: CPT | Mod: CPTII,95,, | Performed by: NURSE PRACTITIONER

## 2022-03-28 PROCEDURE — 1159F MED LIST DOCD IN RCRD: CPT | Mod: CPTII,95,, | Performed by: NURSE PRACTITIONER

## 2022-03-28 RX ORDER — ESCITALOPRAM OXALATE 10 MG/1
10 TABLET ORAL DAILY
Qty: 30 TABLET | Refills: 11 | Status: SHIPPED | OUTPATIENT
Start: 2022-03-28 | End: 2022-06-20 | Stop reason: SDUPTHER

## 2022-03-28 NOTE — PROGRESS NOTES
Outpatient Psychiatry Follow-Up Visit (MD/NP)      Disclaimer: Evaluation and treatment is based on information presented to date. Any new information may affect assessment and findings.     The patient location is: car      Visit type: audiovisual    36 minutes of total time spent on the encounter, which includes face to face time and non-face to face time preparing to see the patient (eg, review of tests), Obtaining and/or reviewing separately obtained history, Documenting clinical information in the electronic or other health record, Independently interpreting results (not separately reported) and communicating results to the patient/family/caregiver, or Care coordination (not separately reported).         Each patient to whom he or she provides medical services by telemedicine is:  (1) informed of the relationship between the physician and patient and the respective role of any other health care provider with respect to management of the patient; and (2) notified that he or she may decline to receive medical services by telemedicine and may withdraw from such care at any time.        Crisis Disclaimer: Patient was informed that due to the virtual nature of the visit, that if a crisis develops, protocols will be implemented to ensure patient safety, including but not limited to: 1) Initiating a welfare check with local Law Enforcement, 2) Calling 911/National Crisis Hotline, and/or 3) Initiating PEC/CEC procedures.         Clinical Status of Patient:  Outpatient (Ambulatory)    Chief Complaint:  Brook Burdick is a 41 y.o. female who presents today for follow-up of depression, anxiety and attention problems.  Met with patient.      Interval History and Content of Current Session:  Interim Events/Subjective Report/Content of Current Session: reviewed questionnaires with patient. KATHLEEN, PHQ    PHQ 9 =  15  KATHLEEN 7 = 10    Follow up from last visit, with     · Medication Management: The risks and benefits of  medication were discussed with the patient.  · Taper zoloft to 100 mg daily x 1 week  · Then will taper to 50 mg, 25 mg  Then add lexpro 10 at 25 mg  · Increase vyvanse to 50 mg for binge eating    She did not notice any changes or withdrawal  with the decrease to 50 mg of zoloft  Had hectic week, very stressed with work and home life  Feels zoloft was not working and wanted to change    Did not received change in dosage for vyvanse, encouraged to contact pharmacy.  Increased to 50 mg for binge eating and ADHD sx    Sleeping 8 hours    Continues with depressive sx and anxiety    Denies suicidal or homicidal ideations; denies self-injury, cutting or burning            Depressive Disorder: REPORTS depressed mood, irritable mood, appetite/weight change, tired/fatigued.   Anxiety Disorder: hyperarousal symptoms, irritability, muscle tension, excessive worry.   Manic Disorder: DENIES none.   Psychotic Disorder: DENIES none.   Substance Use:  DENIES none.   Physical or Sexual Abuse: denies history of physical or sexual abuse       Review of Systems   · PSYCHIATRIC: Pertinant items are noted in the narrative.    Past Medical, Family and Social History: The patient's past medical, family and social history have been reviewed and updated as appropriate within the electronic medical record - see encounter notes.    Compliance: yes    Side effects: None    Risk Parameters:  Patient reports no suicidal ideation  Patient reports no homicidal ideation  Patient reports no self-injurious behavior  Patient reports no violent behavior    Exam (detailed: at least 9 elements; comprehensive: all 15 elements)   Constitutional  Vitals:  Most recent vital signs, dated less than 90 days prior to this appointment, were reviewed.   Last 3 sets of Vitals    Vitals - 1 value per visit 3/14/2022 3/16/2022 3/16/2022   SYSTOLIC 127 - 122   DIASTOLIC 77 - 84   Pulse 75 - 74   Temp 97.2 - -   Resp 18 - 20   SPO2 97 - -   Weight (lb) 352.74 - 361.78    Weight (kg) 160 - 164.1   Height 72 - 72   BMI (Calculated) 47.8 - 49.1   VISIT REPORT - - -   Pain Score  - 9 -   Some recent data might be hidden          General:  unremarkable, age appropriate, casually dressed, neatly groomed     Musculoskeletal  Muscle Strength/Tone:  not examined   Gait & Station:  non-ataxic     Psychiatric  Speech:  no latency; no press   Mood & Affect:  steady  congruent and appropriate   Thought Process:  normal and logical, abstract, goal-directed   Associations:  intact   Thought Content:  normal, no suicidality, no homicidality, delusions, or paranoia   Insight:  intact, has awareness of illness   Judgement: behavior is adequate to circumstances   Orientation:  grossly intact   Memory: intact for content of interview   Language: grossly intact   Attention Span & Concentration:  able to focus   Fund of Knowledge:  intact and appropriate to age and level of education, familiar with aspects of current personal life     Assessment and Diagnosis   Status/Progress: Based on the examination today, the patient's problem(s) is/are failing to respond as expected to treatment.  New problems have not been presented today.   Co-morbidities are complicating management of the primary condition.  There are no active rule-out diagnoses for this patient at this time.     General Impression:     Encounter Diagnoses   Name Primary?    Moderate recurrent major depression Yes    Generalized anxiety disorder with panic attacks          Intervention/Counseling/Treatment Plan   · Medication Management: The risks and benefits of medication were discussed with the patient.  · Taper zoloft 50 mg x 4 days, then 25 mg x 4 days then stop  · Add lexapro 10 mg when on zoloft 25 mg for cross taper.  · Continue vyvanse 50 mg for binge eating and ADHD    Reviewed pathophysiology of depression and anxiety. Discussed rationale behind treatment. Reviewed treatment options, including medication and psychotherapy.  Reviewed pharmacology including onset of action, dosing, side effects, adverse reactions and duration of therapy (6-12 months).  Discussed the need to stay on medication if it is effective and not discontinue after a few weeks due to the probability of relapse.    Pt expressed appreciation for the visit today and did not have further question at this time though pt  was still informed to:     Call / Walk In if problems.    Call Report Side Effects to Psyc MD     Encouraged to follow up with primary care / Gen Med MD for continued monitoring of general health and wellness.    Call 911  Or go to ER if Acute Concerns (especially if any thoughts of harm to self or other).     Understanding was expressed; and no further concerns nor questions were raised at this time.        Return to Clinic: 2 weeks

## 2022-03-28 NOTE — PATIENT INSTRUCTIONS
"If you have been experiencing some of the following signs and symptoms most of the day, nearly every day, for at least two weeks, you may be suffering from depression:    Persistent sad, anxious, or empty mood  Feelings of hopelessness, or pessimism  Irritability  Feelings of guilt, worthlessness, or helplessness  Loss of interest or pleasure in hobbies and activities  Decreased energy or fatigue  Moving or talking more slowly  Feeling restless or having trouble sitting still  Difficulty concentrating, remembering, or making decisions  Difficulty sleeping, early-morning awakening, or oversleeping  Appetite and/or weight changes  Thoughts of death or suicide, or suicide attempts  Aches or pains, headaches, cramps, or digestive problems without a clear physical cause and/or that do not ease even with treatment  Not everyone who is depressed experiences every symptom. Some people experience only a few symptoms while others may experience many. Several persistent symptoms in addition to low mood are required for a diagnosis of major depression, but people with only a few - but distressing - symptoms may benefit from treatment of their subsyndromal depression. The severity and frequency of symptoms and how long they last will vary depending on the individual and his or her particular illness. Symptoms may also vary depending on the stage of the illness.      Treatment and Therapies  Depression, even the most severe cases, can be treated. The earlier that treatment can begin, the more effective it is. Depression is usually treated with medications, psychotherapy, or a combination of the two. If these treatments do not reduce symptoms,  and other brain stimulation therapies may be options to explore.      Quick Tip: No two people are affected the same way by depression and there is no "one-size-fits-all" for treatment. It may take some trial and error to find the treatment that works best for " you.      Depression  Overview  Depression (major depressive disorder or clinical depression) is a common but serious mood disorder. It causes severe symptoms that affect how you feel, think, and handle daily activities, such as sleeping, eating, or working. To be diagnosed with depression, the symptoms must be present for at least two weeks.    Some forms of depression are slightly different, or they may develop under unique circumstances, such as:    Persistent depressive disorder (also called dysthymia) is a depressed mood that lasts for at least two years. A person diagnosed with persistent depressive disorder may have episodes of major depression along with periods of less severe symptoms, but symptoms must last for two years to be considered persistent depressive disorder.  Postpartum depression is much more serious than the baby blues (relatively mild depressive and anxiety symptoms that typically clear within two weeks after delivery) that many women experience after giving birth. Women with postpartum depression experience full-blown major depression during pregnancy or after delivery (postpartum depression). The feelings of extreme sadness, anxiety, and exhaustion that accompany postpartum depression may make it difficult for these new mothers to complete daily care activities for themselves and/or for their babies.  Psychotic depression occurs when a person has severe depression plus some form of psychosis, such as having disturbing false fixed beliefs (delusions) or hearing or seeing upsetting things that others cannot hear or see (hallucinations). The psychotic symptoms typically have a depressive theme, such as delusions of guilt, poverty, or illness.  Seasonal affective disorder is characterized by the onset of depression during the winter months, when there is less natural sunlight. This depression generally lifts during spring and summer. Winter depression, typically accompanied by social  withdrawal, increased sleep, and weight gain, predictably returns every year in seasonal affective disorder.  Bipolar disorder is different from depression, but it is included in this list is because someone with bipolar disorder experiences episodes of extremely low moods that meet the criteria for major depression (called bipolar depression). But a person with bipolar disorder also experiences extreme high - euphoric or irritable - moods called jorge l or a less severe form called hypomania.  Examples of other types of depressive disorders newly added to the diagnostic classification of DSM-5 include disruptive mood dysregulation disorder (diagnosed in children and adolescents) and premenstrual dysphoric disorder (PMDD).    Signs and Symptoms  The Centers for Disease Control and Prevention (CDC) has recognized that having certain mental disorders, including depression and schizophrenia, can make people more likely to get severely ill from COVID-19. Learn more about getting help and finding a health care provider on [facility]'s Help for Mental Illnesses webpage.  If you have been experiencing some of the following signs and symptoms most of the day, nearly every day, for at least two weeks, you may be suffering from depression:    Persistent sad, anxious, or empty mood  Feelings of hopelessness, or pessimism  Irritability  Feelings of guilt, worthlessness, or helplessness  Loss of interest or pleasure in hobbies and activities  Decreased energy or fatigue  Moving or talking more slowly  Feeling restless or having trouble sitting still  Difficulty concentrating, remembering, or making decisions  Difficulty sleeping, early-morning awakening, or oversleeping  Appetite and/or weight changes  Thoughts of death or suicide, or suicide attempts  Aches or pains, headaches, cramps, or digestive problems without a clear physical cause and/or that do not ease even with treatment  Not everyone who is depressed experiences  every symptom. Some people experience only a few symptoms while others may experience many. Several persistent symptoms in addition to low mood are required for a diagnosis of major depression, but people with only a few - but distressing - symptoms may benefit from treatment of their subsyndromal depression. The severity and frequency of symptoms and how long they last will vary depending on the individual and his or her particular illness. Symptoms may also vary depending on the stage of the illness.    Risk Factors  Depression is one of the most common mental disorders in the [place] Current research suggests that depression is caused by a combination of genetic, biological, environmental, and psychological factors.    Depression can happen at any age, but often begins in adulthood. Depression is now recognized as occurring in children and adolescents, although it sometimes presents with more prominent irritability than low mood. Many chronic mood and anxiety disorders in adults begin as high levels of anxiety in children.    Depression, especially in midlife or older adults, can co-occur with other serious medical illnesses, such as diabetes, cancer, heart disease, and Parkinsons disease. These conditions are often worse when depression is present. Sometimes medications taken for these physical illnesses may cause side effects that contribute to depression. A doctor experienced in treating these complicated illnesses can help work out the best treatment strategy.    Risk factors include:    Personal or family history of depression  Major life changes, trauma, or stress  Certain physical illnesses and medications  Treatment and Therapies  Depression, even the most severe cases, can be treated. The earlier that treatment can begin, the more effective it is. Depression is usually treated with medications, psychotherapy, or a combination of the two. If these treatments do not reduce symptoms, electroconvulsive  "therapy (ECT) and other brain stimulation therapies may be options to explore.    Quick Tip: No two people are affected the same way by depression and there is no "one-size-fits-all" for treatment. It may take some trial and error to find the treatment that works best for you.    Medications  Antidepressants are medicines that treat depression. They may help improve the way your brain uses certain chemicals that control mood or stress. You may need to try several different antidepressant medicines before finding the one that improves your symptoms and has manageable side effects. A medication that has helped you or a close family member in the past will often be considered.    Antidepressants take time - usually 2 to 4 weeks - to work, and often, symptoms such as sleep, appetite, and concentration problems improve before mood lifts, so it is important to give medication a chance before reaching a conclusion about its effectiveness. If you begin taking antidepressants, do not stop taking them without the help of a doctor. Sometimes people taking antidepressants feel better and then stop taking the medication on their own, and the depression returns. When you and your doctor have decided it is time to stop the medication, usually after a course of 6 to 12 months, the doctor will help you slowly and safely decrease your dose. Stopping them abruptly can cause withdrawal symptoms.    Psychotherapies  Several types of psychotherapy (also called talk therapy or, in a less specific form, counseling) can help people with depression. Examples of evidence-based approaches specific to the treatment of depression include cognitive-behavioral therapy (CBT), interpersonal therapy (IPT), and problem-solving therapy.    Beyond Treatment: Things You Can Do  Here are other tips that may help you or a loved one during treatment for depression:    Try to be active and exercise.  Set realistic goals for yourself.  Try to spend time " with other people and confide in a trusted friend or relative.  Try not to isolate yourself, and let others help you.  Expect your mood to improve gradually, not immediately.  Postpone important decisions, such as getting  or , or changing jobs until you feel better. Discuss decisions with others who know you well and have a more objective view of your situation.  Continue to educate yourself about depression.

## 2022-03-30 ENCOUNTER — OFFICE VISIT (OUTPATIENT)
Dept: ORTHOPEDICS | Facility: CLINIC | Age: 42
End: 2022-03-30
Payer: COMMERCIAL

## 2022-03-30 VITALS — BODY MASS INDEX: 39.68 KG/M2 | HEIGHT: 72 IN | WEIGHT: 293 LBS

## 2022-03-30 DIAGNOSIS — R20.0 NUMBNESS AND TINGLING IN BOTH HANDS: Primary | ICD-10-CM

## 2022-03-30 DIAGNOSIS — M79.641 HAND PAIN, RIGHT: ICD-10-CM

## 2022-03-30 DIAGNOSIS — R20.2 NUMBNESS AND TINGLING IN BOTH HANDS: Primary | ICD-10-CM

## 2022-03-30 PROCEDURE — 3008F PR BODY MASS INDEX (BMI) DOCUMENTED: ICD-10-PCS | Mod: CPTII,S$GLB,, | Performed by: ORTHOPAEDIC SURGERY

## 2022-03-30 PROCEDURE — 99999 PR PBB SHADOW E&M-EST. PATIENT-LVL IV: CPT | Mod: PBBFAC,,, | Performed by: ORTHOPAEDIC SURGERY

## 2022-03-30 PROCEDURE — 99999 PR PBB SHADOW E&M-EST. PATIENT-LVL IV: ICD-10-PCS | Mod: PBBFAC,,, | Performed by: ORTHOPAEDIC SURGERY

## 2022-03-30 PROCEDURE — 1159F PR MEDICATION LIST DOCUMENTED IN MEDICAL RECORD: ICD-10-PCS | Mod: CPTII,S$GLB,, | Performed by: ORTHOPAEDIC SURGERY

## 2022-03-30 PROCEDURE — 3008F BODY MASS INDEX DOCD: CPT | Mod: CPTII,S$GLB,, | Performed by: ORTHOPAEDIC SURGERY

## 2022-03-30 PROCEDURE — 99203 OFFICE O/P NEW LOW 30 MIN: CPT | Mod: S$GLB,,, | Performed by: ORTHOPAEDIC SURGERY

## 2022-03-30 PROCEDURE — 1159F MED LIST DOCD IN RCRD: CPT | Mod: CPTII,S$GLB,, | Performed by: ORTHOPAEDIC SURGERY

## 2022-03-30 PROCEDURE — 99203 PR OFFICE/OUTPT VISIT, NEW, LEVL III, 30-44 MIN: ICD-10-PCS | Mod: S$GLB,,, | Performed by: ORTHOPAEDIC SURGERY
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
## 2022-04-06 ENCOUNTER — PATIENT MESSAGE (OUTPATIENT)
Dept: PAIN MEDICINE | Facility: CLINIC | Age: 42
End: 2022-04-06

## 2022-04-06 ENCOUNTER — PATIENT MESSAGE (OUTPATIENT)
Dept: PAIN MEDICINE | Facility: CLINIC | Age: 42
End: 2022-04-06
Payer: COMMERCIAL

## 2022-04-06 ENCOUNTER — OFFICE VISIT (OUTPATIENT)
Dept: PAIN MEDICINE | Facility: CLINIC | Age: 42
End: 2022-04-06
Payer: COMMERCIAL

## 2022-04-06 DIAGNOSIS — G89.29 CHRONIC PAIN OF LEFT KNEE: ICD-10-CM

## 2022-04-06 DIAGNOSIS — M25.561 CHRONIC PAIN OF BOTH KNEES: ICD-10-CM

## 2022-04-06 DIAGNOSIS — M54.12 CERVICAL RADICULOPATHY: ICD-10-CM

## 2022-04-06 DIAGNOSIS — M54.16 LUMBAR RADICULOPATHY: ICD-10-CM

## 2022-04-06 DIAGNOSIS — M25.562 CHRONIC PAIN OF BOTH KNEES: ICD-10-CM

## 2022-04-06 DIAGNOSIS — M25.562 CHRONIC PAIN OF LEFT KNEE: ICD-10-CM

## 2022-04-06 DIAGNOSIS — M79.18 CHRONIC MYOFASCIAL PAIN: Primary | ICD-10-CM

## 2022-04-06 DIAGNOSIS — E66.01 MORBID OBESITY WITH BMI OF 40.0-44.9, ADULT: ICD-10-CM

## 2022-04-06 DIAGNOSIS — G89.29 CHRONIC MYOFASCIAL PAIN: Primary | ICD-10-CM

## 2022-04-06 DIAGNOSIS — M79.12 MYALGIA OF AUXILIARY MUSCLES, HEAD AND NECK: ICD-10-CM

## 2022-04-06 DIAGNOSIS — G89.29 CHRONIC PAIN OF BOTH KNEES: ICD-10-CM

## 2022-04-06 DIAGNOSIS — M47.816 LUMBAR SPONDYLOSIS: ICD-10-CM

## 2022-04-06 PROCEDURE — 1159F PR MEDICATION LIST DOCUMENTED IN MEDICAL RECORD: ICD-10-PCS | Mod: CPTII,95,, | Performed by: PHYSICAL MEDICINE & REHABILITATION

## 2022-04-06 PROCEDURE — 1159F MED LIST DOCD IN RCRD: CPT | Mod: CPTII,95,, | Performed by: PHYSICAL MEDICINE & REHABILITATION

## 2022-04-06 PROCEDURE — 1160F PR REVIEW ALL MEDS BY PRESCRIBER/CLIN PHARMACIST DOCUMENTED: ICD-10-PCS | Mod: CPTII,95,, | Performed by: PHYSICAL MEDICINE & REHABILITATION

## 2022-04-06 PROCEDURE — 1160F RVW MEDS BY RX/DR IN RCRD: CPT | Mod: CPTII,95,, | Performed by: PHYSICAL MEDICINE & REHABILITATION

## 2022-04-06 PROCEDURE — 99214 OFFICE O/P EST MOD 30 MIN: CPT | Mod: 95,,, | Performed by: PHYSICAL MEDICINE & REHABILITATION

## 2022-04-06 PROCEDURE — 99214 PR OFFICE/OUTPT VISIT, EST, LEVL IV, 30-39 MIN: ICD-10-PCS | Mod: 95,,, | Performed by: PHYSICAL MEDICINE & REHABILITATION

## 2022-04-06 RX ORDER — TIZANIDINE 4 MG/1
TABLET ORAL
Qty: 60 TABLET | Refills: 1 | Status: SHIPPED | OUTPATIENT
Start: 2022-04-06 | End: 2022-08-04

## 2022-04-06 RX ORDER — PREGABALIN 150 MG/1
150 CAPSULE ORAL 2 TIMES DAILY
Qty: 60 CAPSULE | Refills: 2 | Status: SHIPPED | OUTPATIENT
Start: 2022-04-06 | End: 2022-08-05 | Stop reason: SDUPTHER
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
## 2022-04-22 ENCOUNTER — OFFICE VISIT (OUTPATIENT)
Dept: URGENT CARE | Facility: CLINIC | Age: 42
End: 2022-04-22
Payer: COMMERCIAL

## 2022-04-22 VITALS
HEART RATE: 79 BPM | BODY MASS INDEX: 39.68 KG/M2 | TEMPERATURE: 97 F | WEIGHT: 293 LBS | SYSTOLIC BLOOD PRESSURE: 136 MMHG | RESPIRATION RATE: 18 BRPM | OXYGEN SATURATION: 97 % | DIASTOLIC BLOOD PRESSURE: 76 MMHG | HEIGHT: 72 IN

## 2022-04-22 DIAGNOSIS — R52 BODY ACHES: ICD-10-CM

## 2022-04-22 DIAGNOSIS — J06.9 VIRAL URI: Primary | ICD-10-CM

## 2022-04-22 LAB
CTP QC/QA: YES
CTP QC/QA: YES
POC MOLECULAR INFLUENZA A AGN: NEGATIVE
POC MOLECULAR INFLUENZA B AGN: NEGATIVE
SARS-COV-2 RDRP RESP QL NAA+PROBE: NEGATIVE

## 2022-04-22 PROCEDURE — 3008F BODY MASS INDEX DOCD: CPT | Mod: CPTII,S$GLB,, | Performed by: EMERGENCY MEDICINE

## 2022-04-22 PROCEDURE — U0002: ICD-10-PCS | Mod: QW,S$GLB,, | Performed by: EMERGENCY MEDICINE

## 2022-04-22 PROCEDURE — 3075F PR MOST RECENT SYSTOLIC BLOOD PRESS GE 130-139MM HG: ICD-10-PCS | Mod: CPTII,S$GLB,, | Performed by: EMERGENCY MEDICINE

## 2022-04-22 PROCEDURE — 1159F MED LIST DOCD IN RCRD: CPT | Mod: CPTII,S$GLB,, | Performed by: EMERGENCY MEDICINE

## 2022-04-22 PROCEDURE — 99213 PR OFFICE/OUTPT VISIT, EST, LEVL III, 20-29 MIN: ICD-10-PCS | Mod: S$GLB,,, | Performed by: EMERGENCY MEDICINE

## 2022-04-22 PROCEDURE — 3075F SYST BP GE 130 - 139MM HG: CPT | Mod: CPTII,S$GLB,, | Performed by: EMERGENCY MEDICINE

## 2022-04-22 PROCEDURE — 99213 OFFICE O/P EST LOW 20 MIN: CPT | Mod: S$GLB,,, | Performed by: EMERGENCY MEDICINE

## 2022-04-22 PROCEDURE — 1159F PR MEDICATION LIST DOCUMENTED IN MEDICAL RECORD: ICD-10-PCS | Mod: CPTII,S$GLB,, | Performed by: EMERGENCY MEDICINE

## 2022-04-22 PROCEDURE — 87502 INFLUENZA DNA AMP PROBE: CPT | Mod: QW,S$GLB,, | Performed by: EMERGENCY MEDICINE

## 2022-04-22 PROCEDURE — 3078F PR MOST RECENT DIASTOLIC BLOOD PRESSURE < 80 MM HG: ICD-10-PCS | Mod: CPTII,S$GLB,, | Performed by: EMERGENCY MEDICINE

## 2022-04-22 PROCEDURE — U0002 COVID-19 LAB TEST NON-CDC: HCPCS | Mod: QW,S$GLB,, | Performed by: EMERGENCY MEDICINE

## 2022-04-22 PROCEDURE — 87502 POCT INFLUENZA A/B MOLECULAR: ICD-10-PCS | Mod: QW,S$GLB,, | Performed by: EMERGENCY MEDICINE

## 2022-04-22 PROCEDURE — 3008F PR BODY MASS INDEX (BMI) DOCUMENTED: ICD-10-PCS | Mod: CPTII,S$GLB,, | Performed by: EMERGENCY MEDICINE

## 2022-04-22 PROCEDURE — 3078F DIAST BP <80 MM HG: CPT | Mod: CPTII,S$GLB,, | Performed by: EMERGENCY MEDICINE

## 2022-04-22 PROCEDURE — 1160F RVW MEDS BY RX/DR IN RCRD: CPT | Mod: CPTII,S$GLB,, | Performed by: EMERGENCY MEDICINE

## 2022-04-22 PROCEDURE — 1160F PR REVIEW ALL MEDS BY PRESCRIBER/CLIN PHARMACIST DOCUMENTED: ICD-10-PCS | Mod: CPTII,S$GLB,, | Performed by: EMERGENCY MEDICINE

## 2022-04-22 RX ORDER — CYCLOBENZAPRINE HCL 10 MG
10 TABLET ORAL 2 TIMES DAILY PRN
COMMUNITY
Start: 2022-04-09 | End: 2022-05-05

## 2022-04-22 RX ORDER — PROMETHAZINE HYDROCHLORIDE AND DEXTROMETHORPHAN HYDROBROMIDE 6.25; 15 MG/5ML; MG/5ML
5 SYRUP ORAL EVERY 4 HOURS PRN
Qty: 180 ML | Refills: 0 | Status: SHIPPED | OUTPATIENT
Start: 2022-04-22 | End: 2022-05-02

## 2022-04-22 RX ORDER — MEDROXYPROGESTERONE ACETATE 10 MG/1
10 TABLET ORAL 2 TIMES DAILY
COMMUNITY
Start: 2022-01-07 | End: 2022-05-05

## 2022-04-22 RX ORDER — GABAPENTIN 300 MG/1
600 CAPSULE ORAL 2 TIMES DAILY PRN
COMMUNITY
Start: 2022-03-16 | End: 2022-05-05

## 2022-04-22 NOTE — PROGRESS NOTES
Subjective:       Patient ID: rBook Burdick is a 41 y.o. female.    Vitals:  height is 6' (1.829 m) and weight is 163.7 kg (361 lb) (abnormal). Her temperature is 97 °F (36.1 °C). Her blood pressure is 136/76 and her pulse is 79. Her respiration is 18 and oxygen saturation is 97%.     Chief Complaint: No chief complaint on file.    Pt states she has been having body aches, congestion, runny nose, cough, and diarrhea. Pt has been feeling bad since yesterday.No known sick contacts. C/o sore throat. Started with cough today. Had neg covid yesterday at work.     Other  This is a new problem. The current episode started yesterday. The problem occurs constantly. The problem has been unchanged. Associated symptoms include chills, congestion, coughing, fatigue, myalgias and a sore throat. Pertinent negatives include no abdominal pain, anorexia, arthralgias, change in bowel habit, chest pain, diaphoresis, fever, headaches, joint swelling, nausea, neck pain, numbness, rash, swollen glands, urinary symptoms, vertigo, visual change, vomiting or weakness. Nothing aggravates the symptoms. She has tried nothing for the symptoms. The treatment provided no relief.       Constitution: Positive for chills and fatigue. Negative for sweating and fever.   HENT: Positive for congestion and sore throat.    Neck: Negative for neck pain.   Cardiovascular: Negative for chest pain.   Respiratory: Positive for cough.    Gastrointestinal: Negative for abdominal pain, nausea and vomiting.   Musculoskeletal: Positive for muscle ache. Negative for joint pain and joint swelling.   Skin: Negative for rash.   Neurological: Negative for history of vertigo, headaches and numbness.       Objective:      Physical Exam   Constitutional: She is oriented to person, place, and time. She appears well-developed. She is cooperative.  Non-toxic appearance. She does not appear ill. No distress.   HENT:   Head: Normocephalic and atraumatic.   Ears:   Right Ear:  Hearing and external ear normal.   Left Ear: Hearing and external ear normal.   Nose: Congestion present. No mucosal edema, rhinorrhea or nasal deformity. No epistaxis. Right sinus exhibits no maxillary sinus tenderness and no frontal sinus tenderness. Left sinus exhibits no maxillary sinus tenderness and no frontal sinus tenderness.   Mouth/Throat: Uvula is midline and mucous membranes are normal. No trismus in the jaw. Normal dentition. No uvula swelling. Posterior oropharyngeal erythema present. No oropharyngeal exudate or posterior oropharyngeal edema.   Eyes: Conjunctivae and lids are normal. No scleral icterus.      extraocular movement intact   Neck: Trachea normal and phonation normal. Neck supple. No edema present. No erythema present. No neck rigidity present.   Cardiovascular: Normal rate, regular rhythm, normal heart sounds and normal pulses.   Pulmonary/Chest: Effort normal and breath sounds normal. No respiratory distress. She has no decreased breath sounds. She has no wheezes. She has no rhonchi.   Abdominal: Normal appearance.   Musculoskeletal: Normal range of motion.         General: No deformity. Normal range of motion.   Neurological: She is alert and oriented to person, place, and time. She exhibits normal muscle tone. Coordination normal.   Skin: Skin is warm, dry, intact, not diaphoretic and not pale.   Psychiatric: Her speech is normal and behavior is normal. Judgment and thought content normal.   Nursing note and vitals reviewed.        Assessment:       1. Viral URI    2. Body aches          Plan:         Viral URI  -     promethazine-dextromethorphan (PROMETHAZINE-DM) 6.25-15 mg/5 mL Syrp; Take 5 mLs by mouth every 4 (four) hours as needed (cough).  Dispense: 180 mL; Refill: 0    Body aches  -     POCT Influenza A/B MOLECULAR

## 2022-04-22 NOTE — PATIENT INSTRUCTIONS
Use zyrtec daily.  Flonase: 2 sprays per nostril 1-2 times a day. Continue astelin to dry nasal secretions.   Nasal saline drops: 2-4 drops per nostril 10-15 times a day. This will help with post-nasal drip and associated cough and sore throat.     Tylenol or Motrin for fever or pain per label instructions.  Consider use of OTC cough medicine like Robitussin DM. for daytime cough. Phenergan DM as prescribed for nighttime cough.  This medication is sedating.  Use with caution.    Warm saltwater gargles to 3 times a day.    Rest and drink plenty of fluids.  Avoid OTC decongestants if you have high blood pressure. This includes multi-cold symptom preparations.    Consider permissive fever to allow your immune system to work.  However, if fever is not tolerable or is disrupting sleep, use Tylenol or Motrin per label instructions.    You are considered contagious until your systemic, or whole body, symptoms have resolved fully for 24 hours.  This includes fever, body aches, fatigue.    Follow up in 2-3 days with PCP if no improvement or any worsening.       Viral Upper Respiratory Infection Discharge Instructions, Adult   About this topic   You have an upper respiratory infection or URI. A URI can affect your nose, throat, ears, and sinuses. A virus is the cause of almost all URIs and antibiotics will not help you feel better more quickly. The common cold is an example of a viral URI.  URIs are easy to spread from person to person, most often through coughing or sneezing. A URI will almost always get better in a week or two without any treatment.         What care is needed at home?   Ask your doctor what you need to do when you go home. Make sure you ask questions if you do not understand what the doctor says.  If you smoke, try to quit. Your doctor or nurse can help.  Drink lots of fluids like water, juice, or broth. This will help replace any fluids lost if you have a runny nose or fever. Warm tea or soup can help  soothe a sore throat.  If the air in your home feels dry, use a cool mist humidifier. This can help a stuffy nose and make it easier to breathe.  You can also use saline nose drops to relieve stuffiness.  If you decide to take over-the-counter cough or cold medicines, follow the directions on the label carefully. Be sure you do not take more than 1 medicine that contains acetaminophen. Also, if you have a heart problem or high blood pressure, check with your doctor before you take any of these medicines.  Wash your hands often. Cough or sneeze into a tissue or your elbow instead of your hands. This will help keep others healthy.  What follow-up care is needed?   Your doctor may ask you to make visits to the office to check on your progress. Be sure to keep these visits.  What drugs may be needed?   The doctor may order drugs to:  Open up the tubes of your lungs  Treat viral infection  Relieve or stop coughing  Help with pain from a sore throat  Relieve runny and stuffy nose  Provide oxygen  Will physical activity be limited?   You need to rest for a few days to let your body recover from the infection.  What changes to diet are needed?   Eat soft foods like soup if swallowing is too painful.  What problems could happen?   Asthma attack  Sinus infections  Lung problems like pneumonia and bronchitis  Severe fluid loss. This is dehydration.  What can be done to prevent this health problem?   Wash your hands often with soap and water for at least 20 seconds, especially after coughing or sneezing. Alcohol-based hand sanitizers also work to kill the virus.  If you are sick, cover your mouth and nose with tissue when you cough or sneeze. You can also cough into your elbow. Throw away tissues in the trash and wash your hands after touching used tissues.  Do not get too close (kissing, hugging) to people who are sick.  Do not share towels or hankies with anyone who is sick. Clean commonly handled things like door handles,  remotes, toys, and phones. Wipe them with a disinfectant.  Stay away from crowded places.  Cover your nose and mouth when you sneeze or cough.  Take vitamin C to help build up your body's ability to fight disease.  Get a flu shot each year.  When do I need to call the doctor?   You have trouble breathing when talking or sitting still.  You have a fever of 100.4°F (38°C) or higher for several days, chills, a very bad sore throat, or ear or sinus pain.  You develop a new fever after several days of feeling the same or improving.  You develop chest pain when you cough.  You have a cough that lasts more than 10 days.  You cough up blood, or the color of the mucus you cough up changes.  Teach Back: Helping You Understand   The Teach Back Method helps you understand the information we are giving you. After you talk with the staff, tell them in your own words what you learned. This helps to make sure the staff has described each thing clearly. It also helps to explain things that may have been confusing. Before going home, make sure you can do these:  I can tell you about my condition.  I can tell you what may help ease my signs.  I can tell you what I will do if I have a fever, chills, breathing very fast, or trouble breathing.  Where can I learn more?   American Lung Association  https://www.lung.org/blog/can-you-exercise-with-a-cold   American Lung Association  https://www.lung.org/lung-health-diseases/lung-disease-lookup/influenza/facts-about-the-common-cold   NHS Choices  https://www.nhs.uk/conditions/respiratory-tract-infection/   UpToDate  https://www.uptodate.com/contents/the-common-cold-in-adults-beyond-the-basics   Last Reviewed Date   2021-06-08  Consumer Information Use and Disclaimer   This information is not specific medical advice and does not replace information you receive from your health care provider. This is only a brief summary of general information. It does NOT include all information about  conditions, illnesses, injuries, tests, procedures, treatments, therapies, discharge instructions or life-style choices that may apply to you. You must talk with your health care provider for complete information about your health and treatment options. This information should not be used to decide whether or not to accept your health care providers advice, instructions or recommendations. Only your health care provider has the knowledge and training to provide advice that is right for you.  Copyright   Copyright © 2021 [employer] and its affiliates and/or licensors. All rights reserved.

## 2022-04-25 ENCOUNTER — TELEPHONE (OUTPATIENT)
Dept: URGENT CARE | Facility: CLINIC | Age: 42
End: 2022-04-25
Payer: COMMERCIAL

## 2022-04-25 NOTE — TELEPHONE ENCOUNTER
Contacted patient as courtesy call from recent Urgent Care visit on 04.22.2022, patient states she is still feeling a little congested and has a non stop cough. Patient is going to try to contact her PCP, if not she will come back to Urgent Care to be re-evaluated. /josselin/

## 2022-05-03 ENCOUNTER — PATIENT MESSAGE (OUTPATIENT)
Dept: PAIN MEDICINE | Facility: CLINIC | Age: 42
End: 2022-05-03
Payer: COMMERCIAL

## 2022-05-04 ENCOUNTER — PATIENT MESSAGE (OUTPATIENT)
Dept: INTERNAL MEDICINE | Facility: CLINIC | Age: 42
End: 2022-05-04
Payer: COMMERCIAL

## 2022-05-04 DIAGNOSIS — G89.29 CHRONIC PAIN OF LEFT KNEE: Primary | ICD-10-CM

## 2022-05-04 DIAGNOSIS — M25.562 CHRONIC PAIN OF LEFT KNEE: Primary | ICD-10-CM

## 2022-05-05 ENCOUNTER — TELEPHONE (OUTPATIENT)
Dept: GASTROENTEROLOGY | Facility: CLINIC | Age: 42
End: 2022-05-05
Payer: COMMERCIAL

## 2022-05-05 ENCOUNTER — OFFICE VISIT (OUTPATIENT)
Dept: URGENT CARE | Facility: CLINIC | Age: 42
End: 2022-05-05
Payer: COMMERCIAL

## 2022-05-05 VITALS
HEART RATE: 74 BPM | TEMPERATURE: 97 F | BODY MASS INDEX: 39.68 KG/M2 | WEIGHT: 293 LBS | HEIGHT: 72 IN | SYSTOLIC BLOOD PRESSURE: 119 MMHG | OXYGEN SATURATION: 96 % | DIASTOLIC BLOOD PRESSURE: 60 MMHG | RESPIRATION RATE: 16 BRPM

## 2022-05-05 DIAGNOSIS — F45.8 BRUXISM: ICD-10-CM

## 2022-05-05 DIAGNOSIS — E66.01 SEVERE OBESITY (BMI >= 40): ICD-10-CM

## 2022-05-05 DIAGNOSIS — H92.02 ACUTE OTALGIA, LEFT: ICD-10-CM

## 2022-05-05 PROCEDURE — 3074F SYST BP LT 130 MM HG: CPT | Mod: CPTII,S$GLB,, | Performed by: NURSE PRACTITIONER

## 2022-05-05 PROCEDURE — 3074F PR MOST RECENT SYSTOLIC BLOOD PRESSURE < 130 MM HG: ICD-10-PCS | Mod: CPTII,S$GLB,, | Performed by: NURSE PRACTITIONER

## 2022-05-05 PROCEDURE — 3008F BODY MASS INDEX DOCD: CPT | Mod: CPTII,S$GLB,, | Performed by: NURSE PRACTITIONER

## 2022-05-05 PROCEDURE — 1160F RVW MEDS BY RX/DR IN RCRD: CPT | Mod: CPTII,S$GLB,, | Performed by: NURSE PRACTITIONER

## 2022-05-05 PROCEDURE — 3078F DIAST BP <80 MM HG: CPT | Mod: CPTII,S$GLB,, | Performed by: NURSE PRACTITIONER

## 2022-05-05 PROCEDURE — 96372 PR INJECTION,THERAP/PROPH/DIAG2ST, IM OR SUBCUT: ICD-10-PCS | Mod: S$GLB,,, | Performed by: NURSE PRACTITIONER

## 2022-05-05 PROCEDURE — 99214 PR OFFICE/OUTPT VISIT, EST, LEVL IV, 30-39 MIN: ICD-10-PCS | Mod: 25,S$GLB,, | Performed by: NURSE PRACTITIONER

## 2022-05-05 PROCEDURE — 1159F PR MEDICATION LIST DOCUMENTED IN MEDICAL RECORD: ICD-10-PCS | Mod: CPTII,S$GLB,, | Performed by: NURSE PRACTITIONER

## 2022-05-05 PROCEDURE — 96372 THER/PROPH/DIAG INJ SC/IM: CPT | Mod: S$GLB,,, | Performed by: NURSE PRACTITIONER

## 2022-05-05 PROCEDURE — 3008F PR BODY MASS INDEX (BMI) DOCUMENTED: ICD-10-PCS | Mod: CPTII,S$GLB,, | Performed by: NURSE PRACTITIONER

## 2022-05-05 PROCEDURE — 99214 OFFICE O/P EST MOD 30 MIN: CPT | Mod: 25,S$GLB,, | Performed by: NURSE PRACTITIONER

## 2022-05-05 PROCEDURE — 3078F PR MOST RECENT DIASTOLIC BLOOD PRESSURE < 80 MM HG: ICD-10-PCS | Mod: CPTII,S$GLB,, | Performed by: NURSE PRACTITIONER

## 2022-05-05 PROCEDURE — 1159F MED LIST DOCD IN RCRD: CPT | Mod: CPTII,S$GLB,, | Performed by: NURSE PRACTITIONER

## 2022-05-05 PROCEDURE — 1160F PR REVIEW ALL MEDS BY PRESCRIBER/CLIN PHARMACIST DOCUMENTED: ICD-10-PCS | Mod: CPTII,S$GLB,, | Performed by: NURSE PRACTITIONER

## 2022-05-05 RX ORDER — KETOROLAC TROMETHAMINE 30 MG/ML
30 INJECTION, SOLUTION INTRAMUSCULAR; INTRAVENOUS
Status: COMPLETED | OUTPATIENT
Start: 2022-05-05 | End: 2022-05-05

## 2022-05-05 RX ADMIN — KETOROLAC TROMETHAMINE 30 MG: 30 INJECTION, SOLUTION INTRAMUSCULAR; INTRAVENOUS at 06:05

## 2022-05-05 NOTE — PATIENT INSTRUCTIONS
PLAN:   Administer Toradol 30 mg im now  Advise use of   Advise increase p.o. fluids--at least 64 ounces of water/juice & rest  Advise use of  Zanaflex  Tylenol or Ibuprofen for fever, headache and body aches.  Advise follow up with PCP in 2- 3 days for recheck  Advise go to ER if nausea, vomiting, fever, increased pain, or fail to improve with treatment.  AVS provided and reviewed with patient including supportive care, follow up, and red flag symptoms.   Patient verbalizes understanding and agrees with treatment plan. Discharged from Urgent Care in stable condition.

## 2022-05-05 NOTE — TELEPHONE ENCOUNTER
Got an incoming fax from [facility] in [place], Dr Helms.   It is recommended to contact the pt to schedule for a Gastric emptying study.   Called [facility] at ph: [phone] and spoke to his nurse. She states that is an order for the pt to have a GE study done. [facility] [place] does not do this test.     She will fax a better copy of the actual order. The pt is then to follow up with Dr Helms with their clinic for results and recommendations.    Advised her the order will be sent to the hospital [facility] dept and pt will be advised to contact them to get the appt scheduled.

## 2022-05-05 NOTE — PROGRESS NOTES
Subjective:       Patient ID: Brook Burdick is a 41 y.o. female.    Vitals:  vitals were not taken for this visit.     Chief Complaint: Otalgia    Pt presents today with left ear pain for appx 3 days. Pt states that she is experiencing a sharp pain in her ear that radiates down her neck. Pt states that she has taken OTC medication for her symptoms.     Otalgia   There is pain in the left ear. This is a new problem. The current episode started in the past 7 days. The problem occurs constantly. The problem has been gradually worsening. There has been no fever. The pain is at a severity of 10/10. Associated symptoms include neck pain. Pertinent negatives include no abdominal pain, coughing, diarrhea, ear discharge, headaches, hearing loss, rash, rhinorrhea, sore throat or vomiting. She has tried acetaminophen (ear drops ) for the symptoms. The treatment provided no relief.       HENT: Positive for ear pain. Negative for ear discharge, hearing loss and sore throat.    Neck: Positive for neck pain.   Respiratory: Negative for cough.    Gastrointestinal: Negative for abdominal pain, vomiting and diarrhea.   Skin: Negative for rash.   Neurological: Negative for headaches.         Past Medical History:   Diagnosis Date    Arthritis of knee 2020    Encounter for blood transfusion     IBS (irritable bowel syndrome)     Liver disease     NALFD    Obesity     Pancreatitis           Social History     Socioeconomic History    Marital status: Single   Tobacco Use    Smoking status: Former Smoker     Quit date: 2022     Years since quittin.0    Smokeless tobacco: Never Used   Substance and Sexual Activity    Alcohol use: No    Drug use: No    Sexual activity: Never     Social Determinants of Health     Financial Resource Strain: Low Risk     Difficulty of Paying Living Expenses: Not hard at all   Food Insecurity: No Food Insecurity    Worried About Running Out of Food in the Last Year: Never true     Ran Out of Food in the Last Year: Never true   Transportation Needs: No Transportation Needs    Lack of Transportation (Medical): No    Lack of Transportation (Non-Medical): No   Physical Activity: Insufficiently Active    Days of Exercise per Week: 2 days    Minutes of Exercise per Session: 20 min   Stress: No Stress Concern Present    Feeling of Stress : Only a little   Social Connections: Unknown    Frequency of Communication with Friends and Family: More than three times a week    Frequency of Social Gatherings with Friends and Family: Never    Active Member of Clubs or Organizations: Yes    Attends Club or Organization Meetings: 1 to 4 times per year    Marital Status:    Housing Stability: Low Risk     Unable to Pay for Housing in the Last Year: No    Number of Places Lived in the Last Year: 1    Unstable Housing in the Last Year: No          Family History   Problem Relation Age of Onset    Atrial fibrillation Mother     Diabetes Father     Hypertension Father     Cancer Maternal Grandfather        ROS:  GENERAL: No fever, chills, fatigability or weight loss.  SKIN: No rashes, itching or changes in color or texture of skin.  HEENT:  Headaches, left earache, no recent trauma Visual acuity fine. No photophobia, ocular pain or diplopia.  No loss of smell, no epistaxis or postnasal drip. No hoarseness or change in voice.   NODES: No masses or lesions. Denies swollen glands.  CHEST: Denies cyanosis, wheezing, cough and sputum production.  CARDIOVASCULAR: Denies chest pain, PND, orthopnea or reduced exercise tolerance.  ABDOMEN: Appetite fine. No weight loss. Denies diarrhea, abdominal pain  MUSCULOSKELETAL: No joint stiffness or swelling. Denies back pain.  NEUROLOGIC: No history of seizures, paralysis, alteration of gait or coordination.  PSYCHIATRIC: Denies mood swings, depression or suicidal thoughts.    PE:   APPEARANCE: Well nourished, well developed, in mild distress.  Temp 97.4°,  pulse ox 97%  V/S: Reviewed.  SKIN: Normal skin turgor, no lesions.  HEENT:  turbinates pink, buccal mucosa with minimal teeth marks, tenderness on palpation of TMJ region, pink pharynx, TM's poor light reflex bilateral, no facial tenderness.  CHEST: Lungs clear to auscultation.  CARDIOVASCULAR: Regular rate and rhythm   MUSCULOSKELETAL: Motor: 5/5 strength major flexors/extensors.  NEUROLOGIC: No sensory deficits. Gait & Posture: Normal gait and fine motion. No cerebellar signs.  MENTAL STATUS: Patient alert, oriented x 3 & conversant.    PLAN:   Administer Toradol 30 mg im now  Advise use of   Advise increase p.o. fluids--at least 64 ounces of water/juice & rest  Advise use of  Zanaflex  Tylenol or Ibuprofen for fever, headache and body aches.  Advise follow up with PCP in 2- 3 days for recheck  Advise go to ER if nausea, vomiting, fever, increased pain, or fail to improve with treatment.  AVS provided and reviewed with patient including supportive care, follow up, and red flag symptoms.   Patient verbalizes understanding and agrees with treatment plan. Discharged from Urgent Care in stable condition.    Advise avoid chewing gum    DIAGNOSIS:   Bruxism  Left otalgia  Obesity

## 2022-05-06 ENCOUNTER — OFFICE VISIT (OUTPATIENT)
Dept: INTERNAL MEDICINE | Facility: CLINIC | Age: 42
End: 2022-05-06
Payer: COMMERCIAL

## 2022-05-06 DIAGNOSIS — F41.9 ANXIETY: ICD-10-CM

## 2022-05-06 DIAGNOSIS — E66.01 SEVERE OBESITY (BMI >= 40): Primary | ICD-10-CM

## 2022-05-06 DIAGNOSIS — R73.03 PREDIABETES: ICD-10-CM

## 2022-05-06 DIAGNOSIS — M79.89 RIGHT LEG SWELLING: ICD-10-CM

## 2022-05-06 DIAGNOSIS — R79.89 ELEVATED LIVER FUNCTION TESTS: ICD-10-CM

## 2022-05-06 DIAGNOSIS — J06.9 URTI (ACUTE UPPER RESPIRATORY INFECTION): ICD-10-CM

## 2022-05-06 PROCEDURE — 99214 PR OFFICE/OUTPT VISIT, EST, LEVL IV, 30-39 MIN: ICD-10-PCS | Mod: 95,,, | Performed by: FAMILY MEDICINE

## 2022-05-06 PROCEDURE — 99214 OFFICE O/P EST MOD 30 MIN: CPT | Mod: 95,,, | Performed by: FAMILY MEDICINE

## 2022-05-06 NOTE — PROGRESS NOTES
Subjective:       Patient ID: Brook Burdick is a 41 y.o. female.    Chief Complaint: No chief complaint on file.    HPI     The patient location is: work  The chief complaint leading to consultation is: multiple issues    Visit type: audiovisual    Face to Face time with patient: 20   minutes of total time spent on the encounter, which includes face to face time and non-face to face time preparing to see the patient (eg, review of tests), Obtaining and/or reviewing separately obtained history, Documenting clinical information in the electronic or other health record, Independently interpreting results (not separately reported) and communicating results to the patient/family/caregiver, or Care coordination (not separately reported).         Each patient to whom he or she provides medical services by telemedicine is:  (1) informed of the relationship between the physician and patient and the respective role of any other health care provider with respect to management of the patient; and (2) notified that he or she may decline to receive medical services by telemedicine and may withdraw from such care at any time.    Notes:       Patient Active Problem List   Diagnosis    Chronic constipation    Family history of colonic polyps    Insulin resistance    Gastroesophageal reflux disease without esophagitis    Severe obesity (BMI >= 40)    Chronic headaches    Cough    Arthritis of knee    Neck pain    Right arm pain    Decreased ROM of intervertebral discs of cervical spine    Right arm weakness    Anxiety and depression       Past Medical History:   Diagnosis Date    Arthritis of knee 9/30/2020    Encounter for blood transfusion     IBS (irritable bowel syndrome)     Liver disease     NALFD    Obesity     Pancreatitis        Past Surgical History:   Procedure Laterality Date    APPENDECTOMY      BILE DUCT EXPLORATION      CHOLECYSTECTOMY      INJECTION OF JOINT Bilateral 9/30/2020    Procedure:  Bilateral intraarticular knee injection with local;  Surgeon: Ozzy Sofia MD;  Location: [facility];  Service: Pain Management;  Laterality: Bilateral;       Family History   Problem Relation Age of Onset    Atrial fibrillation Mother     Diabetes Father     Hypertension Father     Cancer Maternal Grandfather        Social History     Tobacco Use   Smoking Status Former Smoker    Quit date: 2022    Years since quittin.0   Smokeless Tobacco Never Used       Wt Readings from Last 5 Encounters:   22 (!) 163.7 kg (361 lb)   22 (!) 163.7 kg (361 lb)   22 (!) 163.7 kg (361 lb)   22 (!) 164.1 kg (361 lb 12.4 oz)   22 (!) 160 kg (352 lb 11.8 oz)         ozempic  She lost 12 lbs    For further HPI details, see assessment and plan.    Review of Systems   Constitutional: Negative for chills and fever.   HENT: Positive for ear pain, postnasal drip and sore throat. Negative for rhinorrhea.    Respiratory: Positive for cough. Negative for shortness of breath and wheezing.    Cardiovascular: Positive for chest pain.   Musculoskeletal: Positive for myalgias.   Skin: Negative for rash.   Allergic/Immunologic: Positive for environmental allergies.   Neurological: Positive for headaches.       Objective:      There were no vitals filed for this visit.    Physical Exam    Assessment:       1. Severe obesity (BMI >= 40)    2. Prediabetes    3. Elevated liver function tests    4. Right leg swelling        Plan:   Severe obesity (BMI >= 40)    Prediabetes  -     Hemoglobin A1C; Future; Expected date: 2022    Elevated liver function tests  -     Comprehensive Metabolic Panel; Future; Expected date: 2022    Right leg swelling  -     US Lower Extremity Veins Right; Future; Expected date: 2022          ozempic  Losing weight  No side effects  toelrating mediation  Lab Results   Component Value Date    LABA1C 5.5 2018    HGBA1C 5.9 (H) 2021       Checking  a1c    Elevated lft  Lab Results   Component Value Date    ALT 97 (H) 10/29/2021    AST 98 (H) 10/29/2021    ALKPHOS 73 10/29/2021    BILITOT 1.1 (H) 10/29/2021     Check a cmp as well      Obesity  i'm very pleased with her weight loss.  ozempic  Using in conjunction w/ diet and exercise.  Knees have been problematic - limited her exercise.  Saw pain mngt  Sent to ortho 2 days ago.      R. Leg  Calf feels heavy  Feels like weights in ankles  Feels pulsating pains  DVT rule out    URTI  2 weeks  Currently on azithromycin  Improving  Cough improving  Has promethazine.    TMJ   Using muscle relaxant    Anxiety improved  On lexapro  Inquired about this given TMJ

## 2022-05-08 ENCOUNTER — TELEPHONE (OUTPATIENT)
Dept: URGENT CARE | Facility: CLINIC | Age: 42
End: 2022-05-08
Payer: COMMERCIAL

## 2022-05-08 NOTE — TELEPHONE ENCOUNTER
Courtesy call to check and see how they doing , Pt is feeling better. Also said if they have any question or concerns give us lucero back or come back in

## 2022-05-09 ENCOUNTER — PATIENT MESSAGE (OUTPATIENT)
Dept: INTERNAL MEDICINE | Facility: CLINIC | Age: 42
End: 2022-05-09
Payer: COMMERCIAL

## 2022-05-09 ENCOUNTER — TELEPHONE (OUTPATIENT)
Dept: INTERNAL MEDICINE | Facility: CLINIC | Age: 42
End: 2022-05-09
Payer: COMMERCIAL

## 2022-05-09 NOTE — TELEPHONE ENCOUNTER
----- Message from Rosaline Jef sent at 5/9/2022 11:03 AM CDT -----  Contact: Patient  Patient called to consult with nurse or staff regarding a question she has for the office. She states she is supposed to do an ultrasound and labs and was supposed to be contacted but hasn't heard from anyone. She would like to know the status of this and would like a call back. Patient can be reached at [phone]. Thanks/MR

## 2022-05-10 ENCOUNTER — PATIENT OUTREACH (OUTPATIENT)
Dept: ADMINISTRATIVE | Facility: OTHER | Age: 42
End: 2022-05-10
Payer: COMMERCIAL

## 2022-05-10 ENCOUNTER — PATIENT MESSAGE (OUTPATIENT)
Dept: ORTHOPEDICS | Facility: CLINIC | Age: 42
End: 2022-05-10
Payer: COMMERCIAL

## 2022-05-11 ENCOUNTER — LAB VISIT (OUTPATIENT)
Dept: LAB | Facility: HOSPITAL | Age: 42
End: 2022-05-11
Attending: ORTHOPAEDIC SURGERY
Payer: COMMERCIAL

## 2022-05-11 ENCOUNTER — OFFICE VISIT (OUTPATIENT)
Dept: PHYSICAL MEDICINE AND REHAB | Facility: CLINIC | Age: 42
End: 2022-05-11
Payer: COMMERCIAL

## 2022-05-11 ENCOUNTER — OFFICE VISIT (OUTPATIENT)
Dept: ORTHOPEDICS | Facility: CLINIC | Age: 42
End: 2022-05-11
Payer: COMMERCIAL

## 2022-05-11 VITALS
BODY MASS INDEX: 39.68 KG/M2 | WEIGHT: 293 LBS | RESPIRATION RATE: 13 BRPM | SYSTOLIC BLOOD PRESSURE: 130 MMHG | HEIGHT: 72 IN | DIASTOLIC BLOOD PRESSURE: 85 MMHG | HEART RATE: 71 BPM

## 2022-05-11 VITALS — HEIGHT: 72 IN | BODY MASS INDEX: 39.68 KG/M2 | WEIGHT: 293 LBS

## 2022-05-11 DIAGNOSIS — M25.569 KNEE PAIN, UNSPECIFIED CHRONICITY, UNSPECIFIED LATERALITY: Primary | ICD-10-CM

## 2022-05-11 DIAGNOSIS — G56.03 CARPAL TUNNEL SYNDROME ON BOTH SIDES: Primary | ICD-10-CM

## 2022-05-11 DIAGNOSIS — R73.03 PREDIABETES: ICD-10-CM

## 2022-05-11 DIAGNOSIS — G56.03 BILATERAL CARPAL TUNNEL SYNDROME: ICD-10-CM

## 2022-05-11 DIAGNOSIS — Z01.818 PREOP TESTING: ICD-10-CM

## 2022-05-11 DIAGNOSIS — Z01.818 PREOP TESTING: Primary | ICD-10-CM

## 2022-05-11 LAB
ALBUMIN SERPL BCP-MCNC: 3.5 G/DL (ref 3.5–5.2)
ALP SERPL-CCNC: 63 U/L (ref 55–135)
ALT SERPL W/O P-5'-P-CCNC: 41 U/L (ref 10–44)
ANION GAP SERPL CALC-SCNC: 9 MMOL/L (ref 8–16)
AST SERPL-CCNC: 34 U/L (ref 10–40)
BASOPHILS # BLD AUTO: 0.06 K/UL (ref 0–0.2)
BASOPHILS NFR BLD: 1.1 % (ref 0–1.9)
BILIRUB SERPL-MCNC: 0.6 MG/DL (ref 0.1–1)
BUN SERPL-MCNC: 11 MG/DL (ref 6–20)
CALCIUM SERPL-MCNC: 9.5 MG/DL (ref 8.7–10.5)
CHLORIDE SERPL-SCNC: 107 MMOL/L (ref 95–110)
CO2 SERPL-SCNC: 26 MMOL/L (ref 23–29)
CREAT SERPL-MCNC: 0.6 MG/DL (ref 0.5–1.4)
DIFFERENTIAL METHOD: NORMAL
EOSINOPHIL # BLD AUTO: 0.2 K/UL (ref 0–0.5)
EOSINOPHIL NFR BLD: 3.9 % (ref 0–8)
ERYTHROCYTE [DISTWIDTH] IN BLOOD BY AUTOMATED COUNT: 12.9 % (ref 11.5–14.5)
EST. GFR  (AFRICAN AMERICAN): >60 ML/MIN/1.73 M^2
EST. GFR  (NON AFRICAN AMERICAN): >60 ML/MIN/1.73 M^2
ESTIMATED AVG GLUCOSE: 120 MG/DL (ref 68–131)
GLUCOSE SERPL-MCNC: 97 MG/DL (ref 70–110)
HBA1C MFR BLD: 5.8 % (ref 4–5.6)
HCT VFR BLD AUTO: 40 % (ref 37–48.5)
HGB BLD-MCNC: 13 G/DL (ref 12–16)
IMM GRANULOCYTES # BLD AUTO: 0.01 K/UL (ref 0–0.04)
IMM GRANULOCYTES NFR BLD AUTO: 0.2 % (ref 0–0.5)
LYMPHOCYTES # BLD AUTO: 2.1 K/UL (ref 1–4.8)
LYMPHOCYTES NFR BLD: 36.7 % (ref 18–48)
MCH RBC QN AUTO: 29 PG (ref 27–31)
MCHC RBC AUTO-ENTMCNC: 32.5 G/DL (ref 32–36)
MCV RBC AUTO: 89 FL (ref 82–98)
MONOCYTES # BLD AUTO: 0.6 K/UL (ref 0.3–1)
MONOCYTES NFR BLD: 9.8 % (ref 4–15)
NEUTROPHILS # BLD AUTO: 2.7 K/UL (ref 1.8–7.7)
NEUTROPHILS NFR BLD: 48.3 % (ref 38–73)
NRBC BLD-RTO: 0 /100 WBC
PLATELET # BLD AUTO: 297 K/UL (ref 150–450)
PMV BLD AUTO: 10.6 FL (ref 9.2–12.9)
POTASSIUM SERPL-SCNC: 4 MMOL/L (ref 3.5–5.1)
PROT SERPL-MCNC: 7 G/DL (ref 6–8.4)
RBC # BLD AUTO: 4.48 M/UL (ref 4–5.4)
SODIUM SERPL-SCNC: 142 MMOL/L (ref 136–145)
WBC # BLD AUTO: 5.64 K/UL (ref 3.9–12.7)

## 2022-05-11 PROCEDURE — 83036 HEMOGLOBIN GLYCOSYLATED A1C: CPT | Performed by: FAMILY MEDICINE

## 2022-05-11 PROCEDURE — 80053 COMPREHEN METABOLIC PANEL: CPT | Performed by: ORTHOPAEDIC SURGERY

## 2022-05-11 PROCEDURE — 1159F MED LIST DOCD IN RCRD: CPT | Mod: CPTII,S$GLB,, | Performed by: ORTHOPAEDIC SURGERY

## 2022-05-11 PROCEDURE — 95911 PR NERVE CONDUCTION STUDY; 9-10 STUDIES: ICD-10-PCS | Mod: S$GLB,,, | Performed by: PHYSICAL MEDICINE & REHABILITATION

## 2022-05-11 PROCEDURE — 3079F DIAST BP 80-89 MM HG: CPT | Mod: CPTII,S$GLB,, | Performed by: PHYSICAL MEDICINE & REHABILITATION

## 2022-05-11 PROCEDURE — 99203 OFFICE O/P NEW LOW 30 MIN: CPT | Mod: 25,S$GLB,, | Performed by: PHYSICAL MEDICINE & REHABILITATION

## 2022-05-11 PROCEDURE — 99999 PR PBB SHADOW E&M-EST. PATIENT-LVL III: CPT | Mod: PBBFAC,,, | Performed by: ORTHOPAEDIC SURGERY

## 2022-05-11 PROCEDURE — 3079F PR MOST RECENT DIASTOLIC BLOOD PRESSURE 80-89 MM HG: ICD-10-PCS | Mod: CPTII,S$GLB,, | Performed by: PHYSICAL MEDICINE & REHABILITATION

## 2022-05-11 PROCEDURE — 3008F PR BODY MASS INDEX (BMI) DOCUMENTED: ICD-10-PCS | Mod: CPTII,S$GLB,, | Performed by: ORTHOPAEDIC SURGERY

## 2022-05-11 PROCEDURE — 99203 PR OFFICE/OUTPT VISIT, NEW, LEVL III, 30-44 MIN: ICD-10-PCS | Mod: 25,S$GLB,, | Performed by: PHYSICAL MEDICINE & REHABILITATION

## 2022-05-11 PROCEDURE — 99999 PR PBB SHADOW E&M-EST. PATIENT-LVL III: ICD-10-PCS | Mod: PBBFAC,,, | Performed by: ORTHOPAEDIC SURGERY

## 2022-05-11 PROCEDURE — 3008F PR BODY MASS INDEX (BMI) DOCUMENTED: ICD-10-PCS | Mod: CPTII,S$GLB,, | Performed by: PHYSICAL MEDICINE & REHABILITATION

## 2022-05-11 PROCEDURE — 1160F RVW MEDS BY RX/DR IN RCRD: CPT | Mod: CPTII,S$GLB,, | Performed by: PHYSICAL MEDICINE & REHABILITATION

## 2022-05-11 PROCEDURE — 1160F PR REVIEW ALL MEDS BY PRESCRIBER/CLIN PHARMACIST DOCUMENTED: ICD-10-PCS | Mod: CPTII,S$GLB,, | Performed by: PHYSICAL MEDICINE & REHABILITATION

## 2022-05-11 PROCEDURE — 1159F MED LIST DOCD IN RCRD: CPT | Mod: CPTII,S$GLB,, | Performed by: PHYSICAL MEDICINE & REHABILITATION

## 2022-05-11 PROCEDURE — 99999 PR PBB SHADOW E&M-EST. PATIENT-LVL III: CPT | Mod: PBBFAC,,, | Performed by: PHYSICAL MEDICINE & REHABILITATION

## 2022-05-11 PROCEDURE — 99213 OFFICE O/P EST LOW 20 MIN: CPT | Mod: S$GLB,,, | Performed by: ORTHOPAEDIC SURGERY

## 2022-05-11 PROCEDURE — 95911 NRV CNDJ TEST 9-10 STUDIES: CPT | Mod: S$GLB,,, | Performed by: PHYSICAL MEDICINE & REHABILITATION

## 2022-05-11 PROCEDURE — 1159F PR MEDICATION LIST DOCUMENTED IN MEDICAL RECORD: ICD-10-PCS | Mod: CPTII,S$GLB,, | Performed by: PHYSICAL MEDICINE & REHABILITATION

## 2022-05-11 PROCEDURE — 1159F PR MEDICATION LIST DOCUMENTED IN MEDICAL RECORD: ICD-10-PCS | Mod: CPTII,S$GLB,, | Performed by: ORTHOPAEDIC SURGERY

## 2022-05-11 PROCEDURE — 3075F SYST BP GE 130 - 139MM HG: CPT | Mod: CPTII,S$GLB,, | Performed by: PHYSICAL MEDICINE & REHABILITATION

## 2022-05-11 PROCEDURE — 3075F PR MOST RECENT SYSTOLIC BLOOD PRESS GE 130-139MM HG: ICD-10-PCS | Mod: CPTII,S$GLB,, | Performed by: PHYSICAL MEDICINE & REHABILITATION

## 2022-05-11 PROCEDURE — 99213 PR OFFICE/OUTPT VISIT, EST, LEVL III, 20-29 MIN: ICD-10-PCS | Mod: S$GLB,,, | Performed by: ORTHOPAEDIC SURGERY

## 2022-05-11 PROCEDURE — 36415 COLL VENOUS BLD VENIPUNCTURE: CPT | Performed by: ORTHOPAEDIC SURGERY

## 2022-05-11 PROCEDURE — 99999 PR PBB SHADOW E&M-EST. PATIENT-LVL III: ICD-10-PCS | Mod: PBBFAC,,, | Performed by: PHYSICAL MEDICINE & REHABILITATION

## 2022-05-11 PROCEDURE — 85025 COMPLETE CBC W/AUTO DIFF WBC: CPT | Performed by: ORTHOPAEDIC SURGERY

## 2022-05-11 PROCEDURE — 3008F BODY MASS INDEX DOCD: CPT | Mod: CPTII,S$GLB,, | Performed by: ORTHOPAEDIC SURGERY

## 2022-05-11 PROCEDURE — 1160F RVW MEDS BY RX/DR IN RCRD: CPT | Mod: CPTII,S$GLB,, | Performed by: ORTHOPAEDIC SURGERY

## 2022-05-11 PROCEDURE — 3008F BODY MASS INDEX DOCD: CPT | Mod: CPTII,S$GLB,, | Performed by: PHYSICAL MEDICINE & REHABILITATION

## 2022-05-11 PROCEDURE — 1160F PR REVIEW ALL MEDS BY PRESCRIBER/CLIN PHARMACIST DOCUMENTED: ICD-10-PCS | Mod: CPTII,S$GLB,, | Performed by: ORTHOPAEDIC SURGERY

## 2022-05-11 NOTE — PROGRESS NOTES
[facility]  Department of Physiatry  [facility]   [address] [place] [address]           Full Name: Brook Burdick YOB: 1980  Patient ID: [id number]      Visit Date: 5/11/2022 08:10  Age: 41 Years 9 Months Old  Examining Physician: Prabha Clarke M.D.  Referring Physician: Dr Woodward  History: upper ext    Chief Complaint   Patient presents with    Hand Pain       HPI: This is a 41 y.o.  female being seen in clinic today for evaluation of chronic hand pain with numbness/tingling into her first three digits.  Her right hand is more symptomatic than the left.  With increased hand usage and at rest her symptoms are present.  Shaking, rubbing, and position change provide some relief.  She denies loss of  strength.    History obtained from patient    Past family, medical, social, and surgical history reviewed in chart    Review of Systems:     General- denies lethargy, weight change, fever, chills  Head/neck- denies swallowing difficulties  ENT- denies hearing changes  Cardiovascular-denies chest pain  Pulmonary- denies shortness of breath  GI- denies constipation or bowel incontinence  - denies bladder incontinence  Skin- denies wounds or rashes  Musculoskeletal- denies weakness, +pain  Neurologic- +numbness and tingling  Psychiatric- +depression and anxiety  Lymphatic-denies swelling  Endocrine- denies hypoglycemic symptoms/DM history  All other pertinent systems negative     Physical Examination:  General: Well developed, well nourished female, NAD  HEENT:NCAT EOMI bilaterally   Pulmonary:Normal respirations    Spinal Examination: CERVICAL  Active ROM is within normal limits.  Inspection: No deformity of spinal alignment.  Palpation: No vertebral tenderness to percussion.      Musculoskeletal Tests:  Phalen: neg  Elbow compression (ulnar): neg  Tinels at wrist: +on right    Bilateral Upper and Lower Extremities:  Pulses are 2+ at radial  bilaterally.  Shoulder/Elbow/Wrist/Hand ROM wnl  Hip/Knee/Ankle ROM   Bilateral Extremities show normal capillary refill.  No signs of cyanosis, rubor, edema, skin changes, or dysvascular changes of appendages.  Nails appear intact.    Neurological Exam:  Cranial Nerves:  II-XII grossly intact    Manual Muscle Testing: (Motor 5=normal)  5/5 strength bilateral upper extremities    No focal atrophy is noted of either upper extremity.    Bilateral Reflexes:  Lara's response is absent bilaterally.      Sensation: tested to light touch  - intact in arms except dec at right 1st-3rd digits    Gait: Narrow base and good arm swing.      Entire procedure explained to patient prior to proceeding.  Verbal consent obtained      Sensory NCS      Nerve / Sites Rec. Site Onset Lat Peak Lat NP Amp PP Amp Segments Distance Velocity     ms ms µV µV  mm m/s   R Hand Sensories - Median, Ulnar, Radial      Median Digit II Wrist 3.85 4.74 23.4 33.1 Median Digit II - Wrist 140 36      Ulnar Digit V Wrist 2.76 3.65 20.8 25.2 Ulnar Digit V - Wrist 140 51      Radial Forearm Snuff Box 1.88 2.66 14.2 11.5 Radial Forearm - Snuff Box 100 53   L Hand Sensories - Median, Ulnar, Radial      Median Digit II Wrist 3.65 4.53 28.2 47.2 Median Digit II - Wrist 140 38      Ulnar Digit V Wrist 2.45 3.65 13.4 29.8 Ulnar Digit V - Wrist 140 57      Radial Forearm Snuff Box 1.82 2.40 19.2 13.4 Radial Forearm - Snuff Box 100 55       Motor NCS      Nerve / Sites Muscle Latency Amplitude Amp % Duration Segments Distance Lat Diff Velocity     ms mV % ms  mm ms m/s   R Median - APB      Wrist APB 4.58 10.1 100 7.40 Wrist - APB 80        Elbow APB 8.91 10.2 101 7.92 Elbow - Wrist 230 4.32 53   L Median - APB      Wrist APB 3.96 10.2 100 7.50 Wrist - APB 80        Elbow APB 8.75 8.8 85.8 7.66 Elbow - Wrist 240 4.79 50   R Ulnar - ADM      Wrist ADM 3.07 10.5 100 5.57 Wrist - ADM 80        B.Elbow ADM 7.24 10.3 97.6 6.09 B.Elbow - Wrist 250 4.17 60      A.Elbow  ADM 9.43 8.6 81.8 7.24 A.Elbow - B.Elbow 120 2.19 55   L Ulnar - ADM      Wrist ADM 3.18 10.4 100 5.89 Wrist - ADM 80        B.Elbow ADM 7.45 10.7 103 5.99 B.Elbow - Wrist 250 4.27 59      A.Elbow ADM 9.58 10.2 97.9 6.88 A.Elbow - B.Elbow 110 2.14 52                          INTERPRETATION  -Bilateral median motor nerve conduction study showed prolonged latency on the right, normal amplitude, and conduction velocity  -Bilateral median sensory nerve conduction study showed prolonged peak latency and normal amplitude  -Bilateral ulnar motor nerve conduction study showed normal latency, amplitude, and conduction velocity  -Bilateral ulnar sensory nerve conduction study showed normal peak latency and amplitude  -Bilateral radial sensory nerve conduction study showed normal peak latency and amplitude      IMPRESSION  1. ABNORMAL study  2. 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 Follow up with referring provider: Dr. Urban Fuller*  2. Handouts on CTS, injection, release, and exercises provided  3. This study is good for one year. If symptoms worsen or do not improve, please re-consult.    Prabha Clarke M.D.  Physical Medicine and Rehab

## 2022-05-11 NOTE — PROGRESS NOTES
Subjective:     Patient ID: Brook Burdick is a 41 y.o. female.    Chief Complaint: Pain of the Right Wrist and Pain of the Left Wrist      HPI:  The patient is a 41-year-old female with bilateral carpal tunnel syndrome documented by nerve conduction studies right greater than left.  She has tried splinting without improvement.  The numbness is constant rather than intermittent.  She wishes to have a right carpal tunnel release.    Past Medical History:   Diagnosis Date    Arthritis of knee 2020    Encounter for blood transfusion     IBS (irritable bowel syndrome)     Liver disease     NALFD    Obesity     Pancreatitis      Past Surgical History:   Procedure Laterality Date    APPENDECTOMY      BILE DUCT EXPLORATION      CHOLECYSTECTOMY      INJECTION OF JOINT Bilateral 2020    Procedure: Bilateral intraarticular knee injection with local;  Surgeon: Ozzy Sofia MD;  Location: [facility];  Service: Pain Management;  Laterality: Bilateral;     Family History   Problem Relation Age of Onset    Atrial fibrillation Mother     Diabetes Father     Hypertension Father     Cancer Maternal Grandfather      Social History     Socioeconomic History    Marital status: Single   Tobacco Use    Smoking status: Former Smoker     Quit date: 2022     Years since quittin.0    Smokeless tobacco: Never Used   Substance and Sexual Activity    Alcohol use: No    Drug use: No    Sexual activity: Never     Social Determinants of Health     Financial Resource Strain: Low Risk     Difficulty of Paying Living Expenses: Not hard at all   Food Insecurity: No Food Insecurity    Worried About Running Out of Food in the Last Year: Never true    Ran Out of Food in the Last Year: Never true   Transportation Needs: No Transportation Needs    Lack of Transportation (Medical): No    Lack of Transportation (Non-Medical): No   Physical Activity: Insufficiently Active    Days of Exercise per Week: 2  days    Minutes of Exercise per Session: 20 min   Stress: No Stress Concern Present    Feeling of Stress : Only a little   Social Connections: Unknown    Frequency of Communication with Friends and Family: More than three times a week    Frequency of Social Gatherings with Friends and Family: Never    Active Member of Clubs or Organizations: Yes    Attends Club or Organization Meetings: 1 to 4 times per year    Marital Status:    Housing Stability: Low Risk     Unable to Pay for Housing in the Last Year: No    Number of Places Lived in the Last Year: 1    Unstable Housing in the Last Year: No     Medication List with Changes/Refills   Current Medications    ACETAMINOPHEN (TYLENOL) 650 MG TBSR    Take 650 mg by mouth 2 (two) times a day.    AZELASTINE (ASTELIN) 137 MCG (0.1 %) NASAL SPRAY    1 spray (137 mcg total) by Nasal route 2 (two) times daily.    ESCITALOPRAM OXALATE (LEXAPRO) 10 MG TABLET    Take 1 tablet (10 mg total) by mouth once daily.    FLUTICASONE PROPIONATE (FLONASE) 50 MCG/ACTUATION NASAL SPRAY    1 spray (50 mcg total) by Each Nostril route once daily.    LISDEXAMFETAMINE (VYVANSE) 50 MG CAPSULE    Take 1 capsule (50 mg total) by mouth every morning.    MELOXICAM (MOBIC) 7.5 MG TABLET    Take 1 tablet (7.5 mg total) by mouth 2 (two) times daily.    MONTELUKAST (SINGULAIR) 10 MG TABLET    TAKE 1 TABLET(10 MG) BY MOUTH EVERY EVENING    MUPIROCIN (BACTROBAN) 2 % OINTMENT    Apply topically 3 (three) times daily.    NYSTATIN-TRIAMCINOLONE (MYCOLOG II) CREAM        PREGABALIN (LYRICA) 150 MG CAPSULE    Take 1 capsule (150 mg total) by mouth 2 (two) times daily.    SEMAGLUTIDE (OZEMPIC) 0.25 MG OR 0.5 MG(2 MG/1.5 ML) PEN INJECTOR    Inject 0.25 mg into the skin every 7 days.    TIZANIDINE (ZANAFLEX) 4 MG TABLET    Take 1/2 to 1 tab BID PRN muscle spasms. May cause drowsiness.     Review of patient's allergies indicates:   Allergen Reactions    Penicillins Hives     Review of Systems    Constitutional: Negative for malaise/fatigue.   HENT: Negative for hearing loss.    Eyes: Negative for double vision and visual disturbance.   Cardiovascular: Negative for chest pain.   Respiratory: Negative for shortness of breath.    Endocrine: Negative for cold intolerance.   Hematologic/Lymphatic: Does not bruise/bleed easily.   Skin: Negative for poor wound healing and suspicious lesions.   Musculoskeletal: Positive for arthritis, joint pain, joint swelling, muscle weakness and neck pain. Negative for gout.   Gastrointestinal: Positive for heartburn. Negative for nausea and vomiting.   Genitourinary: Negative for dysuria.   Neurological: Positive for headaches, numbness, paresthesias and sensory change.   Psychiatric/Behavioral: Positive for depression. Negative for memory loss and substance abuse. The patient is nervous/anxious.    Allergic/Immunologic: Negative for persistent infections.       Objective:   Body mass index is 48.96 kg/m².  There were no vitals filed for this visit.             General    Constitutional: She is oriented to person, place, and time. She appears well-developed and well-nourished. No distress.   HENT:   Head: Normocephalic.   Mouth/Throat: Oropharynx is clear and moist.   Eyes: EOM are normal.   Cardiovascular: Normal rate.    Pulmonary/Chest: Effort normal.   Abdominal: Soft.   Neurological: She is alert and oriented to person, place, and time. No cranial nerve deficit.   Psychiatric: She has a normal mood and affect.             Right Hand/Wrist Exam     Inspection   Scars: Wrist - absent Hand -  absent  Effusion: Wrist - absent Hand -  absent    Pain   Wrist - The patient exhibits pain of the flexor/pronator group.    Tests   Phalens sign: positive  Tinel's sign (median nerve): positive  Carpal Tunnel Compression Test: positive    Atrophy   Thenar:  negative  Intrinsic:  negative    Other     Neuorologic Exam    Median Distribution: abnormal  Ulnar Distribution:  normal  Radial Distribution: normal    Comments:  The patient has a positive Tinel and positive Phalen sign.  There is no thenar atrophy noted.      Left Hand/Wrist Exam     Inspection   Scars: Wrist - absent Hand -  absent  Effusion: Wrist - absent Hand -  absent    Pain   Wrist - The patient exhibits pain of the flexor/pronator group.    Tests   Phalens sign: positive  Tinel's sign (median nerve): positive  Carpal Tunnel Compression Test: positive    Atrophy  Thenar:  Negative  Intrinsic: negative    Other     Sensory Exam  Median Distribution: abnormal  Ulnar Distribution: normal  Radial Distribution: normal    Comments:  The patient has a positive Tinel and positive Phalen sign.  There is no thenar atrophy noted.          Vascular Exam       Capillary Refill  Right Hand: normal capillary refill  Left Hand: normal capillary refill            radiographs were not obtained today  Assessment:     Encounter Diagnosis   Name Primary?    Carpal tunnel syndrome on both sides Yes        Plan:     The patient wishes to schedule right carpal tunnel release.  She was counseled regarding the surgery.  Risk complications and alternatives were discussed including the risk of infection, anesthetic risk, injury to nerves and vessels, loss of motion, and possible need for additional surgeries were discussed.  She seems to understand and agree that surgery.  All questions were answered.                Disclaimer: This note was prepared using a voice recognition system and is likely to have sound alike errors within the text.

## 2022-05-12 ENCOUNTER — PATIENT MESSAGE (OUTPATIENT)
Dept: INTERNAL MEDICINE | Facility: CLINIC | Age: 42
End: 2022-05-12
Payer: COMMERCIAL

## 2022-05-12 ENCOUNTER — HOSPITAL ENCOUNTER (OUTPATIENT)
Dept: RADIOLOGY | Facility: HOSPITAL | Age: 42
Discharge: HOME | End: 2022-05-12
Attending: FAMILY MEDICINE
Payer: COMMERCIAL

## 2022-05-12 DIAGNOSIS — M79.89 RIGHT LEG SWELLING: ICD-10-CM

## 2022-05-12 PROCEDURE — 93971 EXTREMITY STUDY: CPT | Mod: TC,RT

## 2022-05-23 ENCOUNTER — TELEPHONE (OUTPATIENT)
Dept: ORTHOPEDICS | Facility: CLINIC | Age: 42
End: 2022-05-23
Payer: COMMERCIAL

## 2022-05-23 NOTE — TELEPHONE ENCOUNTER
Per pt r/s surgery to 12/19/2022.      ----- Message from Calos Carvalho sent at 5/23/2022  2:06 PM CDT -----  Contact: [phone]  Patient is calling regarding procedure 6/13. Please call back at [phone] . Thanks/dj

## 2022-05-24 DIAGNOSIS — Z01.818 PREOP TESTING: Primary | ICD-10-CM

## 2022-06-14 ENCOUNTER — PATIENT MESSAGE (OUTPATIENT)
Dept: PAIN MEDICINE | Facility: CLINIC | Age: 42
End: 2022-06-14
Payer: COMMERCIAL

## 2022-06-14 ENCOUNTER — TELEPHONE (OUTPATIENT)
Dept: PAIN MEDICINE | Facility: CLINIC | Age: 42
End: 2022-06-14
Payer: COMMERCIAL

## 2022-06-14 DIAGNOSIS — M54.16 LUMBAR RADICULOPATHY: Primary | ICD-10-CM

## 2022-06-14 NOTE — TELEPHONE ENCOUNTER
Pt wanted an appointment. Informed pt that Dr. Noriega is booked until August but she could see a pa. She stated that was fine and an appt was made with a pa   No

## 2022-06-20 ENCOUNTER — PATIENT MESSAGE (OUTPATIENT)
Dept: PSYCHIATRY | Facility: CLINIC | Age: 42
End: 2022-06-20

## 2022-06-20 ENCOUNTER — OFFICE VISIT (OUTPATIENT)
Dept: PSYCHIATRY | Facility: CLINIC | Age: 42
End: 2022-06-20
Payer: COMMERCIAL

## 2022-06-20 DIAGNOSIS — F90.2 ATTENTION DEFICIT HYPERACTIVITY DISORDER (ADHD), COMBINED TYPE: ICD-10-CM

## 2022-06-20 DIAGNOSIS — F41.1 GENERALIZED ANXIETY DISORDER: ICD-10-CM

## 2022-06-20 DIAGNOSIS — F33.1 MODERATE EPISODE OF RECURRENT MAJOR DEPRESSIVE DISORDER: Primary | ICD-10-CM

## 2022-06-20 PROCEDURE — 1159F MED LIST DOCD IN RCRD: CPT | Mod: CPTII,95,, | Performed by: PSYCHOLOGIST

## 2022-06-20 PROCEDURE — 99215 PR OFFICE/OUTPT VISIT, EST, LEVL V, 40-54 MIN: ICD-10-PCS | Mod: 95,,, | Performed by: PSYCHOLOGIST

## 2022-06-20 PROCEDURE — 3044F PR MOST RECENT HEMOGLOBIN A1C LEVEL <7.0%: ICD-10-PCS | Mod: CPTII,95,, | Performed by: PSYCHOLOGIST

## 2022-06-20 PROCEDURE — 3044F HG A1C LEVEL LT 7.0%: CPT | Mod: CPTII,95,, | Performed by: PSYCHOLOGIST

## 2022-06-20 PROCEDURE — 90836 PSYTX W PT W E/M 45 MIN: CPT | Mod: 95,,, | Performed by: PSYCHOLOGIST

## 2022-06-20 PROCEDURE — 1159F PR MEDICATION LIST DOCUMENTED IN MEDICAL RECORD: ICD-10-PCS | Mod: CPTII,95,, | Performed by: PSYCHOLOGIST

## 2022-06-20 PROCEDURE — 99215 OFFICE O/P EST HI 40 MIN: CPT | Mod: 95,,, | Performed by: PSYCHOLOGIST

## 2022-06-20 PROCEDURE — 90836 PR PSYCHOTHERAPY W/PATIENT W/E&M, 45 MIN (ADD ON): ICD-10-PCS | Mod: 95,,, | Performed by: PSYCHOLOGIST

## 2022-06-20 RX ORDER — LISDEXAMFETAMINE DIMESYLATE 60 MG/1
60 CAPSULE ORAL EVERY MORNING
Qty: 30 CAPSULE | Refills: 0 | Status: SHIPPED | OUTPATIENT
Start: 2022-06-20 | End: 2022-07-20

## 2022-06-20 RX ORDER — LISDEXAMFETAMINE DIMESYLATE 60 MG/1
60 CAPSULE ORAL EVERY MORNING
Qty: 30 CAPSULE | Refills: 0 | Status: SHIPPED | OUTPATIENT
Start: 2022-07-20 | End: 2022-12-27

## 2022-06-20 RX ORDER — ESCITALOPRAM OXALATE 10 MG/1
15 TABLET ORAL DAILY
Qty: 45 TABLET | Refills: 2 | Status: SHIPPED | OUTPATIENT
Start: 2022-06-20 | End: 2022-10-31 | Stop reason: SDUPTHER

## 2022-06-20 RX ORDER — LISDEXAMFETAMINE DIMESYLATE 60 MG/1
60 CAPSULE ORAL EVERY MORNING
Qty: 30 CAPSULE | Refills: 0 | Status: SHIPPED | OUTPATIENT
Start: 2022-08-19 | End: 2022-08-05 | Stop reason: SDUPTHER

## 2022-06-20 NOTE — PATIENT INSTRUCTIONS
"[facility]PARTMENT OF [facility]IAT[facility]   PATIENT INFORMATION    We appreciate the opportunity to participate in your medical care and hope the following protocols will make it easier for you to receive quality treatment in our department.    PUNCTUALITY: Your appointment is scheduled for a fixed amount of time, reserved especially for you.  To get the benefit of your appointment, please arrive at least 15 minutes early to allow time for traffic, parking and registration.  Should you arrive more than 15 minutes late to your appointment, you will be rescheduled in order to assure your clinician has adequate time to assess you and provide helpful care.      APPOINTMENTS: Appointments are made by the nursing/front office staff or through the patient portal. Providers do not have access  to schedule appointments. Walk in appointments are not available. FOR EMERGENCIES, PLEASE GO THE CLOSEST EMERGENCY ROOM.    CANCELLATION/MISSED APPOINTMENTS:   In order to receive quality care, all appointments must be kept.  If you are unable to keep an appointment, please reschedule at least 3 days prior if possible. Late cancellations (within 24 hours of the appointment) and repeated no-show appointments may result in dismissal from the clinic. After two no show/late cancellation visits, you will receive a notice letter, alerting you to keep visits to prevent department dismissal. If another visit is missed after receipt of the notice, you will be discharged from the clinic. This policy is in effect to allow for other individuals on a long waiting list to be seen as soon as possible. Unlike other branches of medicine where several individuals can be scheduled in a 30 minute time slot, only one individual can be scheduled in any time slot in Psychiatry.     MESSAGES: For simple questions/concerns, you may contact your individual providers electronically through the "[facility]" portal or by calling [phone] " with messages relayed via office staff. If relevant, include pharmacy name and phone number, date of last visit and next scheduled visit, phone number where you can be reached throughout the day, and whether leaving a voicemail or message on an answering machine is acceptable. Messages will be returned by the Medical Assistant or Office Staff after your provider has reviewed the message.  Please allow 24 hours for a returned message before leaving another message. Messages will be checked each workday (Monday through Friday) during office hours (8:00 a.m. and 5:00 p.m.) and returned at most within one business day.  You may leave a non-urgent message after hours. Note that psychotherapy and medication management are not appropriate by telephone or the patient portal.    PRESCRIPTION REFILLS:  Please communicate with your prescriber about any refills you need during your appointment. You may also request refills through the [facility] portal (preferred) or by calling the clinic. Prescriptions will be filled during office hours.     Please do not wait until you are completely out of medication to request refills. Same day refills are not always possible. Patients may experience symptoms of withdrawal if they run out of medications. The patient assumes all responsibility when there is an issue with non-compliance with follow-up appointments and medications.  Some medications are controlled and regulated by the FDA and [place]. Some of these medications can not be refilled before 30 days and require a face to face appointment.     PAPERWORK REQUESTS: If you have any forms or letters that need to be completed by your doctor, please present these at the beginning of the appointment to ensure that information needed to complete them is obtained during the office visit. Paperwork will be returned within 7-10 business days. Staff will call you to  the paperwork when completed.    SPECIAL EVALUATIONS: Please note that our  "department is treatment-focused. As such, we focus on treatment-oriented evaluations and do not perform specialty or "forensic" evaluations. Examples are listed below.    Disability: We do not do disability evaluations.  Please contact Social Security Administration for evaluations and determinations. You will then sign releases allowing for records from your treatment providers to be forwarded to Social Security Administration to use in their evaluation.  Gun Permit: We do not offer Sound Judgment Evaluations or assessments leading to gun ownership, nor do we fill out or file paperwork relevant to owning, concealing or purchasing a firearm.  Emotional Support     Animals (ANDREA): We do not provide documentation, including letters, to aid in the acclamation that an Emotional Support Animal is required. Note that ESAs are not trained to perform tasks or recognize particular signs or symptoms. Rather, they are distinguished by the close, emotional, and supportive bond between the animal and the owner.       SAMPLES: We do not provide samples of any medications. If you have financial difficulties and are on a limited income, you may qualify for Patient Assistance Programs from various pharmaceutical companies. This will require that you complete paperwork with your financial information, but this does not guarantee that the company will approve the application. Alternative medication options can be discussed.    REFERRALS/COORDINATION: You will be referred to other providers if we feel unable to adequately diagnose or treat your particular condition, or if collaboration with another provider would allow for better management of your condition.    Pregnancy Warning:  Patient denies current pregnancy.  Patient made aware that medications have not been proven safe in pregnancy and that she must maintain adequate birth control. Patient instructed to alert us immediately if she becomes pregnant.      Call In if " problems  Call Report Side Effects   Encouraged to follow up with primary care / Gen Med MD for continued monitoring of general health and wellness  Call 911 Or go to ER if Acute Concerns (especially if any thoughts of harm to self or other)  Food diary--use [employer] david to track--also track feeling/thoughts for late night snacking  Exercise/water movement/yoga  Increase water intake  Monitor BP          Chasidy Anthony, PhD, MP  Advanced Practice Medical Psychologist  [facility]  [address] [facility].  [place] [id number] [phone]   [phone] fax
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
## 2022-07-08 ENCOUNTER — TELEPHONE (OUTPATIENT)
Dept: PAIN MEDICINE | Facility: CLINIC | Age: 42
End: 2022-07-08
Payer: COMMERCIAL

## 2022-07-08 RX ORDER — MONTELUKAST SODIUM 10 MG/1
TABLET ORAL
Qty: 90 TABLET | Refills: 1 | Status: SHIPPED | OUTPATIENT
Start: 2022-07-08 | End: 2022-08-05

## 2022-07-08 NOTE — TELEPHONE ENCOUNTER
Refill Routing Note   Medication(s) are not appropriate for processing by [facility] for the following reason(s):      - Medication not active on medication list    [facility] action(s):  Defer       Medication Therapy Plan: Previously DCed by another MD for reason: pt not taking  Medication reconciliation completed: No     Appointments  past 12m or future 3m with PCP    Date Provider   Last Visit   5/6/2022 Yoan Henry MD   Next Visit   11/18/2022 Yoan Henry MD   ED visits in past 90 days: 0        Note composed:2:04 PM 07/08/2022

## 2022-07-08 NOTE — TELEPHONE ENCOUNTER
No new care gaps identified.  [facility] Embedded Care Gaps. Reference number: [id number]. 7/08/2022   5:47:59 AM CDT

## 2022-07-08 NOTE — TELEPHONE ENCOUNTER
----- Message from Nancy Gillespie PA-C sent at 7/8/2022 11:31 AM CDT -----  Contact: [phone]  That is the first available appointment with Dr. Noriega. If she would like to see Dr. Jones, we could likely get her in sooner.   ----- Message -----  From: Chel Holcomb  Sent: 7/8/2022  10:51 AM CDT  To: Leann Robison Staff    Type:  Sooner Apoointment Request    Caller is requesting a sooner appointment.  Caller declined first available appointment listed below.  Caller will not accept being placed on the waitlist and is requesting a message be sent to doctor.  Name of Caller:patient  When is the first available appointment?9/1  Symptoms:neck pain  Would the patient rather a call back or a response via [facility]? Call back  Best Call Back Number:[phone]  Additional Information:

## 2022-08-05 ENCOUNTER — OFFICE VISIT (OUTPATIENT)
Dept: PAIN MEDICINE | Facility: CLINIC | Age: 42
End: 2022-08-05
Payer: COMMERCIAL

## 2022-08-05 VITALS
HEART RATE: 75 BPM | SYSTOLIC BLOOD PRESSURE: 147 MMHG | HEIGHT: 72 IN | DIASTOLIC BLOOD PRESSURE: 89 MMHG | BODY MASS INDEX: 39.68 KG/M2 | WEIGHT: 293 LBS | RESPIRATION RATE: 17 BRPM

## 2022-08-05 DIAGNOSIS — M25.562 CHRONIC PAIN OF LEFT KNEE: ICD-10-CM

## 2022-08-05 DIAGNOSIS — M54.12 CERVICAL RADICULOPATHY: Primary | ICD-10-CM

## 2022-08-05 DIAGNOSIS — M25.561 CHRONIC PAIN OF BOTH KNEES: ICD-10-CM

## 2022-08-05 DIAGNOSIS — M47.816 LUMBAR SPONDYLOSIS: ICD-10-CM

## 2022-08-05 DIAGNOSIS — M25.562 CHRONIC PAIN OF BOTH KNEES: ICD-10-CM

## 2022-08-05 DIAGNOSIS — M54.16 LUMBAR RADICULOPATHY: ICD-10-CM

## 2022-08-05 DIAGNOSIS — M47.812 CERVICAL SPONDYLOSIS: ICD-10-CM

## 2022-08-05 DIAGNOSIS — E66.01 MORBID OBESITY WITH BMI OF 40.0-44.9, ADULT: ICD-10-CM

## 2022-08-05 DIAGNOSIS — G89.29 CHRONIC MYOFASCIAL PAIN: ICD-10-CM

## 2022-08-05 DIAGNOSIS — M79.12 MYALGIA OF AUXILIARY MUSCLES, HEAD AND NECK: ICD-10-CM

## 2022-08-05 DIAGNOSIS — G89.29 CHRONIC PAIN OF LEFT KNEE: ICD-10-CM

## 2022-08-05 DIAGNOSIS — M50.30 DDD (DEGENERATIVE DISC DISEASE), CERVICAL: ICD-10-CM

## 2022-08-05 DIAGNOSIS — M79.18 CHRONIC MYOFASCIAL PAIN: ICD-10-CM

## 2022-08-05 DIAGNOSIS — G89.29 CHRONIC PAIN OF BOTH KNEES: ICD-10-CM

## 2022-08-05 PROCEDURE — 99213 PR OFFICE/OUTPT VISIT, EST, LEVL III, 20-29 MIN: ICD-10-PCS | Mod: S$GLB,,, | Performed by: ANESTHESIOLOGY

## 2022-08-05 PROCEDURE — 1159F MED LIST DOCD IN RCRD: CPT | Mod: CPTII,S$GLB,, | Performed by: ANESTHESIOLOGY

## 2022-08-05 PROCEDURE — 3008F PR BODY MASS INDEX (BMI) DOCUMENTED: ICD-10-PCS | Mod: CPTII,S$GLB,, | Performed by: ANESTHESIOLOGY

## 2022-08-05 PROCEDURE — 99999 PR PBB SHADOW E&M-EST. PATIENT-LVL III: CPT | Mod: PBBFAC,,, | Performed by: ANESTHESIOLOGY

## 2022-08-05 PROCEDURE — 3008F BODY MASS INDEX DOCD: CPT | Mod: CPTII,S$GLB,, | Performed by: ANESTHESIOLOGY

## 2022-08-05 PROCEDURE — 1159F PR MEDICATION LIST DOCUMENTED IN MEDICAL RECORD: ICD-10-PCS | Mod: CPTII,S$GLB,, | Performed by: ANESTHESIOLOGY

## 2022-08-05 PROCEDURE — 1160F RVW MEDS BY RX/DR IN RCRD: CPT | Mod: CPTII,S$GLB,, | Performed by: ANESTHESIOLOGY

## 2022-08-05 PROCEDURE — 1160F PR REVIEW ALL MEDS BY PRESCRIBER/CLIN PHARMACIST DOCUMENTED: ICD-10-PCS | Mod: CPTII,S$GLB,, | Performed by: ANESTHESIOLOGY

## 2022-08-05 PROCEDURE — 3044F PR MOST RECENT HEMOGLOBIN A1C LEVEL <7.0%: ICD-10-PCS | Mod: CPTII,S$GLB,, | Performed by: ANESTHESIOLOGY

## 2022-08-05 PROCEDURE — 3079F PR MOST RECENT DIASTOLIC BLOOD PRESSURE 80-89 MM HG: ICD-10-PCS | Mod: CPTII,S$GLB,, | Performed by: ANESTHESIOLOGY

## 2022-08-05 PROCEDURE — 99999 PR PBB SHADOW E&M-EST. PATIENT-LVL III: ICD-10-PCS | Mod: PBBFAC,,, | Performed by: ANESTHESIOLOGY

## 2022-08-05 PROCEDURE — 3044F HG A1C LEVEL LT 7.0%: CPT | Mod: CPTII,S$GLB,, | Performed by: ANESTHESIOLOGY

## 2022-08-05 PROCEDURE — 3079F DIAST BP 80-89 MM HG: CPT | Mod: CPTII,S$GLB,, | Performed by: ANESTHESIOLOGY

## 2022-08-05 PROCEDURE — 3077F SYST BP >= 140 MM HG: CPT | Mod: CPTII,S$GLB,, | Performed by: ANESTHESIOLOGY

## 2022-08-05 PROCEDURE — 99213 OFFICE O/P EST LOW 20 MIN: CPT | Mod: S$GLB,,, | Performed by: ANESTHESIOLOGY

## 2022-08-05 PROCEDURE — 3077F PR MOST RECENT SYSTOLIC BLOOD PRESSURE >= 140 MM HG: ICD-10-PCS | Mod: CPTII,S$GLB,, | Performed by: ANESTHESIOLOGY

## 2022-08-05 RX ORDER — DICLOFENAC SODIUM 10 MG/G
2 GEL TOPICAL 4 TIMES DAILY PRN
Qty: 100 G | Refills: 2 | Status: SHIPPED | OUTPATIENT
Start: 2022-08-05 | End: 2023-09-20

## 2022-08-05 RX ORDER — PREGABALIN 150 MG/1
150 CAPSULE ORAL 2 TIMES DAILY
Qty: 60 CAPSULE | Refills: 2 | Status: SHIPPED | OUTPATIENT
Start: 2022-08-05 | End: 2022-10-06 | Stop reason: SDUPTHER
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
## 2022-08-07 ENCOUNTER — PATIENT MESSAGE (OUTPATIENT)
Dept: PSYCHIATRY | Facility: CLINIC | Age: 42
End: 2022-08-07
Payer: COMMERCIAL

## 2022-09-14 DIAGNOSIS — Z12.31 OTHER SCREENING MAMMOGRAM: ICD-10-CM

## 2022-09-15 ENCOUNTER — PATIENT MESSAGE (OUTPATIENT)
Dept: OTOLARYNGOLOGY | Facility: CLINIC | Age: 42
End: 2022-09-15
Payer: COMMERCIAL

## 2022-10-06 ENCOUNTER — OFFICE VISIT (OUTPATIENT)
Dept: PAIN MEDICINE | Facility: CLINIC | Age: 42
End: 2022-10-06
Payer: COMMERCIAL

## 2022-10-06 VITALS
SYSTOLIC BLOOD PRESSURE: 142 MMHG | DIASTOLIC BLOOD PRESSURE: 81 MMHG | WEIGHT: 293 LBS | HEIGHT: 72 IN | BODY MASS INDEX: 39.68 KG/M2 | HEART RATE: 68 BPM

## 2022-10-06 DIAGNOSIS — G89.29 CHRONIC PAIN OF BOTH KNEES: ICD-10-CM

## 2022-10-06 DIAGNOSIS — E66.01 MORBID OBESITY WITH BMI OF 40.0-44.9, ADULT: ICD-10-CM

## 2022-10-06 DIAGNOSIS — M25.561 CHRONIC PAIN OF BOTH KNEES: ICD-10-CM

## 2022-10-06 DIAGNOSIS — M25.562 CHRONIC PAIN OF BOTH KNEES: ICD-10-CM

## 2022-10-06 DIAGNOSIS — M79.18 CHRONIC MYOFASCIAL PAIN: ICD-10-CM

## 2022-10-06 DIAGNOSIS — M25.562 CHRONIC PAIN OF LEFT KNEE: ICD-10-CM

## 2022-10-06 DIAGNOSIS — M54.16 LUMBAR RADICULOPATHY: ICD-10-CM

## 2022-10-06 DIAGNOSIS — G89.29 CHRONIC MYOFASCIAL PAIN: ICD-10-CM

## 2022-10-06 DIAGNOSIS — M54.12 CERVICAL RADICULOPATHY: ICD-10-CM

## 2022-10-06 DIAGNOSIS — M47.816 LUMBAR SPONDYLOSIS: ICD-10-CM

## 2022-10-06 DIAGNOSIS — G89.29 CHRONIC PAIN OF LEFT KNEE: ICD-10-CM

## 2022-10-06 DIAGNOSIS — M79.12 MYALGIA OF AUXILIARY MUSCLES, HEAD AND NECK: ICD-10-CM

## 2022-10-06 PROCEDURE — 99214 OFFICE O/P EST MOD 30 MIN: CPT | Mod: S$GLB,,, | Performed by: PHYSICAL MEDICINE & REHABILITATION

## 2022-10-06 PROCEDURE — 3079F DIAST BP 80-89 MM HG: CPT | Mod: CPTII,S$GLB,, | Performed by: PHYSICAL MEDICINE & REHABILITATION

## 2022-10-06 PROCEDURE — 3077F SYST BP >= 140 MM HG: CPT | Mod: CPTII,S$GLB,, | Performed by: PHYSICAL MEDICINE & REHABILITATION

## 2022-10-06 PROCEDURE — 3077F PR MOST RECENT SYSTOLIC BLOOD PRESSURE >= 140 MM HG: ICD-10-PCS | Mod: CPTII,S$GLB,, | Performed by: PHYSICAL MEDICINE & REHABILITATION

## 2022-10-06 PROCEDURE — 99214 PR OFFICE/OUTPT VISIT, EST, LEVL IV, 30-39 MIN: ICD-10-PCS | Mod: S$GLB,,, | Performed by: PHYSICAL MEDICINE & REHABILITATION

## 2022-10-06 PROCEDURE — 3044F PR MOST RECENT HEMOGLOBIN A1C LEVEL <7.0%: ICD-10-PCS | Mod: CPTII,S$GLB,, | Performed by: PHYSICAL MEDICINE & REHABILITATION

## 2022-10-06 PROCEDURE — 3008F PR BODY MASS INDEX (BMI) DOCUMENTED: ICD-10-PCS | Mod: CPTII,S$GLB,, | Performed by: PHYSICAL MEDICINE & REHABILITATION

## 2022-10-06 PROCEDURE — 1159F MED LIST DOCD IN RCRD: CPT | Mod: CPTII,S$GLB,, | Performed by: PHYSICAL MEDICINE & REHABILITATION

## 2022-10-06 PROCEDURE — 3044F HG A1C LEVEL LT 7.0%: CPT | Mod: CPTII,S$GLB,, | Performed by: PHYSICAL MEDICINE & REHABILITATION

## 2022-10-06 PROCEDURE — 1160F RVW MEDS BY RX/DR IN RCRD: CPT | Mod: CPTII,S$GLB,, | Performed by: PHYSICAL MEDICINE & REHABILITATION

## 2022-10-06 PROCEDURE — 1159F PR MEDICATION LIST DOCUMENTED IN MEDICAL RECORD: ICD-10-PCS | Mod: CPTII,S$GLB,, | Performed by: PHYSICAL MEDICINE & REHABILITATION

## 2022-10-06 PROCEDURE — 99999 PR PBB SHADOW E&M-EST. PATIENT-LVL III: ICD-10-PCS | Mod: PBBFAC,,, | Performed by: PHYSICAL MEDICINE & REHABILITATION

## 2022-10-06 PROCEDURE — 99999 PR PBB SHADOW E&M-EST. PATIENT-LVL III: CPT | Mod: PBBFAC,,, | Performed by: PHYSICAL MEDICINE & REHABILITATION

## 2022-10-06 PROCEDURE — 3079F PR MOST RECENT DIASTOLIC BLOOD PRESSURE 80-89 MM HG: ICD-10-PCS | Mod: CPTII,S$GLB,, | Performed by: PHYSICAL MEDICINE & REHABILITATION

## 2022-10-06 PROCEDURE — 1160F PR REVIEW ALL MEDS BY PRESCRIBER/CLIN PHARMACIST DOCUMENTED: ICD-10-PCS | Mod: CPTII,S$GLB,, | Performed by: PHYSICAL MEDICINE & REHABILITATION

## 2022-10-06 PROCEDURE — 3008F BODY MASS INDEX DOCD: CPT | Mod: CPTII,S$GLB,, | Performed by: PHYSICAL MEDICINE & REHABILITATION

## 2022-10-06 RX ORDER — ZONISAMIDE 25 MG/1
CAPSULE ORAL
Qty: 100 CAPSULE | Refills: 0 | Status: SHIPPED | OUTPATIENT
Start: 2022-10-06 | End: 2022-12-27

## 2022-10-06 RX ORDER — TIZANIDINE 4 MG/1
TABLET ORAL
Qty: 60 TABLET | Refills: 1 | Status: SHIPPED | OUTPATIENT
Start: 2022-10-06 | End: 2023-02-03 | Stop reason: SDUPTHER

## 2022-10-06 RX ORDER — PREGABALIN 150 MG/1
150 CAPSULE ORAL 2 TIMES DAILY
Qty: 60 CAPSULE | Refills: 2 | Status: SHIPPED | OUTPATIENT
Start: 2022-10-06 | End: 2023-04-05 | Stop reason: SDUPTHER

## 2022-10-06 RX ORDER — IBUPROFEN 800 MG/1
800 TABLET ORAL EVERY 6 HOURS PRN
COMMUNITY
Start: 2022-09-23 | End: 2022-12-27
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
## 2022-10-18 ENCOUNTER — PATIENT MESSAGE (OUTPATIENT)
Dept: PAIN MEDICINE | Facility: CLINIC | Age: 42
End: 2022-10-18
Payer: COMMERCIAL

## 2022-10-28 ENCOUNTER — PATIENT MESSAGE (OUTPATIENT)
Dept: PSYCHIATRY | Facility: CLINIC | Age: 42
End: 2022-10-28
Payer: COMMERCIAL

## 2022-10-31 ENCOUNTER — PATIENT MESSAGE (OUTPATIENT)
Dept: PSYCHIATRY | Facility: CLINIC | Age: 42
End: 2022-10-31
Payer: COMMERCIAL

## 2022-10-31 DIAGNOSIS — F33.1 MODERATE EPISODE OF RECURRENT MAJOR DEPRESSIVE DISORDER: ICD-10-CM

## 2022-10-31 DIAGNOSIS — F41.1 GENERALIZED ANXIETY DISORDER: ICD-10-CM

## 2022-10-31 RX ORDER — ESCITALOPRAM OXALATE 10 MG/1
15 TABLET ORAL DAILY
Qty: 45 TABLET | Refills: 1 | Status: SHIPPED | OUTPATIENT
Start: 2022-10-31 | End: 2022-12-27

## 2022-11-21 ENCOUNTER — TELEPHONE (OUTPATIENT)
Dept: ORTHOPEDICS | Facility: CLINIC | Age: 42
End: 2022-11-21
Payer: COMMERCIAL

## 2022-11-21 NOTE — TELEPHONE ENCOUNTER
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

## 2022-11-21 NOTE — TELEPHONE ENCOUNTER
----- Message from Gloria Veronica MA sent at 11/21/2022  8:47 AM CST -----  Regarding: FW: Surgery    ----- Message -----  From: Alicia Estevez RN  Sent: 11/21/2022   7:39 AM CST  To: [facility] Staff  Subject: Surgery                                          Please Advise;    Just spoke to patient due to canceling her PAT appointment, patient would like to cancel surgery. I told patient to reach out to Dr. Woodward office. Thanks!

## 2022-12-14 ENCOUNTER — OFFICE VISIT (OUTPATIENT)
Dept: URGENT CARE | Facility: CLINIC | Age: 42
End: 2022-12-14
Payer: COMMERCIAL

## 2022-12-14 VITALS
BODY MASS INDEX: 39.68 KG/M2 | WEIGHT: 293 LBS | OXYGEN SATURATION: 96 % | SYSTOLIC BLOOD PRESSURE: 134 MMHG | DIASTOLIC BLOOD PRESSURE: 77 MMHG | TEMPERATURE: 97 F | HEIGHT: 72 IN | HEART RATE: 71 BPM | RESPIRATION RATE: 16 BRPM

## 2022-12-14 DIAGNOSIS — R51.9 NONINTRACTABLE HEADACHE, UNSPECIFIED CHRONICITY PATTERN, UNSPECIFIED HEADACHE TYPE: Primary | ICD-10-CM

## 2022-12-14 PROCEDURE — 3008F PR BODY MASS INDEX (BMI) DOCUMENTED: ICD-10-PCS | Mod: CPTII,S$GLB,, | Performed by: NURSE PRACTITIONER

## 2022-12-14 PROCEDURE — 96372 PR INJECTION,THERAP/PROPH/DIAG2ST, IM OR SUBCUT: ICD-10-PCS | Mod: S$GLB,,, | Performed by: NURSE PRACTITIONER

## 2022-12-14 PROCEDURE — 3078F PR MOST RECENT DIASTOLIC BLOOD PRESSURE < 80 MM HG: ICD-10-PCS | Mod: CPTII,S$GLB,, | Performed by: NURSE PRACTITIONER

## 2022-12-14 PROCEDURE — 3044F HG A1C LEVEL LT 7.0%: CPT | Mod: CPTII,S$GLB,, | Performed by: NURSE PRACTITIONER

## 2022-12-14 PROCEDURE — 3044F PR MOST RECENT HEMOGLOBIN A1C LEVEL <7.0%: ICD-10-PCS | Mod: CPTII,S$GLB,, | Performed by: NURSE PRACTITIONER

## 2022-12-14 PROCEDURE — 3075F PR MOST RECENT SYSTOLIC BLOOD PRESS GE 130-139MM HG: ICD-10-PCS | Mod: CPTII,S$GLB,, | Performed by: NURSE PRACTITIONER

## 2022-12-14 PROCEDURE — 1159F PR MEDICATION LIST DOCUMENTED IN MEDICAL RECORD: ICD-10-PCS | Mod: CPTII,S$GLB,, | Performed by: NURSE PRACTITIONER

## 2022-12-14 PROCEDURE — 3078F DIAST BP <80 MM HG: CPT | Mod: CPTII,S$GLB,, | Performed by: NURSE PRACTITIONER

## 2022-12-14 PROCEDURE — 1159F MED LIST DOCD IN RCRD: CPT | Mod: CPTII,S$GLB,, | Performed by: NURSE PRACTITIONER

## 2022-12-14 PROCEDURE — 99213 PR OFFICE/OUTPT VISIT, EST, LEVL III, 20-29 MIN: ICD-10-PCS | Mod: 25,S$GLB,, | Performed by: NURSE PRACTITIONER

## 2022-12-14 PROCEDURE — 3008F BODY MASS INDEX DOCD: CPT | Mod: CPTII,S$GLB,, | Performed by: NURSE PRACTITIONER

## 2022-12-14 PROCEDURE — 99213 OFFICE O/P EST LOW 20 MIN: CPT | Mod: 25,S$GLB,, | Performed by: NURSE PRACTITIONER

## 2022-12-14 PROCEDURE — 96372 THER/PROPH/DIAG INJ SC/IM: CPT | Mod: S$GLB,,, | Performed by: NURSE PRACTITIONER

## 2022-12-14 PROCEDURE — 3075F SYST BP GE 130 - 139MM HG: CPT | Mod: CPTII,S$GLB,, | Performed by: NURSE PRACTITIONER

## 2022-12-14 RX ORDER — KETOROLAC TROMETHAMINE 30 MG/ML
30 INJECTION, SOLUTION INTRAMUSCULAR; INTRAVENOUS
Status: COMPLETED | OUTPATIENT
Start: 2022-12-14 | End: 2022-12-14

## 2022-12-14 RX ORDER — BUTALBITAL, ACETAMINOPHEN AND CAFFEINE 50; 325; 40 MG/1; MG/1; MG/1
1 TABLET ORAL
Qty: 12 TABLET | Refills: 0 | Status: SHIPPED | OUTPATIENT
Start: 2022-12-14 | End: 2022-12-27

## 2022-12-14 RX ADMIN — KETOROLAC TROMETHAMINE 30 MG: 30 INJECTION, SOLUTION INTRAMUSCULAR; INTRAVENOUS at 03:12

## 2022-12-14 NOTE — PROGRESS NOTES
Subjective:       Patient ID: Brook Burdick is a 42 y.o. female.    Vitals:  height is 6' (1.829 m) and weight is 154.7 kg (341 lb) (abnormal). Her tympanic temperature is 96.8 °F (36 °C). Her blood pressure is 134/77 and her pulse is 71. Her respiration is 16 and oxygen saturation is 96%.     Chief Complaint: Headache (Continuous headache since last Friday. - Entered by patient)    42 yr old female presents to the Urgent Care with complaint of continuous headache x 5 days. Patient has taken Tylenol PM and Excedrin with no relief noted. Mild relief with Benadryl.     Headache   This is a new problem. The current episode started in the past 7 days (5). The problem occurs constantly. The problem has been unchanged. The pain is located in the Temporal region. The pain does not radiate. The quality of the pain is described as aching and throbbing. The pain is at a severity of 7/10. The pain is moderate. Associated symptoms include photophobia. Pertinent negatives include no abdominal pain, abnormal behavior, anorexia, back pain, blurred vision, coughing, drainage, ear pain, eye pain, eye redness, eye watering, facial sweating, fever, hearing loss, insomnia, loss of balance, muscle aches, nausea, neck pain, numbness, phonophobia, rhinorrhea, scalp tenderness, seizures, sinus pressure, sore throat, swollen glands, tingling, tinnitus, visual change, vomiting, weakness or weight loss. The symptoms are aggravated by bright light and noise. She has tried acetaminophen and Excedrin for the symptoms. The treatment provided no relief.     Constitution: Negative for chills, sweating, fatigue and fever.   HENT:  Negative for ear pain, tinnitus, hearing loss, sinus pressure and sore throat.    Neck: Negative for neck pain.   Cardiovascular:  Negative for chest pain and sob on exertion.   Eyes:  Positive for photophobia. Negative for eye pain, eye redness and blurred vision.   Respiratory:  Negative for cough, sputum production  and shortness of breath.    Gastrointestinal:  Negative for abdominal pain, nausea and vomiting.   Musculoskeletal:  Negative for back pain.   Neurological:  Positive for headaches. Negative for history of vertigo, loss of balance, altered mental status, numbness and seizures.   Psychiatric/Behavioral:  Negative for altered mental status. The patient does not have insomnia.      Objective:      Physical Exam   Constitutional: She is oriented to person, place, and time. She appears well-developed. She is cooperative.  Non-toxic appearance. She does not appear ill. No distress.   HENT:   Head: Normocephalic and atraumatic.   Ears:   Right Ear: Hearing, tympanic membrane, external ear and ear canal normal.   Left Ear: Hearing, tympanic membrane, external ear and ear canal normal.   Nose: Nose normal. No mucosal edema, rhinorrhea or nasal deformity. No epistaxis. Right sinus exhibits no maxillary sinus tenderness and no frontal sinus tenderness. Left sinus exhibits no maxillary sinus tenderness and no frontal sinus tenderness.   Mouth/Throat: Uvula is midline, oropharynx is clear and moist and mucous membranes are normal. No trismus in the jaw. Normal dentition. No uvula swelling. No posterior oropharyngeal erythema.   Eyes: Conjunctivae, EOM and lids are normal. Pupils are equal, round, and reactive to light. No scleral icterus.   Neck: Neck supple. No neck rigidity present.   Cardiovascular: Normal rate and normal heart sounds.   Pulmonary/Chest: Effort normal and breath sounds normal. No respiratory distress.   Abdominal: Normal appearance.   Musculoskeletal: Normal range of motion.         General: No deformity. Normal range of motion.   Neurological: no focal deficit. She is alert, oriented to person, place, and time and at baseline. She has normal motor skills and normal sensation. No cranial nerve deficit. She exhibits normal muscle tone. Gait and coordination normal. Coordination normal.   Skin: Skin is warm,  dry, intact, not diaphoretic and not pale. Capillary refill takes less than 2 seconds.   Psychiatric: Her speech is normal and behavior is normal. Judgment and thought content normal.   Nursing note and vitals reviewed.      Assessment:       1. Nonintractable headache, unspecified chronicity pattern, unspecified headache type          Plan:         Nonintractable headache, unspecified chronicity pattern, unspecified headache type  -     ketorolac injection 30 mg  -     butalbital-acetaminophen-caffeine -40 mg (FIORICET, ESGIC) -40 mg per tablet; Take 1 tablet by mouth every 4 to 6 hours as needed for Pain or Headaches.  Dispense: 12 tablet; Refill: 0      Patient Instructions   If you were prescribed a narcotic or controlled medication, do not drive or operate heavy equipment or machinery while taking these medications.  You must understand that you've received an Urgent Care treatment only and that you may be released before all your medical problems are known or treated. You, the patient, will arrange for follow up care as instructed.  Follow up with your PCP or specialty clinic as directed within 2-5 days if not improved or as needed.  You can call [phone] to schedule an appointment with the appropriate provider.  If your condition worsens we recommend that you receive another evaluation at the emergency room immediately or contact your primary medical clinics after hours call service to discuss your concerns.  Please return here or go to the Emergency Department for any concerns or worsening of condition.

## 2022-12-14 NOTE — PATIENT INSTRUCTIONS

## 2022-12-21 ENCOUNTER — TELEPHONE (OUTPATIENT)
Dept: PAIN MEDICINE | Facility: CLINIC | Age: 42
End: 2022-12-21
Payer: COMMERCIAL

## 2022-12-24 ENCOUNTER — PATIENT MESSAGE (OUTPATIENT)
Dept: INTERNAL MEDICINE | Facility: CLINIC | Age: 42
End: 2022-12-24
Payer: COMMERCIAL

## 2022-12-27 ENCOUNTER — OFFICE VISIT (OUTPATIENT)
Dept: INTERNAL MEDICINE | Facility: CLINIC | Age: 42
End: 2022-12-27
Payer: COMMERCIAL

## 2022-12-27 ENCOUNTER — PATIENT MESSAGE (OUTPATIENT)
Dept: INTERNAL MEDICINE | Facility: CLINIC | Age: 42
End: 2022-12-27

## 2022-12-27 DIAGNOSIS — E66.01 MORBID OBESITY: ICD-10-CM

## 2022-12-27 DIAGNOSIS — R73.03 PREDIABETES: ICD-10-CM

## 2022-12-27 DIAGNOSIS — F41.9 ANXIETY: Primary | ICD-10-CM

## 2022-12-27 DIAGNOSIS — F32.A DEPRESSION, UNSPECIFIED DEPRESSION TYPE: ICD-10-CM

## 2022-12-27 PROCEDURE — 99213 PR OFFICE/OUTPT VISIT, EST, LEVL III, 20-29 MIN: ICD-10-PCS | Mod: 95,,, | Performed by: FAMILY MEDICINE

## 2022-12-27 PROCEDURE — 99213 OFFICE O/P EST LOW 20 MIN: CPT | Mod: 95,,, | Performed by: FAMILY MEDICINE

## 2022-12-27 PROCEDURE — 3044F HG A1C LEVEL LT 7.0%: CPT | Mod: CPTII,95,, | Performed by: FAMILY MEDICINE

## 2022-12-27 PROCEDURE — 3044F PR MOST RECENT HEMOGLOBIN A1C LEVEL <7.0%: ICD-10-PCS | Mod: CPTII,95,, | Performed by: FAMILY MEDICINE

## 2022-12-27 RX ORDER — TRAZODONE HYDROCHLORIDE 50 MG/1
100 TABLET ORAL NIGHTLY
Qty: 180 TABLET | Refills: 1 | Status: SHIPPED | OUTPATIENT
Start: 2022-12-27 | End: 2023-01-09 | Stop reason: ALTCHOICE

## 2022-12-27 NOTE — PROGRESS NOTES
Subjective:       Patient ID: Brook Burdick is a 42 y.o. female.    Chief Complaint: No chief complaint on file.    HPI    The patient location is: home  The chief complaint leading to consultation is: anxiety, depression, insomnia    Visit type: audiovisual    Face to Face time with patient: 30   minutes of total time spent on the encounter, which includes face to face time and non-face to face time preparing to see the patient (eg, review of tests), Obtaining and/or reviewing separately obtained history, Documenting clinical information in the electronic or other health record, Independently interpreting results (not separately reported) and communicating results to the patient/family/caregiver, or Care coordination (not separately reported).         Each patient to whom he or she provides medical services by telemedicine is:  (1) informed of the relationship between the physician and patient and the respective role of any other health care provider with respect to management of the patient; and (2) notified that he or she may decline to receive medical services by telemedicine and may withdraw from such care at any time.    Notes:       42    Patient Active Problem List   Diagnosis    Chronic constipation    Family history of colonic polyps    Insulin resistance    Gastroesophageal reflux disease without esophagitis    Severe obesity (BMI >= 40)    Chronic headaches    Cough    Arthritis of knee    Neck pain    Right arm pain    Decreased ROM of intervertebral discs of cervical spine    Right arm weakness    Anxiety and depression    Bilateral carpal tunnel syndrome       Past Medical History:   Diagnosis Date    Arthritis of knee 9/30/2020    Encounter for blood transfusion     IBS (irritable bowel syndrome)     Liver disease     NALFD    Obesity     Pancreatitis        Past Surgical History:   Procedure Laterality Date    APPENDECTOMY      BILE DUCT EXPLORATION      CHOLECYSTECTOMY      INJECTION OF JOINT  Bilateral 2020    Procedure: Bilateral intraarticular knee injection with local;  Surgeon: Ozzy Sofia MD;  Location: [facility];  Service: Pain Management;  Laterality: Bilateral;       Family History   Problem Relation Age of Onset    Atrial fibrillation Mother     Diabetes Father     Hypertension Father     Cancer Maternal Grandfather        Social History     Tobacco Use   Smoking Status Former    Types: Cigarettes    Quit date: 2022    Years since quittin.7   Smokeless Tobacco Never       Wt Readings from Last 5 Encounters:   22 (!) 154.7 kg (341 lb)   10/06/22 (!) 151.2 kg (333 lb 5.4 oz)   22 (!) 156.1 kg (344 lb 4 oz)   22 (!) 163.7 kg (361 lb)   22 (!) 163.7 kg (361 lb)       For further HPI details, see assessment and plan.    Review of Systems   Constitutional:  Negative for activity change and unexpected weight change.   HENT:  Negative for hearing loss, rhinorrhea and trouble swallowing.    Eyes:  Positive for visual disturbance. Negative for discharge.   Respiratory:  Positive for chest tightness. Negative for wheezing.    Cardiovascular:  Positive for chest pain and palpitations.   Gastrointestinal:  Positive for constipation and diarrhea. Negative for blood in stool and vomiting.   Endocrine: Negative for polydipsia and polyuria.   Genitourinary:  Positive for menstrual problem. Negative for difficulty urinating, dysuria and hematuria.   Musculoskeletal:  Positive for arthralgias and neck pain. Negative for joint swelling.   Neurological:  Positive for weakness and headaches.   Psychiatric/Behavioral:  Positive for dysphoric mood. Negative for confusion.      Objective:      There were no vitals filed for this visit.    Physical Exam  Constitutional:       General: She is not in acute distress.     Appearance: Normal appearance. She is well-developed.   Cardiovascular:      Rate and Rhythm: Normal rate and regular rhythm.   Pulmonary:      Effort:  Pulmonary effort is normal. No respiratory distress.      Breath sounds: Normal breath sounds.   Musculoskeletal:         General: Normal range of motion.   Neurological:      Mental Status: She is alert. She is not disoriented.   Psychiatric:         Mood and Affect: Mood normal.         Speech: Speech normal.       Assessment:       1. Anxiety    2. Morbid obesity    3. Prediabetes    4. Depression, unspecified depression type        Plan:   Anxiety    Morbid obesity    Prediabetes    Depression, unspecified depression type    Other orders  -     traZODone (DESYREL) 50 MG tablet; Take 2 tablets (100 mg total) by mouth every evening.  Dispense: 180 tablet; Refill: 1        Patient presents today to discuss anxiety, depression, insomnia.      She has been under the care of psychiatry treat department seeing a psychologist.  She has had problems with that relationship and does not seem to be working well with her provider.  At present she has no desire to follow-up with anyone in that department.  She was being treated with Lexapro which she ran out of and did not seek refills.  She did go through a withdrawal issue for 2 weeks.  She was having nausea and vomiting.  She was also on Vyvanse.  I did discuss I do not prescribe stimulant therapy in an order for her to restart her ADHD specific stimulants she would need to follow up with the Psychiatry Department.  She is okay placing a hold on that.      Things have been rough for her since October.  She is no longer working, has no health insurance, has significant financial strain, has gone through a break-up, has stress of school.  Additionally she is having a very hard time sleeping and is only getting 1-2 hours at night and finds that she is waking and unable to fall back asleep.      She denies any suicidal thoughts or intention.  She denies any risk of her hurting herself or anyone else.    Today in an effort to address these issues I am going to start her on an  alternative medication from her previous drugs.  She has tried Zoloft, Lexapro, and Wellbutrin.  She has had no significant benefit with any of these medications.  We will start her on trazodone at 50 mg in the evening hours with an attention to increase the dose to 100 mg after 1-2 weeks.  The goal of this medication is to get her rest and to improve her mood.      In addition to the pharmaceutical intervention I am also prescribing exercise.  I discussed the seriousness of this prescription.  She seems skeptical and I am not certain if she will follow through with increased physical activity we did discuss the evidence of benefit for underlying anxiety and depression with exercise.  This is in addition to her overall physical health as well as she is morbidly obese with prediabetes.      I would like to check her A1c but she wants to place a hold on any testing given financial concerns.  She has been on Ozempic but again cost his problematic and she is not been using it regularly.  She is on only a low dose and taking it every 2-4 weeks.  I doubt any clinical benefit with such rare use at such a low dose and I do not think it is worth it.  We will leave on the drug list for now and if she is able to get insurance back we can resume and adjust and titrate the medications dosing.    I would like to see her in about 2 months.  I did discuss I am going to be out on paternity leave for the next 6 weeks but if she is having any problems to still reach out for assistance via my colleagues.    Patient agreed and understood the plan and was able to verbalize a summation of our plan.      This note was verbally dictated, please excuse any type errors.

## 2023-01-09 ENCOUNTER — OFFICE VISIT (OUTPATIENT)
Dept: PSYCHIATRY | Facility: CLINIC | Age: 43
End: 2023-01-09
Payer: MEDICAID

## 2023-01-09 DIAGNOSIS — F41.1 GENERALIZED ANXIETY DISORDER: ICD-10-CM

## 2023-01-09 DIAGNOSIS — F33.1 MODERATE EPISODE OF RECURRENT MAJOR DEPRESSIVE DISORDER: Primary | ICD-10-CM

## 2023-01-09 DIAGNOSIS — F99 INSOMNIA DUE TO OTHER MENTAL DISORDER: ICD-10-CM

## 2023-01-09 DIAGNOSIS — F90.2 ATTENTION DEFICIT HYPERACTIVITY DISORDER (ADHD), COMBINED TYPE: ICD-10-CM

## 2023-01-09 DIAGNOSIS — F51.05 INSOMNIA DUE TO OTHER MENTAL DISORDER: ICD-10-CM

## 2023-01-09 PROCEDURE — 1159F PR MEDICATION LIST DOCUMENTED IN MEDICAL RECORD: ICD-10-PCS | Mod: HP,HB,CPTII,95 | Performed by: PSYCHOLOGIST

## 2023-01-09 PROCEDURE — 1159F MED LIST DOCD IN RCRD: CPT | Mod: HP,HB,CPTII,95 | Performed by: PSYCHOLOGIST

## 2023-01-09 PROCEDURE — 90833 PSYTX W PT W E/M 30 MIN: CPT | Mod: HP,HB,95, | Performed by: PSYCHOLOGIST

## 2023-01-09 PROCEDURE — 90833 PR PSYCHOTHERAPY W/PATIENT W/E&M, 30 MIN (ADD ON): ICD-10-PCS | Mod: HP,HB,95, | Performed by: PSYCHOLOGIST

## 2023-01-09 PROCEDURE — 99214 OFFICE O/P EST MOD 30 MIN: CPT | Mod: HP,HB,95, | Performed by: PSYCHOLOGIST

## 2023-01-09 PROCEDURE — 99214 PR OFFICE/OUTPT VISIT, EST, LEVL IV, 30-39 MIN: ICD-10-PCS | Mod: HP,HB,95, | Performed by: PSYCHOLOGIST

## 2023-01-09 RX ORDER — DOXEPIN HYDROCHLORIDE 10 MG/1
10 CAPSULE ORAL NIGHTLY
Qty: 30 CAPSULE | Refills: 2 | Status: SHIPPED | OUTPATIENT
Start: 2023-01-09 | End: 2023-09-20

## 2023-01-09 RX ORDER — LISDEXAMFETAMINE DIMESYLATE 60 MG/1
60 CAPSULE ORAL EVERY MORNING
Qty: 30 CAPSULE | Refills: 0 | Status: SHIPPED | OUTPATIENT
Start: 2023-02-08 | End: 2023-03-06

## 2023-01-09 RX ORDER — LISDEXAMFETAMINE DIMESYLATE 60 MG/1
60 CAPSULE ORAL EVERY MORNING
Qty: 30 CAPSULE | Refills: 0 | Status: SHIPPED | OUTPATIENT
Start: 2023-01-09 | End: 2023-02-08

## 2023-01-09 RX ORDER — ESCITALOPRAM OXALATE 10 MG/1
15 TABLET ORAL DAILY
Qty: 45 TABLET | Refills: 5 | Status: SHIPPED | OUTPATIENT
Start: 2023-01-30 | End: 2023-05-05 | Stop reason: SDUPTHER

## 2023-01-09 RX ORDER — ESCITALOPRAM OXALATE 10 MG/1
TABLET ORAL
Qty: 18 TABLET | Refills: 0 | Status: SHIPPED | OUTPATIENT
Start: 2023-01-09 | End: 2023-01-31

## 2023-01-09 RX ORDER — LISDEXAMFETAMINE DIMESYLATE 60 MG/1
60 CAPSULE ORAL EVERY MORNING
Qty: 30 CAPSULE | Refills: 0 | Status: SHIPPED | OUTPATIENT
Start: 2023-03-10 | End: 2023-03-06

## 2023-01-09 NOTE — PATIENT INSTRUCTIONS
"[facility] DEPARTMENT OF PSYCHIATRY   PATIENT INFORMATION    We appreciate the opportunity to participate in your medical care and hope the following protocols will make it easier for you to receive quality treatment in our department.    PUNCTUALITY: Your appointment is scheduled for a fixed amount of time, reserved especially for you.  To get the benefit of your appointment, please arrive at least 15 minutes early to allow time for traffic, parking and registration.  Should you arrive more than 15 minutes late to your appointment, you will be rescheduled in order to assure your clinician has adequate time to assess you and provide helpful care.      APPOINTMENTS: Appointments are made by the nursing/front office staff or through the patient portal. Providers do not have access  to schedule appointments. Walk in appointments are not available. FOR EMERGENCIES, PLEASE GO THE CLOSEST EMERGENCY ROOM.    CANCELLATION/MISSED APPOINTMENTS:   In order to receive quality care, all appointments must be kept.  If you are unable to keep an appointment, please reschedule at least 3 days prior if possible. Late cancellations (within 24 hours of the appointment) and repeated no-show appointments may result in dismissal from the clinic. After two no show/late cancellation visits, you will receive a notice letter, alerting you to keep visits to prevent department dismissal. If another visit is missed after receipt of the notice, you will be discharged from the clinic. This policy is in effect to allow for other individuals on a long waiting list to be seen as soon as possible. Unlike other branches of medicine where several individuals can be scheduled in a 30 minute time slot, only one individual can be scheduled in any time slot in Psychiatry.     MESSAGES: For simple questions/concerns, you may contact your individual providers electronically through the "[facility]" portal or by calling [phone] " with messages relayed via office staff. If relevant, include pharmacy name and phone number, date of last visit and next scheduled visit, phone number where you can be reached throughout the day, and whether leaving a voicemail or message on an answering machine is acceptable. Messages will be returned by the Medical Assistant or Office Staff after your provider has reviewed the message.  Please allow 24 hours for a returned message before leaving another message. Messages will be checked each workday (Monday through Friday) during office hours (8:00 a.m. and 5:00 p.m.) and returned at most within one business day.  You may leave a non-urgent message after hours. Note that psychotherapy and medication management are not appropriate by telephone or the patient portal.    PRESCRIPTION REFILLS:  Please communicate with your prescriber about any refills you need during your appointment. You may also request refills through the [facility] portal (preferred) or by calling the clinic. Prescriptions will be filled during office hours.     Please do not wait until you are completely out of medication to request refills. Same day refills are not always possible. Patients may experience symptoms of withdrawal if they run out of medications. The patient assumes all responsibility when there is an issue with non-compliance with follow-up appointments and medications.  Some medications are controlled and regulated by the FDA and [place]. Some of these medications can not be refilled before 30 days and require a face to face appointment.     PAPERWORK REQUESTS: If you have any forms or letters that need to be completed by your doctor, please present these at the beginning of the appointment to ensure that information needed to complete them is obtained during the office visit. Paperwork will be returned within 7-10 business days. Staff will call you to  the paperwork when completed.    SPECIAL EVALUATIONS: Please note that our  "department is treatment-focused. As such, we focus on treatment-oriented evaluations and do not perform specialty or "forensic" evaluations. Examples are listed below.    Disability: We do not do disability evaluations.  Please contact Social Security Administration for evaluations and determinations. You will then sign releases allowing for records from your treatment providers to be forwarded to Social Security Administration to use in their evaluation.  Gun Permit: We do not offer Sound Judgment Evaluations or assessments leading to gun ownership, nor do we fill out or file paperwork relevant to owning, concealing or purchasing a firearm.  Emotional Support     Animals (ANDREA): We do not provide documentation, including letters, to aid in the acclamation that an Emotional Support Animal is required. Note that ESAs are not trained to perform tasks or recognize particular signs or symptoms. Rather, they are distinguished by the close, emotional, and supportive bond between the animal and the owner.       SAMPLES: We do not provide samples of any medications. If you have financial difficulties and are on a limited income, you may qualify for Patient Assistance Programs from various pharmaceutical companies. This will require that you complete paperwork with your financial information, but this does not guarantee that the company will approve the application. Alternative medication options can be discussed.    REFERRALS/COORDINATION: You will be referred to other providers if we feel unable to adequately diagnose or treat your particular condition, or if collaboration with another provider would allow for better management of your condition.       Call In if problems  Call Report Side Effects   Encouraged to follow up with primary care / Gen Med MD for continued monitoring of general health and wellness  Call 911 Or go to ER if Acute Concerns (especially if any thoughts of harm to self or other)          Chasidy Anthony, " PhD, MP  Advanced Practice Medical Psychologist  [facility]  [address]  [place] [id number] [phone]   [phone] fax
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
## 2023-01-18 ENCOUNTER — PATIENT MESSAGE (OUTPATIENT)
Dept: OPHTHALMOLOGY | Facility: CLINIC | Age: 43
End: 2023-01-18

## 2023-01-18 ENCOUNTER — OFFICE VISIT (OUTPATIENT)
Dept: OPHTHALMOLOGY | Facility: CLINIC | Age: 43
End: 2023-01-18
Payer: MEDICAID

## 2023-01-18 DIAGNOSIS — H00.15 CHALAZION LEFT LOWER EYELID: Primary | ICD-10-CM

## 2023-01-18 DIAGNOSIS — H52.7 REFRACTIVE ERRORS: ICD-10-CM

## 2023-01-18 PROCEDURE — 99999 PR PBB SHADOW E&M-EST. PATIENT-LVL III: CPT | Mod: PBBFAC,,, | Performed by: OPTOMETRIST

## 2023-01-18 PROCEDURE — 92012 INTRM OPH EXAM EST PATIENT: CPT | Mod: S$PBB,,, | Performed by: OPTOMETRIST

## 2023-01-18 PROCEDURE — 92012 PR EYE EXAM, EST PATIENT,INTERMED: ICD-10-PCS | Mod: S$PBB,,, | Performed by: OPTOMETRIST

## 2023-01-18 PROCEDURE — 92015 PR REFRACTION: ICD-10-PCS | Mod: ,,, | Performed by: OPTOMETRIST

## 2023-01-18 PROCEDURE — 1159F MED LIST DOCD IN RCRD: CPT | Mod: CPTII,,, | Performed by: OPTOMETRIST

## 2023-01-18 PROCEDURE — 99213 OFFICE O/P EST LOW 20 MIN: CPT | Mod: PBBFAC | Performed by: OPTOMETRIST

## 2023-01-18 PROCEDURE — 92015 DETERMINE REFRACTIVE STATE: CPT | Mod: ,,, | Performed by: OPTOMETRIST

## 2023-01-18 PROCEDURE — 99999 PR PBB SHADOW E&M-EST. PATIENT-LVL III: ICD-10-PCS | Mod: PBBFAC,,, | Performed by: OPTOMETRIST

## 2023-01-18 PROCEDURE — 1159F PR MEDICATION LIST DOCUMENTED IN MEDICAL RECORD: ICD-10-PCS | Mod: CPTII,,, | Performed by: OPTOMETRIST

## 2023-01-18 RX ORDER — ERYTHROMYCIN 5 MG/G
OINTMENT OPHTHALMIC 2 TIMES DAILY
Qty: 1 EACH | Refills: 3 | Status: SHIPPED | OUTPATIENT
Start: 2023-01-18 | End: 2023-09-22

## 2023-01-18 NOTE — PROGRESS NOTES
HPI    Stye on LLL x 1 day.  Left eye lid puffy, hurting  Pain scale 2-3  Last eye visit 08/20/2020 [facility].  Last eye exam 3-4 years ago.  Last edited by Madelin العلي MA on 1/18/2023  2:57 PM.            Assessment /Plan     For exam results, see Encounter Report.    Chalazion left lower eyelid  -     erythromycin (ROMYCIN) ophthalmic ointment; Place into the left eye 2 (two) times a day.  Dispense: 1 each; Refill: 3    Refractive errors      Worksheet given. Warm compresses followed by lid scrubs, tid.    Dispense Final Rx for glasses.  RTC 1 year DFE  Discussed above and answered questions.

## 2023-01-25 ENCOUNTER — PATIENT MESSAGE (OUTPATIENT)
Dept: ADMINISTRATIVE | Facility: HOSPITAL | Age: 43
End: 2023-01-25
Payer: COMMERCIAL

## 2023-02-16 ENCOUNTER — OFFICE VISIT (OUTPATIENT)
Dept: INTERNAL MEDICINE | Facility: CLINIC | Age: 43
End: 2023-02-16
Payer: COMMERCIAL

## 2023-02-16 ENCOUNTER — HOSPITAL ENCOUNTER (OUTPATIENT)
Dept: RADIOLOGY | Facility: HOSPITAL | Age: 43
Discharge: HOME | End: 2023-02-16
Attending: FAMILY MEDICINE
Payer: COMMERCIAL

## 2023-02-16 VITALS
DIASTOLIC BLOOD PRESSURE: 84 MMHG | OXYGEN SATURATION: 96 % | TEMPERATURE: 99 F | SYSTOLIC BLOOD PRESSURE: 122 MMHG | BODY MASS INDEX: 39.68 KG/M2 | HEIGHT: 72 IN | HEART RATE: 97 BPM | WEIGHT: 293 LBS

## 2023-02-16 DIAGNOSIS — M25.561 ACUTE PAIN OF RIGHT KNEE: Primary | ICD-10-CM

## 2023-02-16 DIAGNOSIS — M25.561 ACUTE PAIN OF RIGHT KNEE: ICD-10-CM

## 2023-02-16 DIAGNOSIS — M54.9 BACK PAIN, UNSPECIFIED BACK LOCATION, UNSPECIFIED BACK PAIN LATERALITY, UNSPECIFIED CHRONICITY: ICD-10-CM

## 2023-02-16 DIAGNOSIS — G47.9 TROUBLE IN SLEEPING: ICD-10-CM

## 2023-02-16 DIAGNOSIS — E66.01 MORBID OBESITY: ICD-10-CM

## 2023-02-16 DIAGNOSIS — F41.9 ANXIETY: ICD-10-CM

## 2023-02-16 PROCEDURE — 99999 PR PBB SHADOW E&M-EST. PATIENT-LVL V: ICD-10-PCS | Mod: PBBFAC,,, | Performed by: FAMILY MEDICINE

## 2023-02-16 PROCEDURE — 1159F PR MEDICATION LIST DOCUMENTED IN MEDICAL RECORD: ICD-10-PCS | Mod: CPTII,S$GLB,, | Performed by: FAMILY MEDICINE

## 2023-02-16 PROCEDURE — 99214 OFFICE O/P EST MOD 30 MIN: CPT | Mod: S$GLB,,, | Performed by: FAMILY MEDICINE

## 2023-02-16 PROCEDURE — 73560 X-RAY EXAM OF KNEE 1 OR 2: CPT | Mod: 26,RT,, | Performed by: STUDENT IN AN ORGANIZED HEALTH CARE EDUCATION/TRAINING PROGRAM

## 2023-02-16 PROCEDURE — 99214 PR OFFICE/OUTPT VISIT, EST, LEVL IV, 30-39 MIN: ICD-10-PCS | Mod: S$GLB,,, | Performed by: FAMILY MEDICINE

## 2023-02-16 PROCEDURE — 73560 XR KNEE ORTHO LEFT: ICD-10-PCS | Mod: 26,RT,, | Performed by: STUDENT IN AN ORGANIZED HEALTH CARE EDUCATION/TRAINING PROGRAM

## 2023-02-16 PROCEDURE — 73560 X-RAY EXAM OF KNEE 1 OR 2: CPT | Mod: TC,59,RT

## 2023-02-16 PROCEDURE — 73562 X-RAY EXAM OF KNEE 3: CPT | Mod: 26,LT,, | Performed by: STUDENT IN AN ORGANIZED HEALTH CARE EDUCATION/TRAINING PROGRAM

## 2023-02-16 PROCEDURE — 73562 XR KNEE ORTHO LEFT: ICD-10-PCS | Mod: 26,LT,, | Performed by: STUDENT IN AN ORGANIZED HEALTH CARE EDUCATION/TRAINING PROGRAM

## 2023-02-16 PROCEDURE — 3074F SYST BP LT 130 MM HG: CPT | Mod: CPTII,S$GLB,, | Performed by: FAMILY MEDICINE

## 2023-02-16 PROCEDURE — 3079F DIAST BP 80-89 MM HG: CPT | Mod: CPTII,S$GLB,, | Performed by: FAMILY MEDICINE

## 2023-02-16 PROCEDURE — 3074F PR MOST RECENT SYSTOLIC BLOOD PRESSURE < 130 MM HG: ICD-10-PCS | Mod: CPTII,S$GLB,, | Performed by: FAMILY MEDICINE

## 2023-02-16 PROCEDURE — 1159F MED LIST DOCD IN RCRD: CPT | Mod: CPTII,S$GLB,, | Performed by: FAMILY MEDICINE

## 2023-02-16 PROCEDURE — 3008F BODY MASS INDEX DOCD: CPT | Mod: CPTII,S$GLB,, | Performed by: FAMILY MEDICINE

## 2023-02-16 PROCEDURE — 99215 OFFICE O/P EST HI 40 MIN: CPT | Mod: PBBFAC | Performed by: FAMILY MEDICINE

## 2023-02-16 PROCEDURE — 99999 PR PBB SHADOW E&M-EST. PATIENT-LVL V: CPT | Mod: PBBFAC,,, | Performed by: FAMILY MEDICINE

## 2023-02-16 PROCEDURE — 3079F PR MOST RECENT DIASTOLIC BLOOD PRESSURE 80-89 MM HG: ICD-10-PCS | Mod: CPTII,S$GLB,, | Performed by: FAMILY MEDICINE

## 2023-02-16 PROCEDURE — 3008F PR BODY MASS INDEX (BMI) DOCUMENTED: ICD-10-PCS | Mod: CPTII,S$GLB,, | Performed by: FAMILY MEDICINE

## 2023-02-16 RX ORDER — TRAMADOL HYDROCHLORIDE 50 MG/1
50 TABLET ORAL EVERY 6 HOURS
Qty: 20 TABLET | Refills: 0 | Status: SHIPPED | OUTPATIENT
Start: 2023-02-16 | End: 2023-04-05

## 2023-02-16 RX ORDER — CYCLOBENZAPRINE HCL 10 MG
10 TABLET ORAL 3 TIMES DAILY PRN
Qty: 30 TABLET | Refills: 0 | Status: SHIPPED | OUTPATIENT
Start: 2023-02-16 | End: 2023-02-26

## 2023-02-16 NOTE — PROGRESS NOTES
Subjective:       Patient ID: Brook Burdick is a 42 y.o. female.    Chief Complaint: Follow-up (med) and Knee Pain    HPI    Patient Active Problem List   Diagnosis    Chronic constipation    Family history of colonic polyps    Insulin resistance    Gastroesophageal reflux disease without esophagitis    Severe obesity (BMI >= 40)    Chronic headaches    Cough    Arthritis of knee    Neck pain    Right arm pain    Decreased ROM of intervertebral discs of cervical spine    Right arm weakness    Anxiety and depression    Bilateral carpal tunnel syndrome       Past Medical History:   Diagnosis Date    Arthritis of knee 2020    Encounter for blood transfusion     IBS (irritable bowel syndrome)     Liver disease     NALFD    Obesity     Pancreatitis        Past Surgical History:   Procedure Laterality Date    APPENDECTOMY      BILE DUCT EXPLORATION      CHOLECYSTECTOMY      INJECTION OF JOINT Bilateral 2020    Procedure: Bilateral intraarticular knee injection with local;  Surgeon: Ozzy Sofia MD;  Location: [facility];  Service: Pain Management;  Laterality: Bilateral;       Family History   Problem Relation Age of Onset    Atrial fibrillation Mother     Diabetes Father     Hypertension Father     Diabetes Sister     Hypertension Sister     Hypertension Brother     Diabetes Brother     Cancer Maternal Grandfather        Social History     Tobacco Use   Smoking Status Some Days    Types: Cigarettes    Last attempt to quit: 2022    Years since quittin.8   Smokeless Tobacco Never       Wt Readings from Last 5 Encounters:   23 (!) 155.7 kg (343 lb 2.3 oz)   22 (!) 154.7 kg (341 lb)   10/06/22 (!) 151.2 kg (333 lb 5.4 oz)   22 (!) 156.1 kg (344 lb 4 oz)   22 (!) 163.7 kg (361 lb)       For further HPI details, see assessment and plan.    Review of Systems    Objective:      Vitals:    23 1420   BP: 122/84   Pulse: 97   Temp: 98.8 °F (37.1 °C)       Physical  Exam  Constitutional:       General: She is not in acute distress.     Appearance: She is not ill-appearing.   Pulmonary:      Effort: Pulmonary effort is normal. No respiratory distress.   Musculoskeletal:      Left knee: Swelling present. No deformity, erythema, ecchymosis or lacerations. Tenderness present.   Neurological:      General: No focal deficit present.      Mental Status: She is alert.   Psychiatric:         Mood and Affect: Mood normal.         Behavior: Behavior normal.       Assessment:       1. Acute pain of right knee    2. Anxiety    3. Morbid obesity    4. Back pain, unspecified back location, unspecified back pain laterality, unspecified chronicity    5. Trouble in sleeping        Plan:   Acute pain of right knee  -     Cancel: X-Ray Knee 3 View Left; Future; Expected date: 02/16/2023  -     Ambulatory referral/consult to Orthopedics; Future; Expected date: 02/23/2023  -     X-ray Knee Ortho Left; Future; Expected date: 02/16/2023    Anxiety    Morbid obesity    Back pain, unspecified back location, unspecified back pain laterality, unspecified chronicity    Trouble in sleeping    Other orders  -     traMADoL (ULTRAM) 50 mg tablet; Take 1 tablet (50 mg total) by mouth every 6 (six) hours.  Dispense: 20 tablet; Refill: 0  -     cyclobenzaprine (FLEXERIL) 10 MG tablet; Take 1 tablet (10 mg total) by mouth 3 (three) times daily as needed.  Dispense: 30 tablet; Refill: 0        Patient presents for follow-up on previous medications and to discuss a new and acute knee problem.      At our last visit we had prescribed trazodone and exercise as a means of managing her mental health issues and sleep issues.      Since that visit she has been able to obtain health insurance and she decided to return to the Psychiatry Department.  She has been on doxepin and Lexapro.  She reports her depression and her sleep issues have improved with these medications.  She is no longer on trazodone.      She states she  was improving with her exercise habits.  However more recently she had a knee problem.  She twisted her left knee.  There was no other trauma.  She did not fall.  This was roughly 3 days ago.  Initially was okay but the following day after sitting in a restaurant [place] the pain began to become more severe and she noted some swelling.      She is able to walk but she is having pain with movement and bending of her knee.  She does have a history of known patellar issues and osteoarthritis in her knees.      Exam is quite limited due to pain.  She is tender to touch her knee diffusely.      Discussed using anti-inflammatory medications as the initial means of management.  However she tells me that NSAIDs have caused stomach problems for her before and she does not want to take drugs like meloxicam.  She is okay to take Tylenol but she has concerns that is not sufficient.      She has taken tramadol in the past and tolerated it well.  She has no history of seizures.  Seems reasonable and we will send in 20 tablets.  This is somewhat complicated by her the medication list.  Advised she avoid simultaneous use of doxepin and her muscle relaxants.      In discussions about muscle relaxants she would like to try Flexeril in place of her current muscle relaxant. Used for back pain per pt.  I will send in a trial of flexeril and she will determine the future which she prefers     We will place a consultation for Orthopedics for further evaluation.  If her knee improves after a few days it would be okay to cancel that visit.  I do feel getting an x-ray given the acute nature is reasonable.  Looking for gross abnormalities.  No nsaids given history of GI distrubance per patient and wants to avoid.      This note was verbally dictated, please excuse any type errors.

## 2023-02-17 ENCOUNTER — TELEPHONE (OUTPATIENT)
Dept: PAIN MEDICINE | Facility: CLINIC | Age: 43
End: 2023-02-17
Payer: COMMERCIAL

## 2023-02-17 NOTE — TELEPHONE ENCOUNTER
Called pt to schedule appointment with Dr. Noriega. Appointment scheduled for 03/13. All questions answered.

## 2023-02-20 ENCOUNTER — PATIENT MESSAGE (OUTPATIENT)
Dept: INTERNAL MEDICINE | Facility: CLINIC | Age: 43
End: 2023-02-20
Payer: COMMERCIAL

## 2023-02-20 DIAGNOSIS — M25.569 ACUTE KNEE PAIN, UNSPECIFIED LATERALITY: Primary | ICD-10-CM

## 2023-03-06 ENCOUNTER — OFFICE VISIT (OUTPATIENT)
Dept: PSYCHIATRY | Facility: CLINIC | Age: 43
End: 2023-03-06
Payer: COMMERCIAL

## 2023-03-06 DIAGNOSIS — F33.1 DEPRESSION, MAJOR, RECURRENT, MODERATE: Primary | ICD-10-CM

## 2023-03-06 DIAGNOSIS — F99 INSOMNIA DUE TO OTHER MENTAL DISORDER: ICD-10-CM

## 2023-03-06 DIAGNOSIS — F90.2 ATTENTION DEFICIT HYPERACTIVITY DISORDER (ADHD), COMBINED TYPE: ICD-10-CM

## 2023-03-06 DIAGNOSIS — F51.05 INSOMNIA DUE TO OTHER MENTAL DISORDER: ICD-10-CM

## 2023-03-06 DIAGNOSIS — F41.1 GENERALIZED ANXIETY DISORDER: ICD-10-CM

## 2023-03-06 PROCEDURE — 90863 PR PHARMACOLOGIC MANAGEMENT: ICD-10-PCS | Mod: 95,,, | Performed by: PSYCHOLOGIST

## 2023-03-06 PROCEDURE — 90832 PSYTX W PT 30 MINUTES: CPT | Mod: 95,,, | Performed by: PSYCHOLOGIST

## 2023-03-06 PROCEDURE — 1159F MED LIST DOCD IN RCRD: CPT | Mod: CPTII,95,, | Performed by: PSYCHOLOGIST

## 2023-03-06 PROCEDURE — 1159F PR MEDICATION LIST DOCUMENTED IN MEDICAL RECORD: ICD-10-PCS | Mod: CPTII,95,, | Performed by: PSYCHOLOGIST

## 2023-03-06 PROCEDURE — 90863 PHARMACOLOGIC MGMT W/PSYTX: CPT | Mod: 95,,, | Performed by: PSYCHOLOGIST

## 2023-03-06 PROCEDURE — 90832 PR PSYCHOTHERAPY W/PATIENT, 30 MIN: ICD-10-PCS | Mod: 95,,, | Performed by: PSYCHOLOGIST

## 2023-03-06 RX ORDER — LISDEXAMFETAMINE DIMESYLATE 60 MG/1
60 CAPSULE ORAL EVERY MORNING
Qty: 30 CAPSULE | Refills: 0 | Status: SHIPPED | OUTPATIENT
Start: 2023-03-06 | End: 2023-04-05

## 2023-03-06 RX ORDER — LISDEXAMFETAMINE DIMESYLATE 60 MG/1
60 CAPSULE ORAL EVERY MORNING
Qty: 30 CAPSULE | Refills: 0 | Status: SHIPPED | OUTPATIENT
Start: 2023-04-05 | End: 2023-05-05

## 2023-03-06 RX ORDER — LISDEXAMFETAMINE DIMESYLATE 60 MG/1
60 CAPSULE ORAL EVERY MORNING
Qty: 30 CAPSULE | Refills: 0 | Status: SHIPPED | OUTPATIENT
Start: 2023-05-05 | End: 2023-05-05

## 2023-03-06 NOTE — PATIENT INSTRUCTIONS

## 2023-03-06 NOTE — PROGRESS NOTES
"Outpatient Psychiatry Follow-Up Visit    3/6/2023    Timeframe: Corona Virus Outbreak     The patient location is: Patient's home/ Patient reported that his/her location at the time of this visit was in the [facility]     Visit type: Virtual visit with synchronous audio and video     Each patient to whom he or she provides medical services by telehealth is: (1) informed of the relationship between the medical psychologist and patient and the respective role of any other health care provider with respect to management of the patient; and (2) notified that he or she may decline to receive medical services by telehealth and may withdraw from such care at any time.    I also informed patient of the following:   Chasidy Anthony, PhD, MPAP:  [place] medical license number: [id number]    My contact info:  [facility] / 2nd Floor  [address] [place] [address]   Ph: [phone]    If technology issues, call office phone: Ph: [phone]  If crisis: Dial 911 or go to nearest Emergency Room (ER)  If questions related to privacy practices: contact [facility] Information Department: [phone]    Chief Complaint:  Brook Burdick is a 42 y.o. female who presents today for follow-up of depression and anxiety.       Impressions/Plan from last visit: Allegra attended her virtual visit. She said that she has not been taking any psychotropic medicines--had "horrible withdrawals from Lexapro." She said that she had phyiscal symptoms; then emotional/mental. She broke up with her boyfriend of two years. After the new year, she felt a little better but is still having high anxiety. She has been having trouble sleeping--has taken trazodone but feels like she also needs benedryl or something. 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 This is her last week of her practicum--she does have her minimum hours. She did a " practicum at [employer]. She will be done in August of this year with her degree. 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 She plans to do both. Her younger sister lives in [place]--she has support. We agreed to restart her medicines.  She also asked about something to help her with sleep besides trazodone.  We reviewed drug drug interactions, and agreed to a trial of doxepin.  We are expecting that once her Lexapro is up to a therapeutic level, she will no longer need something to help her with sleep.  We also discussed her looking for a provider in [place] if she moves there.  We will make sure that she has enough refills of her Lexapro, but she will need to get someone in [place] to prescribe her stimulants medicines after a move.      Plan--restart Lexapro 5 mg for 8 days, then 10 mg for 2 weeks, then 15 mg; Vyvanse 60 mg (sent 3 scripts); trial of doxepin 10 mg hs     Interval History and Content of Current Session: Allegra attended her virtual visit. She got a job working with [employer] and is working from home. She decided to stay in [place]. She is still in training with the job--does like it. 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 She said that she had fluid on her knee and saw her PCP--started flexeril and tramadol for the knee--has not been taking doxepin. Her knee is better, and she thinks that she will be stopping those medicines soon. She has not slept as well without the doxepin. She is not having crying spells--feels more like herself. She is struggling with allergies with the pollen. She is still taking Lexapro and Vyvanse, though she does not take Vyvanse daily. She takes it when she has paperwork, etc. When asked about self-care, she is getting back into gardening now that it's  springtime; she plays with her puppy outside (he likes her attention). She does a lot of arts and crafts. She also enjoys the kids that she sees--she gets a lot of twila with seeing them. She also sees her sisters and niece and nephew. Her nephew is about to go to college--they are close. He will be going to [employer]. 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 She is with [employer]--she will be there for this one and the next one. She and her boyfriend broke up in December--she is in a better place with it now. She is busy with school and work and is not interested in dating right now. She does not have any side effects that she can tell--has had some weight fluctuation. We discussed trying Lexapro 10 mg--she may try that.     Plan--continue Lexapro 15 mg; doxepin 10 mg hs prn; Vyvanse 60 mg (sent 3 scripts)      since November 2022.        PSYCHOTHERAPY      Site: [facility]  Time: 16 minutes  Participants: Met with patient    Therapeutic Intervention Type: behavior modifying psychotherapy, supportive psychotherapy  Why chosen therapy is appropriate versus another modality: patient responds to this modality, evidence based practice    Target symptoms: depression, distractability, anxiety   Primary focus: see above    Outcome monitoring methods: self-report, observation    Patient's response to intervention:  The patient's response to intervention is accepting.    Progress toward goals:  The patient's progress toward goals is good.    GAD7 3/6/2023 1/6/2023 6/14/2022   1. Feeling nervous, anxious, or on edge? 1 3 2   2. Not being able to stop or control worrying? 1 3 1   3. Worrying too much about different things? 1 3 2   4. Trouble relaxing? 1 3 2   5. Being so restless that it is hard to sit still? 1 3 2   6. Becoming easily annoyed or irritable? 1 3 3   7. Feeling afraid as if something awful might happen? 1 3 1   8. If you checked off any problems, how  difficult have these problems made it for you to do your work, take care of things at home, or get along with other people? - - -   KATHLEEN-7 Score 7 21 13      0-4 = Minimal anxiety  5-9 = Mild anxiety  10-14 = Moderate anxiety  15-21 = Severe anxiety       Review of Systems   PSYCHIATRIC: Pertinant items are noted in the narrative.    Past Medical, Family and Social History: The patient's past medical, family and social history have been reviewed and updated as appropriate within the electronic medical record - see encounter notes.      Current Outpatient Medications:     acetaminophen (TYLENOL) 650 MG TbSR, Take 650 mg by mouth 2 (two) times a day., Disp: , Rfl:     diclofenac sodium (VOLTAREN) 1 % Gel, Apply 2 g topically 4 (four) times daily as needed (Pain)., Disp: 100 g, Rfl: 2    doxepin (SINEQUAN) 10 MG capsule, Take 1 capsule (10 mg total) by mouth every evening., Disp: 30 capsule, Rfl: 2    erythromycin (ROMYCIN) ophthalmic ointment, Place into the left eye 2 (two) times a day., Disp: 1 each, Rfl: 3    EScitalopram oxalate (LEXAPRO) 10 MG tablet, Take 1.5 tablets (15 mg total) by mouth once daily., Disp: 45 tablet, Rfl: 5    lisdexamfetamine (VYVANSE) 60 MG capsule, Take 1 capsule (60 mg total) by mouth every morning., Disp: 30 capsule, Rfl: 0    [START ON 4/5/2023] lisdexamfetamine (VYVANSE) 60 MG capsule, Take 1 capsule (60 mg total) by mouth every morning., Disp: 30 capsule, Rfl: 0    [START ON 5/5/2023] lisdexamfetamine (VYVANSE) 60 MG capsule, Take 1 capsule (60 mg total) by mouth every morning., Disp: 30 capsule, Rfl: 0    nystatin-triamcinolone (MYCOLOG II) cream, , Disp: , Rfl:     pregabalin (LYRICA) 150 MG capsule, Take 1 capsule (150 mg total) by mouth 2 (two) times daily., Disp: 60 capsule, Rfl: 2    semaglutide (OZEMPIC) 0.25 mg or 0.5 mg(2 mg/1.5 mL) pen injector, Inject 0.25 mg into the skin every 7 days., Disp: 4 pen, Rfl: 1    tiZANidine (ZANAFLEX) 4 MG tablet, TAKE 1/2 TO 1 TABLET BY MOUTH  TWICE DAILY AS NEEDED FOR MUSCLE SPASMS. MAY CAUSE DROWSINESS, Disp: 60 tablet, Rfl: 1    traMADoL (ULTRAM) 50 mg tablet, Take 1 tablet (50 mg total) by mouth every 6 (six) hours., Disp: 20 tablet, Rfl: 0    Compliance: yes    Side effects: see above    Risk Parameters:  Patient reports no suicidal ideation  Patient reports no homicidal ideation  Patient reports no self-injurious behavior  Patient reports no violent behavior    Exam (detailed: at least 9 elements; comprehensive: all 15 elements)   Constitutional  Vitals:  Most recent vital signs were reviewed.   Last 3 sets of Vitals    Vitals - 1 value per visit 12/14/2022 2/16/2023 2/16/2023   SYSTOLIC 134 - 122   DIASTOLIC 77 - 84   Pulse 71 - 97   Temp 96.8 - 98.8   Resp 16 - -   SPO2 96 - 96   Weight (lb) 341 - 343.15   Weight (kg) 154.677 - 155.65   Height 72 - 72   BMI (Calculated) 46.2 - 46.5   VISIT REPORT - - -   Pain Score  - 8 -   Some recent data might be hidden          General:  age appropriate, casually dressed, neatly groomed, hair back with scarf     Musculoskeletal  Muscle Strength/Tone:  no tremor, no tic   Gait & Station:  video visit     Psychiatric  Speech:  no latency; no press   Behavior: wnl   Mood & Affect:  anxious  congruent and appropriate   Thought Process:  normal and logical   Associations:  intact   Thought Content:  normal, no suicidality, no homicidality, delusions, or paranoia   Insight:  has awareness of illness   Judgement: behavior is adequate to circumstances   Orientation:  grossly intact   Memory: intact for content of interview   Language: grossly intact   Attention Span & Concentration:  Grossly intact   Fund of Knowledge:  intact and appropriate to age and level of education     Assessment and Diagnosis   Status/Progress: Based on the examination today, the patient's problem(s) is/are improved.  New problems have not been presented today.   Co-morbidities are complicating management of the primary condition.  The working  differential for this patient includes unclear about ADHD dx--.     General Impression:     Encounter Diagnoses   Name Primary?    Depression, major, recurrent, moderate Yes    Generalized anxiety disorder     Attention deficit hyperactivity disorder (ADHD), combined type     Insomnia due to other mental disorder        Intervention/Counseling/Treatment Plan   Medication Management: Discussed risks, benefits, and alternatives to treatment plan documented above with patient. I answered all patient questions related to this plan, and patient expressed understanding and agreement.   continue Lexapro 15 mg; doxepin 10 mg hs prn; Vyvanse 60 mg (sent 3 scripts)  Consider counseling    Healthy eating/exercise  Consider switching Lexapro to mornings--take in late afternoon and then the next morning    Medication List with Changes/Refills   New Medications    LISDEXAMFETAMINE (VYVANSE) 60 MG CAPSULE    Take 1 capsule (60 mg total) by mouth every morning.    LISDEXAMFETAMINE (VYVANSE) 60 MG CAPSULE    Take 1 capsule (60 mg total) by mouth every morning.    LISDEXAMFETAMINE (VYVANSE) 60 MG CAPSULE    Take 1 capsule (60 mg total) by mouth every morning.   Current Medications    ACETAMINOPHEN (TYLENOL) 650 MG TBSR    Take 650 mg by mouth 2 (two) times a day.    DICLOFENAC SODIUM (VOLTAREN) 1 % GEL    Apply 2 g topically 4 (four) times daily as needed (Pain).    DOXEPIN (SINEQUAN) 10 MG CAPSULE    Take 1 capsule (10 mg total) by mouth every evening.    ERYTHROMYCIN (ROMYCIN) OPHTHALMIC OINTMENT    Place into the left eye 2 (two) times a day.    ESCITALOPRAM OXALATE (LEXAPRO) 10 MG TABLET    Take 1.5 tablets (15 mg total) by mouth once daily.    LISDEXAMFETAMINE (VYVANSE) 60 MG CAPSULE    Take 1 capsule (60 mg total) by mouth every morning.    NYSTATIN-TRIAMCINOLONE (MYCOLOG II) CREAM        PREGABALIN (LYRICA) 150 MG CAPSULE    Take 1 capsule (150 mg total) by mouth 2 (two) times daily.    SEMAGLUTIDE (OZEMPIC) 0.25 MG OR 0.5  MG(2 MG/1.5 ML) PEN INJECTOR    Inject 0.25 mg into the skin every 7 days.    TIZANIDINE (ZANAFLEX) 4 MG TABLET    TAKE 1/2 TO 1 TABLET BY MOUTH TWICE DAILY AS NEEDED FOR MUSCLE SPASMS. MAY CAUSE DROWSINESS    TRAMADOL (ULTRAM) 50 MG TABLET    Take 1 tablet (50 mg total) by mouth every 6 (six) hours.   Discontinued Medications    LISDEXAMFETAMINE (VYVANSE) 60 MG CAPSULE    Take 1 capsule (60 mg total) by mouth every morning.    LISDEXAMFETAMINE (VYVANSE) 60 MG CAPSULE    Take 1 capsule (60 mg total) by mouth every morning.        Return to Clinic: 3 months    I spent an additional 17 minutes performing E/M services with >50% spent on counseling, guidance, coordinating care (not Psychotherapy related) in addition to the 16 minutes performing Psychotherapy.    Time spent with pt including note preparation: 33 minutes       Chasidy Anthony, PhD, MP  Advanced Practice Medical Psychologist  [facility]  [address] [facility] [address]  [place] [address]  [phone]   [phone] fax

## 2023-03-08 ENCOUNTER — PATIENT MESSAGE (OUTPATIENT)
Dept: INTERNAL MEDICINE | Facility: CLINIC | Age: 43
End: 2023-03-08
Payer: COMMERCIAL

## 2023-03-30 ENCOUNTER — PATIENT MESSAGE (OUTPATIENT)
Dept: INTERNAL MEDICINE | Facility: CLINIC | Age: 43
End: 2023-03-30
Payer: COMMERCIAL

## 2023-04-03 ENCOUNTER — PATIENT MESSAGE (OUTPATIENT)
Dept: PSYCHIATRY | Facility: CLINIC | Age: 43
End: 2023-04-03
Payer: COMMERCIAL

## 2023-04-05 ENCOUNTER — OFFICE VISIT (OUTPATIENT)
Dept: URGENT CARE | Facility: CLINIC | Age: 43
End: 2023-04-05
Payer: COMMERCIAL

## 2023-04-05 VITALS
HEIGHT: 72 IN | WEIGHT: 293 LBS | HEART RATE: 74 BPM | TEMPERATURE: 98 F | RESPIRATION RATE: 16 BRPM | DIASTOLIC BLOOD PRESSURE: 81 MMHG | SYSTOLIC BLOOD PRESSURE: 148 MMHG | BODY MASS INDEX: 39.68 KG/M2 | OXYGEN SATURATION: 96 %

## 2023-04-05 DIAGNOSIS — H60.543 ECZEMA OF EXTERNAL EAR, BILATERAL: Primary | ICD-10-CM

## 2023-04-05 PROCEDURE — 99213 PR OFFICE/OUTPT VISIT, EST, LEVL III, 20-29 MIN: ICD-10-PCS | Mod: S$GLB,,, | Performed by: PHYSICIAN ASSISTANT

## 2023-04-05 PROCEDURE — 99213 OFFICE O/P EST LOW 20 MIN: CPT | Mod: S$GLB,,, | Performed by: PHYSICIAN ASSISTANT

## 2023-04-06 ENCOUNTER — E-CONSULT (OUTPATIENT)
Dept: DERMATOLOGY | Facility: CLINIC | Age: 43
End: 2023-04-06
Payer: COMMERCIAL

## 2023-04-06 DIAGNOSIS — L21.9 SEBORRHEIC DERMATITIS: Primary | ICD-10-CM

## 2023-04-06 PROCEDURE — 99451 NTRPROF PH1/NTRNET/EHR 5/>: CPT | Mod: ,,, | Performed by: STUDENT IN AN ORGANIZED HEALTH CARE EDUCATION/TRAINING PROGRAM

## 2023-04-06 PROCEDURE — 99451 PR INTERPROF, PHONE/INTERNET/EHR, CONSULT, >= 5MINS: ICD-10-PCS | Mod: ,,, | Performed by: STUDENT IN AN ORGANIZED HEALTH CARE EDUCATION/TRAINING PROGRAM

## 2023-04-06 NOTE — CONSULTS
[facility]  Response for E-Consult     Patient Name: Brook Burdick  MRN: [id number]  Primary Care Provider: Yoan Henry MD   Requesting Provider: Lyric Thorne PA-C  E-Consult to Dermatology  Consult performed by: Kailey Chairez MD  Consult ordered by: Lyric Thorne PA-C  Reason for consult: Ear rash  Assessment/Recommendations: Thank you for this consult. I frequently see seborrheic dermatitis involve the ear canal and can be intensely pruritic. I prefer to use a steroid based solution such as fluocinolone or fluocinonide or mometasone (depending on what her insurance covers). I prefer to drop several drops on a cotton tip applicator and apply to the ear canal 2-3 x daily. She can also do a thin layer of antifungal cream such as clotrimazole, being sure not to use an amount that can clog up her ear canal.         Findings: Thank you for this consult. I frequently see seborrheic dermatitis involve the ear canal and can be intensely pruritic. I prefer to use a steroid based solution such as fluocinolone or fluocinonide or mometasone (depending on what her insurance covers). I prefer to drop several drops on a cotton tip applicator and apply to the ear canal 2-3 x daily. She can also do a thin layer of antifungal cream such as clotrimazole, being sure not to use an amount that can clog up her ear canal.     I did not speak to the requesting provider verbally about this.     Total time of Consultation: 10 minute    Percentage of time spent on verbal/written discussion: 100%     *This eConsult is based on the clinical data available to me and is furnished without benefit of a physical examination. The eConsult will need to be interpreted in light of any clinical issues or changes in patient status not available to me at the time of filing this eConsults. Significant changes in patient condition or level of acuity should result in immediate formal consultation and reevaluation. Please alert  me if you have further questions.    Thank you for your consult.     Kailey Chairez MD  [facility]

## 2023-04-14 ENCOUNTER — PATIENT MESSAGE (OUTPATIENT)
Dept: OPHTHALMOLOGY | Facility: CLINIC | Age: 43
End: 2023-04-14
Payer: COMMERCIAL

## 2023-04-15 ENCOUNTER — OFFICE VISIT (OUTPATIENT)
Dept: URGENT CARE | Facility: CLINIC | Age: 43
End: 2023-04-15
Payer: COMMERCIAL

## 2023-04-15 VITALS
RESPIRATION RATE: 16 BRPM | HEIGHT: 72 IN | DIASTOLIC BLOOD PRESSURE: 68 MMHG | TEMPERATURE: 98 F | WEIGHT: 293 LBS | HEART RATE: 68 BPM | OXYGEN SATURATION: 97 % | BODY MASS INDEX: 39.68 KG/M2 | SYSTOLIC BLOOD PRESSURE: 143 MMHG

## 2023-04-15 DIAGNOSIS — H93.8X1: ICD-10-CM

## 2023-04-15 DIAGNOSIS — H60.541: Primary | ICD-10-CM

## 2023-04-15 PROCEDURE — 99213 OFFICE O/P EST LOW 20 MIN: CPT | Mod: S$GLB,,, | Performed by: NURSE PRACTITIONER

## 2023-04-15 PROCEDURE — 99213 PR OFFICE/OUTPT VISIT, EST, LEVL III, 20-29 MIN: ICD-10-PCS | Mod: S$GLB,,, | Performed by: NURSE PRACTITIONER

## 2023-04-15 RX ORDER — FLUOCINOLONE ACETONIDE 0.11 MG/ML
OIL AURICULAR (OTIC) 2 TIMES DAILY
COMMUNITY
Start: 2023-04-11

## 2023-04-15 RX ORDER — MOMETASONE FUROATE 1 MG/G
CREAM TOPICAL 2 TIMES DAILY
COMMUNITY
Start: 2023-04-11 | End: 2023-05-05

## 2023-04-15 RX ORDER — FLUCONAZOLE 150 MG/1
150 TABLET ORAL
COMMUNITY
Start: 2023-04-11 | End: 2023-05-05

## 2023-04-15 RX ORDER — CIPROFLOXACIN AND DEXAMETHASONE 3; 1 MG/ML; MG/ML
4 SUSPENSION/ DROPS AURICULAR (OTIC) 2 TIMES DAILY
Qty: 7.5 ML | Refills: 0 | Status: SHIPPED | OUTPATIENT
Start: 2023-04-15 | End: 2023-04-25

## 2023-04-15 NOTE — PROGRESS NOTES
Subjective:      Patient ID: Brook Burdick is a 42 y.o. female.    Vitals:  height is 6' (1.829 m) and weight is 155.6 kg (343 lb) (abnormal). Her tympanic temperature is 97.6 °F (36.4 °C). Her blood pressure is 143/68 (abnormal) and her pulse is 68. Her respiration is 16 and oxygen saturation is 97%.     Chief Complaint: Otalgia    Brook Burdick is a 42 year old female whom presents to urgent care with complaints of right ear pain and swelling. Patient denies any recent trauma to the ear, discharge or recent swimming. Patient reports she has a history of eczema to bilateral ears. Pain is 9/10.    Otalgia   There is pain in the right ear. This is a new problem. The current episode started yesterday. The problem has been gradually worsening. There has been no fever. The pain is at a severity of 9/10. Pertinent negatives include no abdominal pain, coughing, diarrhea, ear discharge, headaches, hearing loss, neck pain, rash, rhinorrhea, sore throat or vomiting. She has tried nothing for the symptoms.     HENT:  Positive for ear pain. Negative for ear discharge, hearing loss and sore throat.    Neck: Negative for neck pain.   Respiratory:  Negative for cough.    Gastrointestinal:  Negative for abdominal pain, vomiting and diarrhea.   Skin:  Negative for rash.   Neurological:  Negative for headaches.    Objective:     Physical Exam   HENT:   Ears:   Right Ear: There is swelling and tenderness. No no drainage.   Left Ear: Hearing, tympanic membrane, external ear and ear canal normal.   Unable to visualize TM due to swelling.       Comments: Unable to visualize TM due to swelling.   Neurological: no focal deficit. She is at baseline.   Skin: Capillary refill takes less than 2 seconds.   Nursing note and vitals reviewed.chaperone present       Assessment:     1. Acute eczematoid otitis externa, right ear    2. Swelling of structure of right ear        Plan:       Acute eczematoid otitis externa, right ear  -      ciprofloxacin-dexAMETHasone 0.3-0.1% (CIPRODEX) 0.3-0.1 % DrpS; Place 4 drops into the right ear 2 (two) times daily. for 10 days  Dispense: 7.5 mL; Refill: 0  -     Ambulatory referral/consult to ENT    Swelling of structure of right ear  -     ciprofloxacin-dexAMETHasone 0.3-0.1% (CIPRODEX) 0.3-0.1 % DrpS; Place 4 drops into the right ear 2 (two) times daily. for 10 days  Dispense: 7.5 mL; Refill: 0  -     Ambulatory referral/consult to ENT          Medical Decision Making:   Differential Diagnosis:   OM, otitis externa, TMJ  Urgent Care Management:  Symptoms  and examination consistent with otitis externa. Plan of care as outlined above. Symptomatic treatment discussed such as warm compress and tylenol or ibuprofen as needed for pain. All of the patients questions and concerns were addressed prior to discharge. Patient was instructed to follow up with her PCP if no improvement in symptoms in 5 days report to the nearest ER with new/concerning or worsening symptoms. Patient verbalized understanding and agrees with the discussed plan of care. Patient remained stable throughout the visit and exited the exam room in NAD.          Patient Instructions   A referral has been placed for you to follow up with ENT. Someone should be contacting you soon to set up that appointment. However, you may call [phone] at anytime to schedule this follow up appointment.    Please keep ears clean and dry. Avoid sticking any objects into the ear.  Utilize a warm compress as needed for discomfort.     Please complete your entire course of antibiotics as prescribed to ensure effectiveness.   Start Antihistamine such as zyrtec, Claritin, xyzal to help with effusion (fluid)  Tylenol/Ibuprofen as needed for pain/discomfort.     Please arrange follow up with your primary medical clinic as soon as possible if symptoms worsen or persist. You must understand that you've received an Urgent Care treatment only and that you may be released  before all of your medical problems are known or treated. You, the patient, will arrange for follow up as instructed. If your symptoms worsen or fail to improve you should go to the Emergency Room.

## 2023-04-15 NOTE — PATIENT INSTRUCTIONS
A referral has been placed for you to follow up with ENT. Someone should be contacting you soon to set up that appointment. However, you may call [phone] at anytime to schedule this follow up appointment.    Please keep ears clean and dry. Avoid sticking any objects into the ear.  Utilize a warm compress as needed for discomfort.     Please complete your entire course of antibiotics as prescribed to ensure effectiveness.   Start Antihistamine such as zyrtec, Claritin, xyzal to help with effusion (fluid)  Tylenol/Ibuprofen as needed for pain/discomfort.     Please arrange follow up with your primary medical clinic as soon as possible if symptoms worsen or persist. You must understand that you've received an Urgent Care treatment only and that you may be released before all of your medical problems are known or treated. You, the patient, will arrange for follow up as instructed. If your symptoms worsen or fail to improve you should go to the Emergency Room.

## 2023-04-18 ENCOUNTER — TELEPHONE (OUTPATIENT)
Dept: URGENT CARE | Facility: CLINIC | Age: 43
End: 2023-04-18
Payer: COMMERCIAL

## 2023-04-18 ENCOUNTER — OFFICE VISIT (OUTPATIENT)
Dept: OTOLARYNGOLOGY | Facility: CLINIC | Age: 43
End: 2023-04-18
Payer: COMMERCIAL

## 2023-04-18 VITALS — WEIGHT: 254.88 LBS | HEIGHT: 72 IN | TEMPERATURE: 99 F | BODY MASS INDEX: 34.52 KG/M2

## 2023-04-18 DIAGNOSIS — S00.411A ABRASION OF RIGHT EAR CANAL, INITIAL ENCOUNTER: Primary | ICD-10-CM

## 2023-04-18 DIAGNOSIS — H60.01 ABSCESS OF RIGHT EAR CANAL: ICD-10-CM

## 2023-04-18 DIAGNOSIS — H93.8X1: ICD-10-CM

## 2023-04-18 DIAGNOSIS — H60.541: ICD-10-CM

## 2023-04-18 PROCEDURE — 69020 PR DRAIN EXT AUD CANAL ABSCESS: ICD-10-PCS | Mod: RT,S$GLB,, | Performed by: STUDENT IN AN ORGANIZED HEALTH CARE EDUCATION/TRAINING PROGRAM

## 2023-04-18 PROCEDURE — 87070 CULTURE OTHR SPECIMN AEROBIC: CPT | Performed by: STUDENT IN AN ORGANIZED HEALTH CARE EDUCATION/TRAINING PROGRAM

## 2023-04-18 PROCEDURE — 1159F PR MEDICATION LIST DOCUMENTED IN MEDICAL RECORD: ICD-10-PCS | Mod: CPTII,S$GLB,, | Performed by: STUDENT IN AN ORGANIZED HEALTH CARE EDUCATION/TRAINING PROGRAM

## 2023-04-18 PROCEDURE — 3008F PR BODY MASS INDEX (BMI) DOCUMENTED: ICD-10-PCS | Mod: CPTII,S$GLB,, | Performed by: STUDENT IN AN ORGANIZED HEALTH CARE EDUCATION/TRAINING PROGRAM

## 2023-04-18 PROCEDURE — 99999 PR PBB SHADOW E&M-EST. PATIENT-LVL III: CPT | Mod: PBBFAC,,, | Performed by: STUDENT IN AN ORGANIZED HEALTH CARE EDUCATION/TRAINING PROGRAM

## 2023-04-18 PROCEDURE — 69020 DRG XTRNL AUD CANAL ABSCESS: CPT | Mod: RT,S$GLB,, | Performed by: STUDENT IN AN ORGANIZED HEALTH CARE EDUCATION/TRAINING PROGRAM

## 2023-04-18 PROCEDURE — 99203 PR OFFICE/OUTPT VISIT, NEW, LEVL III, 30-44 MIN: ICD-10-PCS | Mod: 25,S$GLB,, | Performed by: STUDENT IN AN ORGANIZED HEALTH CARE EDUCATION/TRAINING PROGRAM

## 2023-04-18 PROCEDURE — 99203 OFFICE O/P NEW LOW 30 MIN: CPT | Mod: 25,S$GLB,, | Performed by: STUDENT IN AN ORGANIZED HEALTH CARE EDUCATION/TRAINING PROGRAM

## 2023-04-18 PROCEDURE — 3008F BODY MASS INDEX DOCD: CPT | Mod: CPTII,S$GLB,, | Performed by: STUDENT IN AN ORGANIZED HEALTH CARE EDUCATION/TRAINING PROGRAM

## 2023-04-18 PROCEDURE — 1159F MED LIST DOCD IN RCRD: CPT | Mod: CPTII,S$GLB,, | Performed by: STUDENT IN AN ORGANIZED HEALTH CARE EDUCATION/TRAINING PROGRAM

## 2023-04-18 PROCEDURE — 99999 PR PBB SHADOW E&M-EST. PATIENT-LVL III: ICD-10-PCS | Mod: PBBFAC,,, | Performed by: STUDENT IN AN ORGANIZED HEALTH CARE EDUCATION/TRAINING PROGRAM

## 2023-04-18 RX ORDER — SULFAMETHOXAZOLE AND TRIMETHOPRIM 800; 160 MG/1; MG/1
1 TABLET ORAL 2 TIMES DAILY
Qty: 14 TABLET | Refills: 0 | Status: SHIPPED | OUTPATIENT
Start: 2023-04-18 | End: 2023-05-05

## 2023-04-18 RX ORDER — MUPIROCIN 20 MG/G
OINTMENT TOPICAL 3 TIMES DAILY
Qty: 22 G | Refills: 0 | Status: SHIPPED | OUTPATIENT
Start: 2023-04-18 | End: 2023-04-25

## 2023-04-18 NOTE — PROGRESS NOTES
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 capsule; Take 1 capsule (150 mg total) by mouth 2 (two) times daily.  Dispense: 60 capsule; Refill: 2    Other orders  -     naproxen (EC-NAPROSYN) 375 MG TbEC EC tablet; Take 1 tablet (375 mg total) by mouth 2 (two) times daily with meals.  Dispense: 60 tablet; Refill: 2              PLAN:   - Interventional:  None at this time     - Pharmacologic:   -Refill Lyrica to 150 mg b.i.d.  -refill tizanidine 4 mg twice a day as needed  - add naproxen 375 mg b.i.d. p.r.n  -continue Voltaren gel 4 times a day as needed  -discontinues zonisamide  -continue Tylenol Extra Strength p.r.n.  - Anticoagulation use: None.          - Rehabilitative: Encouraged regular exercise. Continue exercises and activities as tolerated.  Patient requesting medical massage therapy, will refer externally to central physical therapy.     - Diagnostic:  Reviewed     - Follow up:   8-10 weeks or as needed     - This condition does not require this patient to take time off of work, and the primary goal of our Pain Management services is to improve the patient's functional capacity.      - I discussed the risks, benefits, and alternatives to potential treatment options. All questions and concerns were fully addressed today in clinic.     The above plan and management options were  discussed at length with patient. Patient is in agreement with the above and verbalized understanding.      Ricki Noriega MD  Interventional Pain Management  [facility]    Disclaimer:  This note was prepared using voice recognition system and is likely to have sound alike errors that may have been overlooked even after proof reading.  Please call me with any questions

## 2023-04-18 NOTE — PROGRESS NOTES
Chief complaint:   Chief Complaint   Patient presents with    Other     Ear pain x 4 days           Referring Provider:  Bello Traylor Np  [address] [address] [facility]  [address]  [place] [address]    History of Present Illness:     Ms. Burdick is a 42 y.o. female presenting for evaluation of right ear pain and swelling for the last 4 days.   Additional symptoms that also have been associated are muffled hearing, mass of ear canal, drainage. The patient has tried ciprodex with some relief.  The patient denies hearing loss, vertigo.      Does have itchy ears all the time.     The patient denies significant hearing loss risk factors, ototoxic medication exposure, chronic vestibular suppressant use, head/ facial/ merry trauma, and otologic surgery.        History        Past Medical History:   Past Medical History:   Diagnosis Date    Arthritis of knee 9/30/2020    Encounter for blood transfusion     IBS (irritable bowel syndrome)     Liver disease     NALFD    Obesity     Pancreatitis     .          Past Surgical History:  Past Surgical History:   Procedure Laterality Date    APPENDECTOMY      BILE DUCT EXPLORATION      CHOLECYSTECTOMY      INJECTION OF JOINT Bilateral 9/30/2020    Procedure: Bilateral intraarticular knee injection with local;  Surgeon: Ozzy Sofia MD;  Location: [facility];  Service: Pain Management;  Laterality: Bilateral;   .         Medications: Medication list was reviewed. She  has a current medication list which includes the following prescription(s): acetaminophen, ciprofloxacin-dexamethasone 0.3-0.1%, diclofenac sodium, doxepin, erythromycin, escitalopram oxalate, fluconazole, fluocinolone acetonide oil, lisdexamfetamine, [START ON 5/5/2023] lisdexamfetamine, mometasone 0.1%, nystatin-triamcinolone, pregabalin, tizanidine, and [DISCONTINUED] sertraline.         Allergies:   Review of patient's allergies indicates:   Allergen Reactions    Penicillins Hives            Family history:  family history includes Atrial fibrillation in her mother; Cancer in her maternal grandfather; Diabetes in her brother, father, and sister; Hypertension in her brother, father, and sister.         Social History          Alcohol use:  reports no history of alcohol use.            Tobacco:  reports that she quit smoking about a year ago. Her smoking use included cigarettes. She has never used smokeless tobacco.         Please see the patient's intake form for full details of past medical history, past surgical history, family history, social history and review of systems. ?This information was reviewed by me and noted.      Physical Examination     General: Well developed, well nourished, well hydrated. Verbal with a strong voice and not dysphonic.     Head/Face: Normocephalic, atraumatic. No scars or lesions. Facial musculature equal.     Eyes: No scleral icterus or conjunctival hemorrhage. EOMI. PERRLA.     Ears:     Right ear: No gross deformity. Fluctuant mass with drainage head right lateral EAC. The TM is intact with a pneumatized middle ear. No signs of retraction, fluid or infection.      Left ear: No gross deformity. EAC is clear of debris and erythema. The TM is intact with a pneumatized middle ear. No signs of retraction, fluid or infection.     Hearing: grossly intact    Nose: No gross deformity or lesions. No purulent discharge. No significant NSD.      Mouth/Oropharynx: Lips without any lesions. No mucosal lesions within the oropharynx. No tonsillar exudate or lesions. Pharyngeal walls symmetrical. Uvula midline. Tongue midline without lesions.     Neck: Trachea midline. No masses. No thyromegaly or nodules palpated.     Lymphatic: No lymphadenopathy in the neck.     Extremities: No cyanosis. Warm and well-perfused.     Skin: No scars or lesions on face or neck.      Neurologic: Moving all extremities without gross abnormality.CN II-XII grossly intact. House-Brackmann 1/6. No signs of nystagmus.       Psych: Alert and oriented to person, place, and time with an appropriate mood and affect.     Data review:    Review of records:      I reviewed records from the referring provider's office visits.  These describe the history, workup, and/or treatment of this problem thus far.    Ear Microscopy with incision and drainage of EAC abscess (right)    Indication: microscopy was required for removal of obstructing pathology and adequate visualization of the tympanic membrane and middle ear    Technique: The patient was placed in a semi-recumbent position.  The right ear was first inspected under the microscope. There was evidence of abscess of the lateral canal. The skin was cleansed. 1% lidocaine with 1:100, 000 epinephrine was injected. Incision was made with myringotomy blade. The pocket was opened. The drainage was cultured. The pocket was flushed with saline.         Assessment/Plan:      1. Abrasion of right ear canal, initial encounter         Mupirocin, bactrim  Keep ear dry  Return to clinic 1 week  Restart dermotic once healed      Kar Rodriguez MD  [facility]   [facility]  [id number] [place] [address].  [place] [id number]  P: [phone]  F: [phone]

## 2023-04-19 ENCOUNTER — PATIENT MESSAGE (OUTPATIENT)
Dept: RESEARCH | Facility: HOSPITAL | Age: 43
End: 2023-04-19
Payer: COMMERCIAL

## 2023-04-19 ENCOUNTER — OFFICE VISIT (OUTPATIENT)
Dept: PAIN MEDICINE | Facility: CLINIC | Age: 43
End: 2023-04-19
Payer: COMMERCIAL

## 2023-04-19 DIAGNOSIS — M54.16 LUMBAR RADICULOPATHY: ICD-10-CM

## 2023-04-19 DIAGNOSIS — M25.561 CHRONIC PAIN OF BOTH KNEES: ICD-10-CM

## 2023-04-19 DIAGNOSIS — M79.12 MYALGIA OF AUXILIARY MUSCLES, HEAD AND NECK: ICD-10-CM

## 2023-04-19 DIAGNOSIS — M54.12 CERVICAL RADICULOPATHY: ICD-10-CM

## 2023-04-19 DIAGNOSIS — M79.18 CHRONIC MYOFASCIAL PAIN: ICD-10-CM

## 2023-04-19 DIAGNOSIS — M25.562 CHRONIC PAIN OF BOTH KNEES: ICD-10-CM

## 2023-04-19 DIAGNOSIS — G89.29 CHRONIC MYOFASCIAL PAIN: ICD-10-CM

## 2023-04-19 DIAGNOSIS — M25.562 CHRONIC PAIN OF LEFT KNEE: ICD-10-CM

## 2023-04-19 DIAGNOSIS — G89.29 CHRONIC PAIN OF BOTH KNEES: ICD-10-CM

## 2023-04-19 DIAGNOSIS — G89.29 CHRONIC PAIN OF LEFT KNEE: ICD-10-CM

## 2023-04-19 DIAGNOSIS — M47.816 LUMBAR SPONDYLOSIS: ICD-10-CM

## 2023-04-19 DIAGNOSIS — E66.01 MORBID OBESITY WITH BMI OF 40.0-44.9, ADULT: ICD-10-CM

## 2023-04-19 PROCEDURE — 1160F RVW MEDS BY RX/DR IN RCRD: CPT | Mod: CPTII,95,, | Performed by: PHYSICAL MEDICINE & REHABILITATION

## 2023-04-19 PROCEDURE — 1159F PR MEDICATION LIST DOCUMENTED IN MEDICAL RECORD: ICD-10-PCS | Mod: CPTII,95,, | Performed by: PHYSICAL MEDICINE & REHABILITATION

## 2023-04-19 PROCEDURE — 99214 OFFICE O/P EST MOD 30 MIN: CPT | Mod: 95,,, | Performed by: PHYSICAL MEDICINE & REHABILITATION

## 2023-04-19 PROCEDURE — 99214 PR OFFICE/OUTPT VISIT, EST, LEVL IV, 30-39 MIN: ICD-10-PCS | Mod: 95,,, | Performed by: PHYSICAL MEDICINE & REHABILITATION

## 2023-04-19 PROCEDURE — 1159F MED LIST DOCD IN RCRD: CPT | Mod: CPTII,95,, | Performed by: PHYSICAL MEDICINE & REHABILITATION

## 2023-04-19 PROCEDURE — 1160F PR REVIEW ALL MEDS BY PRESCRIBER/CLIN PHARMACIST DOCUMENTED: ICD-10-PCS | Mod: CPTII,95,, | Performed by: PHYSICAL MEDICINE & REHABILITATION

## 2023-04-19 RX ORDER — PREGABALIN 150 MG/1
150 CAPSULE ORAL 2 TIMES DAILY
Qty: 60 CAPSULE | Refills: 2 | Status: SHIPPED | OUTPATIENT
Start: 2023-04-19 | End: 2023-09-20

## 2023-04-19 RX ORDER — NAPROXEN 375 MG/1
375 TABLET ORAL 2 TIMES DAILY WITH MEALS
Qty: 60 TABLET | Refills: 2 | Status: SHIPPED | OUTPATIENT
Start: 2023-04-19 | End: 2023-09-20 | Stop reason: SDUPTHER

## 2023-04-19 RX ORDER — TIZANIDINE 4 MG/1
TABLET ORAL
Qty: 60 TABLET | Refills: 1 | Status: SHIPPED | OUTPATIENT
Start: 2023-04-19 | End: 2023-09-05

## 2023-04-21 LAB — BACTERIA SPEC AEROBE CULT: NORMAL

## 2023-04-24 ENCOUNTER — OFFICE VISIT (OUTPATIENT)
Dept: OTOLARYNGOLOGY | Facility: CLINIC | Age: 43
End: 2023-04-24
Payer: COMMERCIAL

## 2023-04-24 VITALS — TEMPERATURE: 98 F | HEIGHT: 72 IN | BODY MASS INDEX: 39.68 KG/M2 | WEIGHT: 293 LBS

## 2023-04-24 DIAGNOSIS — H60.01 ABSCESS OF RIGHT EAR CANAL: Primary | ICD-10-CM

## 2023-04-24 PROCEDURE — 99024 POSTOP FOLLOW-UP VISIT: CPT | Mod: S$GLB,,, | Performed by: STUDENT IN AN ORGANIZED HEALTH CARE EDUCATION/TRAINING PROGRAM

## 2023-04-24 PROCEDURE — 99999 PR PBB SHADOW E&M-EST. PATIENT-LVL III: CPT | Mod: PBBFAC,,, | Performed by: STUDENT IN AN ORGANIZED HEALTH CARE EDUCATION/TRAINING PROGRAM

## 2023-04-24 PROCEDURE — 99024 PR POST-OP FOLLOW-UP VISIT: ICD-10-PCS | Mod: S$GLB,,, | Performed by: STUDENT IN AN ORGANIZED HEALTH CARE EDUCATION/TRAINING PROGRAM

## 2023-04-24 PROCEDURE — 1159F MED LIST DOCD IN RCRD: CPT | Mod: CPTII,S$GLB,, | Performed by: STUDENT IN AN ORGANIZED HEALTH CARE EDUCATION/TRAINING PROGRAM

## 2023-04-24 PROCEDURE — 99999 PR PBB SHADOW E&M-EST. PATIENT-LVL III: ICD-10-PCS | Mod: PBBFAC,,, | Performed by: STUDENT IN AN ORGANIZED HEALTH CARE EDUCATION/TRAINING PROGRAM

## 2023-04-24 PROCEDURE — 3008F PR BODY MASS INDEX (BMI) DOCUMENTED: ICD-10-PCS | Mod: CPTII,S$GLB,, | Performed by: STUDENT IN AN ORGANIZED HEALTH CARE EDUCATION/TRAINING PROGRAM

## 2023-04-24 PROCEDURE — 1159F PR MEDICATION LIST DOCUMENTED IN MEDICAL RECORD: ICD-10-PCS | Mod: CPTII,S$GLB,, | Performed by: STUDENT IN AN ORGANIZED HEALTH CARE EDUCATION/TRAINING PROGRAM

## 2023-04-24 PROCEDURE — 3008F BODY MASS INDEX DOCD: CPT | Mod: CPTII,S$GLB,, | Performed by: STUDENT IN AN ORGANIZED HEALTH CARE EDUCATION/TRAINING PROGRAM

## 2023-04-24 NOTE — PROGRESS NOTES
Chief complaint:   Chief Complaint   Patient presents with    Follow-up     2 week follow up. Abscess R ear.          Referring Provider:  No referring provider defined for this encounter.    History of Present Illness:     Ms. Burdick is a 42 y.o. female presenting for evaluation of right ear pain and swelling for the last 4 days.   Additional symptoms that also have been associated are muffled hearing, mass of ear canal, drainage. The patient has tried ciprodex with some relief.  The patient denies hearing loss, vertigo.      Does have itchy ears all the time.     The patient denies significant hearing loss risk factors, ototoxic medication exposure, chronic vestibular suppressant use, head/ facial/ merry trauma, and otologic surgery.      Return clinic visit, 4/24/23  Pain resolved  No drainage  Hearing stable  Itching AU  Completed abx and mupirocin    History        Past Medical History:   Past Medical History:   Diagnosis Date    Arthritis of knee 9/30/2020    Encounter for blood transfusion     IBS (irritable bowel syndrome)     Liver disease     NALFD    Obesity     Pancreatitis     .          Past Surgical History:  Past Surgical History:   Procedure Laterality Date    APPENDECTOMY      BILE DUCT EXPLORATION      CHOLECYSTECTOMY      INJECTION OF JOINT Bilateral 9/30/2020    Procedure: Bilateral intraarticular knee injection with local;  Surgeon: Ozzy Sofia MD;  Location: [facility];  Service: Pain Management;  Laterality: Bilateral;   .         Medications: Medication list was reviewed. She  has a current medication list which includes the following prescription(s): acetaminophen, ciprofloxacin-dexamethasone 0.3-0.1%, erythromycin, escitalopram oxalate, fluconazole, fluocinolone acetonide oil, lisdexamfetamine, [START ON 5/5/2023] lisdexamfetamine, mometasone 0.1%, mupirocin, naproxen, nystatin-triamcinolone, pregabalin, sulfamethoxazole-trimethoprim 800-160mg, tizanidine, diclofenac sodium,  doxepin, and [DISCONTINUED] sertraline.         Allergies:   Review of patient's allergies indicates:   Allergen Reactions    Penicillins Hives            Family history: family history includes Atrial fibrillation in her mother; Cancer in her maternal grandfather; Diabetes in her brother, father, and sister; Hypertension in her brother, father, and sister.         Social History          Alcohol use:  reports no history of alcohol use.            Tobacco:  reports that she quit smoking about 12 months ago. Her smoking use included cigarettes. She has never used smokeless tobacco.         Please see the patient's intake form for full details of past medical history, past surgical history, family history, social history and review of systems. ?This information was reviewed by me and noted.      Physical Examination     General: Well developed, well nourished, well hydrated. Verbal with a strong voice and not dysphonic.     Head/Face: Normocephalic, atraumatic. No scars or lesions. Facial musculature equal.     Eyes: No scleral icterus or conjunctival hemorrhage. EOMI. PERRLA.     Ears:     Right ear: No gross deformity. Mild residual fullness of right lateral EAC, non tender, no erythema, no drainage. The TM is intact with a pneumatized middle ear. No signs of retraction, fluid or infection.      Left ear: No gross deformity. EAC is clear of debris and erythema. The TM is intact with a pneumatized middle ear. No signs of retraction, fluid or infection.     Hearing: grossly intact    Nose: No gross deformity or lesions. No purulent discharge. No significant NSD.      Mouth/Oropharynx: Lips without any lesions. No mucosal lesions within the oropharynx. No tonsillar exudate or lesions. Pharyngeal walls symmetrical. Uvula midline. Tongue midline without lesions.     Neck: Trachea midline. No masses. No thyromegaly or nodules palpated.     Lymphatic: No lymphadenopathy in the neck.     Extremities: No cyanosis. Warm and  well-perfused.     Skin: No scars or lesions on face or neck.      Neurologic: Moving all extremities without gross abnormality.CN II-XII grossly intact. House-Brackmann 1/6. No signs of nystagmus.      Psych: Alert and oriented to person, place, and time with an appropriate mood and affect.     Data review:    Review of records:      I reviewed records from the referring provider's office visits.  These describe the history, workup, and/or treatment of this problem thus far.    Micro  Aerobic Bacterial Culture Skin gordy,  no predominant organism           Assessment/Plan:      1. Abscess of right ear canal      S/p I&D now resolved     Resolved  Ok to restart dermotic   Return to clinic as needed      Kar Rodriguez MD  [facility]   [facility] [address].  [place] [id number]  P: [phone]  F: [phone]

## 2023-05-03 ENCOUNTER — PATIENT MESSAGE (OUTPATIENT)
Dept: PAIN MEDICINE | Facility: CLINIC | Age: 43
End: 2023-05-03
Payer: COMMERCIAL

## 2023-05-05 ENCOUNTER — OFFICE VISIT (OUTPATIENT)
Dept: PSYCHIATRY | Facility: CLINIC | Age: 43
End: 2023-05-05
Payer: COMMERCIAL

## 2023-05-05 DIAGNOSIS — F90.2 ATTENTION DEFICIT HYPERACTIVITY DISORDER (ADHD), COMBINED TYPE: Primary | ICD-10-CM

## 2023-05-05 DIAGNOSIS — G47.00 INSOMNIA, UNSPECIFIED TYPE: ICD-10-CM

## 2023-05-05 DIAGNOSIS — F41.1 GENERALIZED ANXIETY DISORDER: ICD-10-CM

## 2023-05-05 DIAGNOSIS — F33.0 MILD EPISODE OF RECURRENT MAJOR DEPRESSIVE DISORDER: ICD-10-CM

## 2023-05-05 PROCEDURE — 90832 PSYTX W PT 30 MINUTES: CPT | Mod: 95,,, | Performed by: PSYCHOLOGIST

## 2023-05-05 PROCEDURE — 1159F MED LIST DOCD IN RCRD: CPT | Mod: CPTII,95,, | Performed by: PSYCHOLOGIST

## 2023-05-05 PROCEDURE — 90863 PR PHARMACOLOGIC MANAGEMENT: ICD-10-PCS | Mod: 95,,, | Performed by: PSYCHOLOGIST

## 2023-05-05 PROCEDURE — 90832 PR PSYCHOTHERAPY W/PATIENT, 30 MIN: ICD-10-PCS | Mod: 95,,, | Performed by: PSYCHOLOGIST

## 2023-05-05 PROCEDURE — 1159F PR MEDICATION LIST DOCUMENTED IN MEDICAL RECORD: ICD-10-PCS | Mod: CPTII,95,, | Performed by: PSYCHOLOGIST

## 2023-05-05 PROCEDURE — 90863 PHARMACOLOGIC MGMT W/PSYTX: CPT | Mod: 95,,, | Performed by: PSYCHOLOGIST

## 2023-05-05 RX ORDER — LISDEXAMFETAMINE DIMESYLATE 60 MG/1
60 CAPSULE ORAL EVERY MORNING
Qty: 30 CAPSULE | Refills: 0 | Status: SHIPPED | OUTPATIENT
Start: 2023-06-04 | End: 2023-07-04

## 2023-05-05 RX ORDER — LISDEXAMFETAMINE DIMESYLATE 60 MG/1
60 CAPSULE ORAL EVERY MORNING
Qty: 30 CAPSULE | Refills: 0 | Status: SHIPPED | OUTPATIENT
Start: 2023-05-05 | End: 2023-06-04

## 2023-05-05 RX ORDER — LISDEXAMFETAMINE DIMESYLATE 60 MG/1
60 CAPSULE ORAL EVERY MORNING
Qty: 30 CAPSULE | Refills: 0 | Status: SHIPPED | OUTPATIENT
Start: 2023-07-04 | End: 2023-09-28

## 2023-05-05 RX ORDER — ESCITALOPRAM OXALATE 10 MG/1
15 TABLET ORAL DAILY
Qty: 135 TABLET | Refills: 1 | Status: SHIPPED | OUTPATIENT
Start: 2023-05-05 | End: 2024-01-31 | Stop reason: SDUPTHER

## 2023-05-05 NOTE — PROGRESS NOTES
Outpatient Psychiatry Follow-Up Visit    5/5/2023    Timeframe: Corona Virus Outbreak     The patient location is: Patient's home/ Patient reported that his/her location at the time of this visit was in the [facility]     Visit type: Virtual visit with synchronous audio and video     Each patient to whom he or she provides medical services by telehealth is: (1) informed of the relationship between the medical psychologist and patient and the respective role of any other health care provider with respect to management of the patient; and (2) notified that he or she may decline to receive medical services by telehealth and may withdraw from such care at any time.    I also informed patient of the following:   Chasidy Anthony, PhD, MPAP:  [place] medical license number: [id number]    My contact info:  [facility] / 2nd Floor  [address] [place] [address]   Ph: [phone]    If technology issues, call office phone: Ph: [phone]  If crisis: Dial 911 or go to nearest Emergency Room (ER)  If questions related to privacy practices: contact [facility] Information Department: [phone]    Chief Complaint:  Brook Burdick is a 42 y.o. female who presents today for follow-up of depression and anxiety.       Impressions/Plan from last visit: Allegra attended her virtual visit. She got a job working with [employer] and is working from home. She decided to stay in [place]. She is still in training with the job--does like it. 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 She said that she had fluid on her knee and saw her PCP--started flexeril and tramadol for the knee--has not been taking doxepin. Her knee is better, and she thinks that she will be stopping those medicines soon. She has not slept as well without the doxepin. She is not having crying spells--feels more like herself. She is struggling with allergies  with the pollen. She is still taking Lexapro and Vyvanse, though she does not take Vyvanse daily. She takes it when she has paperwork, etc. When asked about self-care, she is getting back into gardening now that it's springtime; she plays with her puppy outside (he likes her attention). She does a lot of arts and crafts. She also enjoys the kids that she sees--she gets a lot of twila with seeing them. She also sees her sisters and niece and nephew. Her nephew is about to go to college--they are close. He will be going to [employer]. 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 She is with [employer]--she will be there for this one and the next one. She and her boyfriend broke up in December--she is in a better place with it now. She is busy with school and work and is not interested in dating right now. She does not have any side effects that she can tell--has had some weight fluctuation. We discussed trying Lexapro 10 mg--she may try that.     Plan--continue Lexapro 15 mg; doxepin 10 mg hs prn; Vyvanse 60 mg (sent 3 scripts)      Interval History and Content of Current Session: Allegra attended her virtual visit. She is doing better--she is sleeping better. She has not been taking doxepin because she had been taking flexeril and tramadol for a torn miniscus. She has not taken doxepin in over a month. 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 She finishes classes in August; then plans to get a post-masters in forensic psychology certification (will finish that in May of next year); thinking about going back to get her doctorate in clinical psychology with a focus in forensics. She has a lot of test anxiety--has a big test in October. She works for [employer] from home. She is still working at [employer]--has anxiety about that sometimes. We may consider using Xanax or propranolol for her test anxiety and will further discuss at our next visit.  She does not regularly take Vyvanse. She has a great attitude post-breakup. She is still friendly with her ex's family. She is focused on finishing school. At this time, we agreed to continue Lexapro and Vyvanse--she already has doxepin if needed.    Plan--continue Lexapro 10 mg;  Vyvanse 60 mg (sent 3 scripts); doxepin 10 mg hs prn (rarely takes)     since December 2022.        PSYCHOTHERAPY      Site: [facility]  Time: 16 minutes  Participants: Met with patient    Therapeutic Intervention Type: behavior modifying psychotherapy, supportive psychotherapy  Why chosen therapy is appropriate versus another modality: patient responds to this modality, evidence based practice    Target symptoms: depression, distractability, anxiety , school/work stress  Primary focus: see above    Outcome monitoring methods: self-report, observation    Patient's response to intervention:  The patient's response to intervention is accepting.    Progress toward goals:  The patient's progress toward goals is good.    GAD7 5/2/2023 3/31/2023 3/31/2023   1. Feeling nervous, anxious, or on edge? 1 1 1   2. Not being able to stop or control worrying? 1 1 1   3. Worrying too much about different things? 0 0 0   4. Trouble relaxing? 1 0 0   5. Being so restless that it is hard to sit still? 0 0 0   6. Becoming easily annoyed or irritable? 0 0 0   7. Feeling afraid as if something awful might happen? 0 0 0   8. If you checked off any problems, how difficult have these problems made it for you to do your work, take care of things at home, or get along with other people? - - -   KATHLEEN-7 Score 3 2 2      0-4 = Minimal anxiety  5-9 = Mild anxiety  10-14 = Moderate anxiety  15-21 = Severe anxiety       Review of Systems   PSYCHIATRIC: Pertinant items are noted in the narrative.    Past Medical, Family and Social History: The patient's past medical, family and social history have been reviewed and updated as appropriate within the  electronic medical record - see encounter notes.      Current Outpatient Medications:     acetaminophen (TYLENOL) 650 MG TbSR, Take 650 mg by mouth 2 (two) times a day., Disp: , Rfl:     diclofenac sodium (VOLTAREN) 1 % Gel, Apply 2 g topically 4 (four) times daily as needed (Pain)., Disp: 100 g, Rfl: 2    doxepin (SINEQUAN) 10 MG capsule, Take 1 capsule (10 mg total) by mouth every evening., Disp: 30 capsule, Rfl: 2    erythromycin (ROMYCIN) ophthalmic ointment, Place into the left eye 2 (two) times a day., Disp: 1 each, Rfl: 3    EScitalopram oxalate (LEXAPRO) 10 MG tablet, Take 1.5 tablets (15 mg total) by mouth once daily., Disp: 135 tablet, Rfl: 1    fluocinolone acetonide oiL 0.01 % Drop, Place into both ears 2 (two) times daily., Disp: , Rfl:     lisdexamfetamine (VYVANSE) 60 MG capsule, Take 1 capsule (60 mg total) by mouth every morning., Disp: 30 capsule, Rfl: 0    [START ON 6/4/2023] lisdexamfetamine (VYVANSE) 60 MG capsule, Take 1 capsule (60 mg total) by mouth every morning., Disp: 30 capsule, Rfl: 0    [START ON 7/4/2023] lisdexamfetamine (VYVANSE) 60 MG capsule, Take 1 capsule (60 mg total) by mouth every morning., Disp: 30 capsule, Rfl: 0    naproxen (EC-NAPROSYN) 375 MG TbEC EC tablet, Take 1 tablet (375 mg total) by mouth 2 (two) times daily with meals., Disp: 60 tablet, Rfl: 2    pregabalin (LYRICA) 150 MG capsule, Take 1 capsule (150 mg total) by mouth 2 (two) times daily., Disp: 60 capsule, Rfl: 2    tiZANidine (ZANAFLEX) 4 MG tablet, TAKE 1/2 TO 1 TABLET BY MOUTH TWICE DAILY AS NEEDED FOR MUSCLE SPASMS. MAY CAUSE DROWSINESS, Disp: 60 tablet, Rfl: 1    Compliance: yes    Side effects: see above    Risk Parameters:  Patient reports no suicidal ideation  Patient reports no homicidal ideation  Patient reports no self-injurious behavior  Patient reports no violent behavior    Exam (detailed: at least 9 elements; comprehensive: all 15 elements)   Constitutional  Vitals:  Most recent vital signs were  reviewed.   Last 3 sets of Vitals    Vitals - 1 value per visit 4/18/2023 4/24/2023 4/24/2023   SYSTOLIC - - -   DIASTOLIC - - -   Pulse - - -   Temp 98.7 - 97.7   Resp - - -   SPO2 - - -   Weight (lb) 254.85 - 349.21   Weight (kg) 115.6 - 158.4   Height 72 - 72   BMI (Calculated) 34.6 - 47.4   VISIT REPORT - - -   Pain Score  - 0 -   Some recent data might be hidden          General:  age appropriate, casually dressed, neatly groomed, wearing glasses, hair back with scarf     Musculoskeletal  Muscle Strength/Tone:  no tremor, no tic   Gait & Station:  video visit     Psychiatric  Speech:  no latency; no press   Behavior: wnl   Mood & Affect:  anxious, at times  congruent and appropriate   Thought Process:  normal and logical   Associations:  intact   Thought Content:  normal, no suicidality, no homicidality, delusions, or paranoia   Insight:  has awareness of illness   Judgement: behavior is adequate to circumstances   Orientation:  grossly intact   Memory: intact for content of interview   Language: grossly intact   Attention Span & Concentration:  Grossly intact   Fund of Knowledge:  intact and appropriate to age and level of education     Assessment and Diagnosis   Status/Progress: Based on the examination today, the patient's problem(s) is/are improved.  New problems have been presented today.   Co-morbidities and psychosocial stressors  are complicating management of the primary condition.  There are no active rule-out diagnoses for this patient at this time.     General Impression:     Encounter Diagnoses   Name Primary?    Attention deficit hyperactivity disorder (ADHD), combined type Yes    Insomnia, unspecified type     Generalized anxiety disorder     Mild episode of recurrent major depressive disorder        Intervention/Counseling/Treatment Plan   Medication Management: Discussed risks, benefits, and alternatives to treatment plan documented above with patient. I answered all patient questions related  to this plan, and patient expressed understanding and agreement.   continue Lexapro 10 mg;  Vyvanse 60 mg (sent 3 scripts); doxepin 10 mg hs prn (rarely takes)  Consider counseling    Healthy eating/exercise    Medication List with Changes/Refills   New Medications    LISDEXAMFETAMINE (VYVANSE) 60 MG CAPSULE    Take 1 capsule (60 mg total) by mouth every morning.    LISDEXAMFETAMINE (VYVANSE) 60 MG CAPSULE    Take 1 capsule (60 mg total) by mouth every morning.    LISDEXAMFETAMINE (VYVANSE) 60 MG CAPSULE    Take 1 capsule (60 mg total) by mouth every morning.   Current Medications    ACETAMINOPHEN (TYLENOL) 650 MG TBSR    Take 650 mg by mouth 2 (two) times a day.    DICLOFENAC SODIUM (VOLTAREN) 1 % GEL    Apply 2 g topically 4 (four) times daily as needed (Pain).    DOXEPIN (SINEQUAN) 10 MG CAPSULE    Take 1 capsule (10 mg total) by mouth every evening.    ERYTHROMYCIN (ROMYCIN) OPHTHALMIC OINTMENT    Place into the left eye 2 (two) times a day.    FLUOCINOLONE ACETONIDE OIL 0.01 % DROP    Place into both ears 2 (two) times daily.    NAPROXEN (EC-NAPROSYN) 375 MG TBEC EC TABLET    Take 1 tablet (375 mg total) by mouth 2 (two) times daily with meals.    PREGABALIN (LYRICA) 150 MG CAPSULE    Take 1 capsule (150 mg total) by mouth 2 (two) times daily.    TIZANIDINE (ZANAFLEX) 4 MG TABLET    TAKE 1/2 TO 1 TABLET BY MOUTH TWICE DAILY AS NEEDED FOR MUSCLE SPASMS. MAY CAUSE DROWSINESS   Changed and/or Refilled Medications    Modified Medication Previous Medication    ESCITALOPRAM OXALATE (LEXAPRO) 10 MG TABLET EScitalopram oxalate (LEXAPRO) 10 MG tablet       Take 1.5 tablets (15 mg total) by mouth once daily.    Take 1.5 tablets (15 mg total) by mouth once daily.   Discontinued Medications    FLUCONAZOLE (DIFLUCAN) 150 MG TAB    Take 150 mg by mouth every 7 days.    LISDEXAMFETAMINE (VYVANSE) 60 MG CAPSULE    Take 1 capsule (60 mg total) by mouth every morning.    LISDEXAMFETAMINE (VYVANSE) 60 MG CAPSULE    Take 1 capsule  (60 mg total) by mouth every morning.    MOMETASONE 0.1% (ELOCON) 0.1 % CREAM    Apply topically 2 (two) times daily.    NYSTATIN-TRIAMCINOLONE (MYCOLOG II) CREAM        SULFAMETHOXAZOLE-TRIMETHOPRIM 800-160MG (BACTRIM DS) 800-160 MG TAB    Take 1 tablet by mouth 2 (two) times daily. for 7 days        Return to Clinic: 3 months    I spent an additional 11 minutes performing E/M services with >50% spent on counseling, guidance, coordinating care (not Psychotherapy related) in addition to the 16 minutes performing Psychotherapy.    Time spent with pt including note preparation: 27 minutes       Chasidy Anthony, PhD, MP  Advanced Practice Medical Psychologist  [facility]  [address] [facility]  [place] [id number]  [phone]   [phone] fax

## 2023-05-08 ENCOUNTER — PATIENT MESSAGE (OUTPATIENT)
Dept: PSYCHIATRY | Facility: CLINIC | Age: 43
End: 2023-05-08
Payer: COMMERCIAL

## 2023-05-09 RX ORDER — ALPRAZOLAM 0.25 MG/1
.25-.5 TABLET ORAL
Qty: 10 TABLET | Refills: 0 | Status: SHIPPED | OUTPATIENT
Start: 2023-05-09 | End: 2023-11-27

## 2023-06-01 ENCOUNTER — TELEPHONE (OUTPATIENT)
Dept: PAIN MEDICINE | Facility: CLINIC | Age: 43
End: 2023-06-01
Payer: COMMERCIAL

## 2023-06-01 ENCOUNTER — PATIENT MESSAGE (OUTPATIENT)
Dept: PSYCHIATRY | Facility: CLINIC | Age: 43
End: 2023-06-01

## 2023-07-07 ENCOUNTER — PATIENT MESSAGE (OUTPATIENT)
Dept: INFECTIOUS DISEASES | Facility: CLINIC | Age: 43
End: 2023-07-07
Payer: COMMERCIAL

## 2023-07-20 ENCOUNTER — PATIENT MESSAGE (OUTPATIENT)
Dept: PSYCHIATRY | Facility: CLINIC | Age: 43
End: 2023-07-20

## 2023-07-24 ENCOUNTER — TELEPHONE (OUTPATIENT)
Dept: PAIN MEDICINE | Facility: CLINIC | Age: 43
End: 2023-07-24
Payer: COMMERCIAL

## 2023-09-05 DIAGNOSIS — M54.16 LUMBAR RADICULOPATHY: ICD-10-CM

## 2023-09-05 DIAGNOSIS — M25.562 CHRONIC PAIN OF LEFT KNEE: ICD-10-CM

## 2023-09-05 DIAGNOSIS — M25.561 CHRONIC PAIN OF BOTH KNEES: ICD-10-CM

## 2023-09-05 DIAGNOSIS — M25.562 CHRONIC PAIN OF BOTH KNEES: ICD-10-CM

## 2023-09-05 DIAGNOSIS — M47.816 LUMBAR SPONDYLOSIS: ICD-10-CM

## 2023-09-05 DIAGNOSIS — M54.12 CERVICAL RADICULOPATHY: ICD-10-CM

## 2023-09-05 DIAGNOSIS — G89.29 CHRONIC MYOFASCIAL PAIN: ICD-10-CM

## 2023-09-05 DIAGNOSIS — M79.18 CHRONIC MYOFASCIAL PAIN: ICD-10-CM

## 2023-09-05 DIAGNOSIS — E66.01 MORBID OBESITY WITH BMI OF 40.0-44.9, ADULT: ICD-10-CM

## 2023-09-05 DIAGNOSIS — M79.12 MYALGIA OF AUXILIARY MUSCLES, HEAD AND NECK: ICD-10-CM

## 2023-09-05 DIAGNOSIS — G89.29 CHRONIC PAIN OF BOTH KNEES: ICD-10-CM

## 2023-09-05 DIAGNOSIS — G89.29 CHRONIC PAIN OF LEFT KNEE: ICD-10-CM

## 2023-09-05 RX ORDER — TIZANIDINE 4 MG/1
TABLET ORAL
Qty: 60 TABLET | Refills: 1 | Status: SHIPPED | OUTPATIENT
Start: 2023-09-05 | End: 2023-09-20 | Stop reason: SDUPTHER

## 2023-09-20 ENCOUNTER — OFFICE VISIT (OUTPATIENT)
Dept: PAIN MEDICINE | Facility: CLINIC | Age: 43
End: 2023-09-20
Payer: COMMERCIAL

## 2023-09-20 ENCOUNTER — PATIENT MESSAGE (OUTPATIENT)
Dept: PAIN MEDICINE | Facility: CLINIC | Age: 43
End: 2023-09-20

## 2023-09-20 VITALS
WEIGHT: 293 LBS | BODY MASS INDEX: 39.68 KG/M2 | DIASTOLIC BLOOD PRESSURE: 93 MMHG | SYSTOLIC BLOOD PRESSURE: 166 MMHG | HEART RATE: 76 BPM | HEIGHT: 72 IN

## 2023-09-20 DIAGNOSIS — G89.29 CHRONIC PAIN OF BOTH KNEES: ICD-10-CM

## 2023-09-20 DIAGNOSIS — M25.562 CHRONIC PAIN OF LEFT KNEE: ICD-10-CM

## 2023-09-20 DIAGNOSIS — M79.18 CHRONIC MYOFASCIAL PAIN: ICD-10-CM

## 2023-09-20 DIAGNOSIS — M54.12 CERVICAL RADICULOPATHY: ICD-10-CM

## 2023-09-20 DIAGNOSIS — E66.01 MORBID OBESITY WITH BMI OF 40.0-44.9, ADULT: ICD-10-CM

## 2023-09-20 DIAGNOSIS — M47.816 LUMBAR SPONDYLOSIS: ICD-10-CM

## 2023-09-20 DIAGNOSIS — M25.561 CHRONIC PAIN OF BOTH KNEES: ICD-10-CM

## 2023-09-20 DIAGNOSIS — M25.562 CHRONIC PAIN OF BOTH KNEES: ICD-10-CM

## 2023-09-20 DIAGNOSIS — M79.12 MYALGIA OF AUXILIARY MUSCLES, HEAD AND NECK: ICD-10-CM

## 2023-09-20 DIAGNOSIS — G89.29 CHRONIC PAIN OF LEFT KNEE: ICD-10-CM

## 2023-09-20 DIAGNOSIS — G89.29 CHRONIC MYOFASCIAL PAIN: ICD-10-CM

## 2023-09-20 DIAGNOSIS — M54.16 LUMBAR RADICULOPATHY: ICD-10-CM

## 2023-09-20 PROCEDURE — 1159F PR MEDICATION LIST DOCUMENTED IN MEDICAL RECORD: ICD-10-PCS | Mod: CPTII,S$GLB,, | Performed by: PHYSICAL MEDICINE & REHABILITATION

## 2023-09-20 PROCEDURE — 3080F PR MOST RECENT DIASTOLIC BLOOD PRESSURE >= 90 MM HG: ICD-10-PCS | Mod: CPTII,S$GLB,, | Performed by: PHYSICAL MEDICINE & REHABILITATION

## 2023-09-20 PROCEDURE — 1159F MED LIST DOCD IN RCRD: CPT | Mod: CPTII,S$GLB,, | Performed by: PHYSICAL MEDICINE & REHABILITATION

## 2023-09-20 PROCEDURE — 3008F PR BODY MASS INDEX (BMI) DOCUMENTED: ICD-10-PCS | Mod: CPTII,S$GLB,, | Performed by: PHYSICAL MEDICINE & REHABILITATION

## 2023-09-20 PROCEDURE — 99214 OFFICE O/P EST MOD 30 MIN: CPT | Mod: S$GLB,,, | Performed by: PHYSICAL MEDICINE & REHABILITATION

## 2023-09-20 PROCEDURE — 99214 PR OFFICE/OUTPT VISIT, EST, LEVL IV, 30-39 MIN: ICD-10-PCS | Mod: S$GLB,,, | Performed by: PHYSICAL MEDICINE & REHABILITATION

## 2023-09-20 PROCEDURE — 3077F PR MOST RECENT SYSTOLIC BLOOD PRESSURE >= 140 MM HG: ICD-10-PCS | Mod: CPTII,S$GLB,, | Performed by: PHYSICAL MEDICINE & REHABILITATION

## 2023-09-20 PROCEDURE — 99999 PR PBB SHADOW E&M-EST. PATIENT-LVL III: CPT | Mod: PBBFAC,,, | Performed by: PHYSICAL MEDICINE & REHABILITATION

## 2023-09-20 PROCEDURE — 3077F SYST BP >= 140 MM HG: CPT | Mod: CPTII,S$GLB,, | Performed by: PHYSICAL MEDICINE & REHABILITATION

## 2023-09-20 PROCEDURE — 3008F BODY MASS INDEX DOCD: CPT | Mod: CPTII,S$GLB,, | Performed by: PHYSICAL MEDICINE & REHABILITATION

## 2023-09-20 PROCEDURE — 3080F DIAST BP >= 90 MM HG: CPT | Mod: CPTII,S$GLB,, | Performed by: PHYSICAL MEDICINE & REHABILITATION

## 2023-09-20 PROCEDURE — 99999 PR PBB SHADOW E&M-EST. PATIENT-LVL III: ICD-10-PCS | Mod: PBBFAC,,, | Performed by: PHYSICAL MEDICINE & REHABILITATION

## 2023-09-20 RX ORDER — NAPROXEN 375 MG/1
375 TABLET ORAL 2 TIMES DAILY WITH MEALS
Qty: 60 TABLET | Refills: 2 | Status: SHIPPED | OUTPATIENT
Start: 2023-09-20 | End: 2023-12-11 | Stop reason: SDUPTHER

## 2023-09-20 RX ORDER — PREGABALIN 150 MG/1
150 CAPSULE ORAL NIGHTLY
Qty: 90 CAPSULE | Refills: 3 | Status: SHIPPED | OUTPATIENT
Start: 2023-09-20 | End: 2023-12-11 | Stop reason: SDUPTHER

## 2023-09-20 RX ORDER — TIZANIDINE 4 MG/1
TABLET ORAL
Qty: 60 TABLET | Refills: 1 | Status: SHIPPED | OUTPATIENT
Start: 2023-09-20 | End: 2023-12-11 | Stop reason: SDUPTHER

## 2023-09-20 RX ORDER — TRAZODONE HYDROCHLORIDE 50 MG/1
TABLET ORAL
COMMUNITY
Start: 2023-07-29 | End: 2023-09-20

## 2023-09-20 NOTE — PROGRESS NOTES
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 evening.  Dispense: 90 capsule; Refill: 3  -     naproxen (EC-NAPROSYN) 375 MG TbEC EC tablet; Take 1 tablet (375 mg total) by mouth 2 (two) times daily with meals.  Dispense: 60 tablet; Refill: 2    Chronic pain of both knees  -     tiZANidine (ZANAFLEX) 4 MG tablet; TAKE 1/2 TO 1 TABLET BY MOUTH NIGHTLY AS NEEDED FOR MUSCLE SPASMS. MAY CAUSE DROWSINESS  Dispense: 60 tablet; Refill: 1  -     pregabalin (LYRICA) 150 MG capsule; Take 1 capsule (150 mg total) by mouth every evening.  Dispense: 90 capsule; Refill: 3  -     naproxen (EC-NAPROSYN) 375 MG TbEC EC tablet; Take 1 tablet (375 mg total) by mouth 2 (two) times daily with meals.  Dispense: 60 tablet; Refill: 2    Lumbar spondylosis  -     tiZANidine (ZANAFLEX) 4 MG tablet; TAKE 1/2 TO 1 TABLET BY MOUTH NIGHTLY AS NEEDED FOR MUSCLE SPASMS. MAY CAUSE DROWSINESS  Dispense: 60 tablet; Refill: 1  -     pregabalin (LYRICA) 150 MG capsule; Take 1 capsule (150 mg total) by mouth every evening.  Dispense: 90 capsule; Refill: 3  -     naproxen (EC-NAPROSYN) 375 MG TbEC EC tablet; Take 1 tablet (375 mg total) by mouth 2 (two) times daily with meals.  Dispense: 60 tablet; Refill: 2    Cervical radiculopathy  -     tiZANidine (ZANAFLEX) 4 MG tablet; TAKE 1/2 TO 1 TABLET BY  MOUTH NIGHTLY AS NEEDED FOR MUSCLE SPASMS. MAY CAUSE DROWSINESS  Dispense: 60 tablet; Refill: 1  -     pregabalin (LYRICA) 150 MG capsule; Take 1 capsule (150 mg total) by mouth every evening.  Dispense: 90 capsule; Refill: 3  -     naproxen (EC-NAPROSYN) 375 MG TbEC EC tablet; Take 1 tablet (375 mg total) by mouth 2 (two) times daily with meals.  Dispense: 60 tablet; Refill: 2    Lumbar radiculopathy  -     tiZANidine (ZANAFLEX) 4 MG tablet; TAKE 1/2 TO 1 TABLET BY MOUTH NIGHTLY AS NEEDED FOR MUSCLE SPASMS. MAY CAUSE DROWSINESS  Dispense: 60 tablet; Refill: 1  -     pregabalin (LYRICA) 150 MG capsule; Take 1 capsule (150 mg total) by mouth every evening.  Dispense: 90 capsule; Refill: 3  -     naproxen (EC-NAPROSYN) 375 MG TbEC EC tablet; Take 1 tablet (375 mg total) by mouth 2 (two) times daily with meals.  Dispense: 60 tablet; Refill: 2                PLAN:   - Interventional:  None at this time, could consider performing bilateral sacroiliac joint injections in the future if warranted     - Pharmacologic:   -Refill Lyrica to 150 mg nightly  -refill tizanidine 2-4 mg nightly as needed  - refill naproxen 375 mg b.i.d. p.r.n  -continue Voltaren gel 4 times a day as needed  -discontinues zonisamide  -continue Tylenol Extra Strength p.r.n.  - Anticoagulation use: None.          - Rehabilitative: Encouraged regular exercise. Continue exercises and activities as tolerated.  Advised patient continue with home exercises, specifically working on SI joint and quad strengthening     - Diagnostic:  Reviewed     - Follow up:  3 months or as needed     - This condition does not require this patient to take time off of work, and the primary goal of our Pain Management services is to improve the patient's functional capacity.      - I discussed the risks, benefits, and alternatives to potential treatment options. All questions and concerns were fully addressed today in clinic.     The above plan and management options were  discussed at length with patient. Patient is in agreement with the above and verbalized understanding.      Ricki Noriega MD  Interventional Pain Management  [facility]    Disclaimer:  This note was prepared using voice recognition system and is likely to have sound alike errors that may have been overlooked even after proof reading.  Please call me with any questions

## 2023-09-22 ENCOUNTER — OFFICE VISIT (OUTPATIENT)
Dept: INTERNAL MEDICINE | Facility: CLINIC | Age: 43
End: 2023-09-22
Payer: COMMERCIAL

## 2023-09-22 ENCOUNTER — HOSPITAL ENCOUNTER (OUTPATIENT)
Dept: RADIOLOGY | Facility: HOSPITAL | Age: 43
Discharge: HOME | End: 2023-09-22
Attending: FAMILY MEDICINE
Payer: COMMERCIAL

## 2023-09-22 VITALS
HEIGHT: 72 IN | WEIGHT: 293 LBS | OXYGEN SATURATION: 96 % | SYSTOLIC BLOOD PRESSURE: 116 MMHG | BODY MASS INDEX: 39.68 KG/M2 | DIASTOLIC BLOOD PRESSURE: 86 MMHG | HEART RATE: 78 BPM | TEMPERATURE: 97 F

## 2023-09-22 DIAGNOSIS — R73.03 PREDIABETES: ICD-10-CM

## 2023-09-22 DIAGNOSIS — Z87.01 HISTORY OF PNEUMONIA: ICD-10-CM

## 2023-09-22 DIAGNOSIS — E66.01 CLASS 3 SEVERE OBESITY DUE TO EXCESS CALORIES WITH SERIOUS COMORBIDITY AND BODY MASS INDEX (BMI) OF 45.0 TO 49.9 IN ADULT: ICD-10-CM

## 2023-09-22 DIAGNOSIS — R53.83 FATIGUE, UNSPECIFIED TYPE: Primary | ICD-10-CM

## 2023-09-22 DIAGNOSIS — R63.0 POOR APPETITE: ICD-10-CM

## 2023-09-22 DIAGNOSIS — J02.9 SORE THROAT: ICD-10-CM

## 2023-09-22 LAB — GROUP A STREP, MOLECULAR: NEGATIVE

## 2023-09-22 PROCEDURE — 71046 X-RAY EXAM CHEST 2 VIEWS: CPT | Mod: TC

## 2023-09-22 PROCEDURE — 71046 XR CHEST PA AND LATERAL: ICD-10-PCS | Mod: 26,,, | Performed by: RADIOLOGY

## 2023-09-22 PROCEDURE — 99999 PR PBB SHADOW E&M-EST. PATIENT-LVL IV: CPT | Mod: PBBFAC,,, | Performed by: FAMILY MEDICINE

## 2023-09-22 PROCEDURE — 3074F SYST BP LT 130 MM HG: CPT | Mod: CPTII,S$GLB,, | Performed by: FAMILY MEDICINE

## 2023-09-22 PROCEDURE — 3079F DIAST BP 80-89 MM HG: CPT | Mod: CPTII,S$GLB,, | Performed by: FAMILY MEDICINE

## 2023-09-22 PROCEDURE — 3008F PR BODY MASS INDEX (BMI) DOCUMENTED: ICD-10-PCS | Mod: CPTII,S$GLB,, | Performed by: FAMILY MEDICINE

## 2023-09-22 PROCEDURE — 3079F PR MOST RECENT DIASTOLIC BLOOD PRESSURE 80-89 MM HG: ICD-10-PCS | Mod: CPTII,S$GLB,, | Performed by: FAMILY MEDICINE

## 2023-09-22 PROCEDURE — 3074F PR MOST RECENT SYSTOLIC BLOOD PRESSURE < 130 MM HG: ICD-10-PCS | Mod: CPTII,S$GLB,, | Performed by: FAMILY MEDICINE

## 2023-09-22 PROCEDURE — 1159F MED LIST DOCD IN RCRD: CPT | Mod: CPTII,S$GLB,, | Performed by: FAMILY MEDICINE

## 2023-09-22 PROCEDURE — 99214 PR OFFICE/OUTPT VISIT, EST, LEVL IV, 30-39 MIN: ICD-10-PCS | Mod: S$GLB,,, | Performed by: FAMILY MEDICINE

## 2023-09-22 PROCEDURE — 71046 X-RAY EXAM CHEST 2 VIEWS: CPT | Mod: 26,,, | Performed by: RADIOLOGY

## 2023-09-22 PROCEDURE — 1159F PR MEDICATION LIST DOCUMENTED IN MEDICAL RECORD: ICD-10-PCS | Mod: CPTII,S$GLB,, | Performed by: FAMILY MEDICINE

## 2023-09-22 PROCEDURE — 87651 STREP A DNA AMP PROBE: CPT | Performed by: FAMILY MEDICINE

## 2023-09-22 PROCEDURE — 99999 PR PBB SHADOW E&M-EST. PATIENT-LVL IV: ICD-10-PCS | Mod: PBBFAC,,, | Performed by: FAMILY MEDICINE

## 2023-09-22 PROCEDURE — 3008F BODY MASS INDEX DOCD: CPT | Mod: CPTII,S$GLB,, | Performed by: FAMILY MEDICINE

## 2023-09-22 PROCEDURE — 99214 OFFICE O/P EST MOD 30 MIN: CPT | Mod: S$GLB,,, | Performed by: FAMILY MEDICINE

## 2023-09-22 NOTE — PROGRESS NOTES
Subjective:       Patient ID: Brook Burdick is a 43 y.o. female.    Chief Complaint: Fatigue (Started Saturday and getting worse Monday) and itchy throat (Started last week around thursday)    Fatigue  Associated symptoms include fatigue, headaches, neck pain and weakness. Pertinent negatives include no arthralgias, chest pain, joint swelling or vomiting.       Patient Active Problem List   Diagnosis    Chronic constipation    Family history of colonic polyps    Insulin resistance    Gastroesophageal reflux disease without esophagitis    Severe obesity (BMI >= 40)    Chronic headaches    Cough    Arthritis of knee    Neck pain    Right arm pain    Decreased ROM of intervertebral discs of cervical spine    Right arm weakness    Anxiety and depression    Bilateral carpal tunnel syndrome       Past Medical History:   Diagnosis Date    Arthritis of knee 2020    Encounter for blood transfusion     IBS (irritable bowel syndrome)     Liver disease     NALFD    Obesity     Pancreatitis        Past Surgical History:   Procedure Laterality Date    APPENDECTOMY      BILE DUCT EXPLORATION      CHOLECYSTECTOMY      INJECTION OF JOINT Bilateral 2020    Procedure: Bilateral intraarticular knee injection with local;  Surgeon: Ozzy Sofia MD;  Location: [facility] [place];  Service: Pain Management;  Laterality: Bilateral;       Family History   Problem Relation Age of Onset    Atrial fibrillation Mother     Diabetes Father     Hypertension Father     Diabetes Sister     Hypertension Sister     Hypertension Brother     Diabetes Brother     Cancer Maternal Grandfather        Social History     Tobacco Use   Smoking Status Former    Current packs/day: 0.00    Types: Cigarettes    Quit date: 2022    Years since quittin.4   Smokeless Tobacco Never       Wt Readings from Last 5 Encounters:   23 (!) 162.3 kg (357 lb 12.9 oz)   23 (!) 165.6 kg (365 lb 1.3 oz)   23 (!) 158.4 kg (349 lb 3.3 oz)    04/18/23 115.6 kg (254 lb 13.6 oz)   04/15/23 (!) 155.6 kg (343 lb)       For further HPI details, see assessment and plan.    Review of Systems   Constitutional:  Positive for fatigue and unexpected weight change. Negative for activity change.   HENT:  Positive for rhinorrhea. Negative for hearing loss and trouble swallowing.    Eyes:  Negative for discharge and visual disturbance.   Respiratory:  Negative for chest tightness and wheezing.    Cardiovascular:  Negative for chest pain and palpitations.   Gastrointestinal:  Negative for blood in stool, constipation, diarrhea and vomiting.   Endocrine: Positive for polydipsia and polyuria.   Genitourinary:  Negative for difficulty urinating, dysuria, hematuria and menstrual problem.   Musculoskeletal:  Positive for neck pain. Negative for arthralgias and joint swelling.   Neurological:  Positive for weakness and headaches.   Psychiatric/Behavioral:  Negative for confusion and dysphoric mood.        Objective:      Vitals:    09/22/23 1401   BP: 116/86   Pulse: 78   Temp: 97 °F (36.1 °C)       Physical Exam  Constitutional:       General: She is not in acute distress.     Appearance: She is not ill-appearing.   Pulmonary:      Effort: Pulmonary effort is normal. No respiratory distress.   Neurological:      General: No focal deficit present.      Mental Status: She is alert.   Psychiatric:         Mood and Affect: Mood normal.         Behavior: Behavior normal.         Assessment:       1. Fatigue, unspecified type    2. Sore throat    3. Poor appetite    4. History of pneumonia    5. Prediabetes    6. Class 3 severe obesity due to excess calories with serious comorbidity and body mass index (BMI) of 45.0 to 49.9 in adult        Plan:   Fatigue, unspecified type  -     POCT COVID-19 Rapid Screening  -     POCT Influenza A/B Molecular  -     Group A Strep, Molecular  -     CBC Auto Differential; Future; Expected date: 09/22/2023  -     COMPREHENSIVE METABOLIC PANEL;  Future; Expected date: 09/22/2023  -     TSH; Future; Expected date: 09/22/2023    Sore throat  -     POCT COVID-19 Rapid Screening  -     POCT Influenza A/B Molecular  -     Group A Strep, Molecular    Poor appetite    History of pneumonia  -     X-Ray Chest PA And Lateral; Future; Expected date: 09/22/2023    Prediabetes  -     HEMOGLOBIN A1C; Future; Expected date: 09/22/2023    Class 3 severe obesity due to excess calories with serious comorbidity and body mass index (BMI) of 45.0 to 49.9 in adult  -     Ambulatory referral/consult to [facility]; Future; Expected date: 09/29/2023      Today with an acute complaint   Fatigue  For the last week she has had increased amounts of fatigue.  She finds she is sleeping substantially more.  She has a general sense of achiness and seems to be hurting all over.  She reports the last time she felt this way she had walking pneumonia.  This was many years ago.  At present time her only respiratory symptom is an itchy throat.    She is not having any problems with urination.  She does have a decreased appetite.  She is no chest pain.  She is no shortness of breath.  She is no confusion.  Had some diarrhea.  She has had some nausea but she is had no vomiting.    Her vital signs are comforting but given her vague sense of feeling unwell I do feel some investigation is warranted.  Given it is now flu season we will test for such.  Given sore throat we are assessing for strep throat.  Given COVID is going around we will test for such as well.    Her history of walking pneumonia with similar symptoms is concerning.  It seems unlikely but I do feel an x-ray would be reasonable.  Additionally given vagueness of her symptoms a urinalysis to ensure no underlying UTI is contributing is prudent.    I would like to assess blood work as well including a thyroid stimulating hormone, comprehensive metabolic panel, complete blood count.    Unfortunately it is getting late on  Friday afternoon and the lab investigations will not be back in a timely fashion.  If she declines especially over the weekend I am strongly recommending emergency room evaluation.  She agrees and understands    I suspect she has viral illness.  Conservative treatment initially unless investigations indicate concerning pathology.     Encouraged good hydration.  I am okay alternating over-the-counter NSAIDs and Tylenol.  Offered Zofran but she does not feel her nausea warranted does not need it.      Prediabetes  This is not been checked in over a year.  We will include this into her lab work    Depression  I am very pleased to hear she is doing much better.  She is working with psychiatrist.  Continue medications as prescribed by her specialist    obesity  We will arrange for the lifestyle and wellness clinic visit to optimize efforts at weight loss    I am asking for follow up in roughly 2-3 months to check in on her chronic conditions and to assess her weight.    This note was verbally dictated, please excuse any type errors.

## 2023-09-28 ENCOUNTER — OFFICE VISIT (OUTPATIENT)
Dept: OTOLARYNGOLOGY | Facility: CLINIC | Age: 43
End: 2023-09-28
Payer: COMMERCIAL

## 2023-09-28 ENCOUNTER — OFFICE VISIT (OUTPATIENT)
Dept: INTERNAL MEDICINE | Facility: CLINIC | Age: 43
End: 2023-09-28
Payer: COMMERCIAL

## 2023-09-28 VITALS
HEART RATE: 88 BPM | OXYGEN SATURATION: 98 % | WEIGHT: 293 LBS | BODY MASS INDEX: 48.84 KG/M2 | TEMPERATURE: 97 F | DIASTOLIC BLOOD PRESSURE: 88 MMHG | RESPIRATION RATE: 18 BRPM | SYSTOLIC BLOOD PRESSURE: 116 MMHG

## 2023-09-28 DIAGNOSIS — H60.543 DERMATITIS OF BOTH EAR CANALS: Primary | ICD-10-CM

## 2023-09-28 DIAGNOSIS — E11.9 NEWLY DIAGNOSED DIABETES: Primary | ICD-10-CM

## 2023-09-28 DIAGNOSIS — E78.5 HYPERLIPIDEMIA, UNSPECIFIED HYPERLIPIDEMIA TYPE: ICD-10-CM

## 2023-09-28 DIAGNOSIS — E66.01 SEVERE OBESITY (BMI >= 40): ICD-10-CM

## 2023-09-28 DIAGNOSIS — H61.21 IMPACTED CERUMEN OF RIGHT EAR: ICD-10-CM

## 2023-09-28 PROCEDURE — 99999 PR PBB SHADOW E&M-EST. PATIENT-LVL II: ICD-10-PCS | Mod: PBBFAC,,, | Performed by: STUDENT IN AN ORGANIZED HEALTH CARE EDUCATION/TRAINING PROGRAM

## 2023-09-28 PROCEDURE — 3008F PR BODY MASS INDEX (BMI) DOCUMENTED: ICD-10-PCS | Mod: CPTII,S$GLB,, | Performed by: FAMILY MEDICINE

## 2023-09-28 PROCEDURE — 69210 PR REMOVAL IMPACTED CERUMEN REQUIRING INSTRUMENTATION, UNILATERAL: ICD-10-PCS | Mod: S$GLB,,, | Performed by: STUDENT IN AN ORGANIZED HEALTH CARE EDUCATION/TRAINING PROGRAM

## 2023-09-28 PROCEDURE — 99213 OFFICE O/P EST LOW 20 MIN: CPT | Mod: 25,S$GLB,, | Performed by: STUDENT IN AN ORGANIZED HEALTH CARE EDUCATION/TRAINING PROGRAM

## 2023-09-28 PROCEDURE — 3051F HG A1C>EQUAL 7.0%<8.0%: CPT | Mod: CPTII,S$GLB,, | Performed by: STUDENT IN AN ORGANIZED HEALTH CARE EDUCATION/TRAINING PROGRAM

## 2023-09-28 PROCEDURE — 1159F MED LIST DOCD IN RCRD: CPT | Mod: CPTII,S$GLB,, | Performed by: STUDENT IN AN ORGANIZED HEALTH CARE EDUCATION/TRAINING PROGRAM

## 2023-09-28 PROCEDURE — 99999 PR PBB SHADOW E&M-EST. PATIENT-LVL II: CPT | Mod: PBBFAC,,, | Performed by: STUDENT IN AN ORGANIZED HEALTH CARE EDUCATION/TRAINING PROGRAM

## 2023-09-28 PROCEDURE — 3051F PR MOST RECENT HEMOGLOBIN A1C LEVEL 7.0 - < 8.0%: ICD-10-PCS | Mod: CPTII,S$GLB,, | Performed by: FAMILY MEDICINE

## 2023-09-28 PROCEDURE — 3051F PR MOST RECENT HEMOGLOBIN A1C LEVEL 7.0 - < 8.0%: ICD-10-PCS | Mod: CPTII,S$GLB,, | Performed by: STUDENT IN AN ORGANIZED HEALTH CARE EDUCATION/TRAINING PROGRAM

## 2023-09-28 PROCEDURE — 3074F SYST BP LT 130 MM HG: CPT | Mod: CPTII,S$GLB,, | Performed by: FAMILY MEDICINE

## 2023-09-28 PROCEDURE — 3051F HG A1C>EQUAL 7.0%<8.0%: CPT | Mod: CPTII,S$GLB,, | Performed by: FAMILY MEDICINE

## 2023-09-28 PROCEDURE — 3079F PR MOST RECENT DIASTOLIC BLOOD PRESSURE 80-89 MM HG: ICD-10-PCS | Mod: CPTII,S$GLB,, | Performed by: FAMILY MEDICINE

## 2023-09-28 PROCEDURE — 99213 PR OFFICE/OUTPT VISIT, EST, LEVL III, 20-29 MIN: ICD-10-PCS | Mod: 25,S$GLB,, | Performed by: STUDENT IN AN ORGANIZED HEALTH CARE EDUCATION/TRAINING PROGRAM

## 2023-09-28 PROCEDURE — 99214 OFFICE O/P EST MOD 30 MIN: CPT | Mod: S$GLB,,, | Performed by: FAMILY MEDICINE

## 2023-09-28 PROCEDURE — 99999 PR PBB SHADOW E&M-EST. PATIENT-LVL IV: ICD-10-PCS | Mod: PBBFAC,,, | Performed by: FAMILY MEDICINE

## 2023-09-28 PROCEDURE — 99214 PR OFFICE/OUTPT VISIT, EST, LEVL IV, 30-39 MIN: ICD-10-PCS | Mod: S$GLB,,, | Performed by: FAMILY MEDICINE

## 2023-09-28 PROCEDURE — 99999 PR PBB SHADOW E&M-EST. PATIENT-LVL IV: CPT | Mod: PBBFAC,,, | Performed by: FAMILY MEDICINE

## 2023-09-28 PROCEDURE — 1159F PR MEDICATION LIST DOCUMENTED IN MEDICAL RECORD: ICD-10-PCS | Mod: CPTII,S$GLB,, | Performed by: STUDENT IN AN ORGANIZED HEALTH CARE EDUCATION/TRAINING PROGRAM

## 2023-09-28 PROCEDURE — 3079F DIAST BP 80-89 MM HG: CPT | Mod: CPTII,S$GLB,, | Performed by: FAMILY MEDICINE

## 2023-09-28 PROCEDURE — 69210 REMOVE IMPACTED EAR WAX UNI: CPT | Mod: S$GLB,,, | Performed by: STUDENT IN AN ORGANIZED HEALTH CARE EDUCATION/TRAINING PROGRAM

## 2023-09-28 PROCEDURE — 3074F PR MOST RECENT SYSTOLIC BLOOD PRESSURE < 130 MM HG: ICD-10-PCS | Mod: CPTII,S$GLB,, | Performed by: FAMILY MEDICINE

## 2023-09-28 PROCEDURE — 3008F BODY MASS INDEX DOCD: CPT | Mod: CPTII,S$GLB,, | Performed by: FAMILY MEDICINE

## 2023-09-28 RX ORDER — CLOBETASOL PROPIONATE 0.5 MG/G
CREAM TOPICAL 2 TIMES DAILY
Qty: 15 G | Refills: 0 | Status: SHIPPED | OUTPATIENT
Start: 2023-09-28 | End: 2023-10-05

## 2023-09-28 RX ORDER — TIRZEPATIDE 5 MG/.5ML
5 INJECTION, SOLUTION SUBCUTANEOUS
Qty: 4 PEN | Refills: 0 | Status: SHIPPED | OUTPATIENT
Start: 2023-09-28 | End: 2023-11-27

## 2023-09-28 RX ORDER — TIRZEPATIDE 2.5 MG/.5ML
2.5 INJECTION, SOLUTION SUBCUTANEOUS
Qty: 4 PEN | Refills: 0 | Status: SHIPPED | OUTPATIENT
Start: 2023-09-28 | End: 2023-11-27

## 2023-09-28 NOTE — PROGRESS NOTES
Subjective:       Patient ID: Brook Burdick is a 43 y.o. female.    Chief Complaint: Follow-up    Follow-up  Associated symptoms include fatigue, headaches, a visual change and weakness. Pertinent negatives include no chest pain.   Diabetes  She has type 2 diabetes mellitus. No MedicAlert identification noted. The initial diagnosis of diabetes was made 1 weeks ago. Hypoglycemia symptoms include dizziness, headaches, hunger and sleepiness. Pertinent negatives for hypoglycemia include no confusion, mood changes, nervousness/anxiousness, pallor, seizures, speech difficulty, sweats or tremors. Associated symptoms include blurred vision, fatigue, polydipsia, polyphagia, polyuria, visual change, weakness and weight loss. Pertinent negatives for diabetes include no chest pain, no foot paresthesias and no foot ulcerations. Pertinent negatives for hypoglycemia complications include no blackouts, no hospitalization, no nocturnal hypoglycemia, no required assistance and no required glucagon injection. Symptoms are improving. Pertinent negatives for diabetic complications include no autonomic neuropathy, CVA, heart disease, nephropathy, peripheral neuropathy, PVD or retinopathy. Risk factors for coronary artery disease include obesity. Current diabetic treatment includes diet and insulin injections. She is compliant with treatment some of the time. She is currently taking insulin pre-breakfast. Insulin injections are given by patient. Rotation sites for injection include the abdominal wall. Her weight is stable. She is following a diabetic and low salt diet. Meal planning includes avoidance of concentrated sweets and carbohydrate counting. She has not had a previous visit with a dietitian. She participates in exercise intermittently. She monitors blood glucose at home 1-2 x per week. She monitors urine at home <1 x per month. Blood glucose monitoring compliance is fair. There is no change in her home blood glucose trend.  She does not see a podiatrist.Eye exam is current.       Patient Active Problem List   Diagnosis    Chronic constipation    Family history of colonic polyps    Insulin resistance    Gastroesophageal reflux disease without esophagitis    Severe obesity (BMI >= 40)    Chronic headaches    Cough    Arthritis of knee    Neck pain    Right arm pain    Decreased ROM of intervertebral discs of cervical spine    Right arm weakness    Anxiety and depression    Bilateral carpal tunnel syndrome       Past Medical History:   Diagnosis Date    Arthritis of knee 2020    Encounter for blood transfusion     IBS (irritable bowel syndrome)     Liver disease     NALFD    Obesity     Pancreatitis        Past Surgical History:   Procedure Laterality Date    APPENDECTOMY      BILE DUCT EXPLORATION      CHOLECYSTECTOMY      INJECTION OF JOINT Bilateral 2020    Procedure: Bilateral intraarticular knee injection with local;  Surgeon: Ozzy Sofia MD;  Location: [facility];  Service: Pain Management;  Laterality: Bilateral;       Family History   Problem Relation Age of Onset    Atrial fibrillation Mother     Arthritis Mother     Miscarriages / Stillbirths Mother     Diabetes Father     Hypertension Father     Stroke Father     Diabetes Sister     Hypertension Sister     Hypertension Brother     Diabetes Brother     Cancer Maternal Grandfather     COPD Brother     Diabetes Brother     Hypertension Brother     Diabetes Sister     Miscarriages / Stillbirths Sister        Social History     Tobacco Use   Smoking Status Former    Current packs/day: 0.00    Types: Cigarettes    Quit date: 2022    Years since quittin.4   Smokeless Tobacco Never       Wt Readings from Last 5 Encounters:   23 (!) 163.3 kg (360 lb 1.9 oz)   23 (!) 162.3 kg (357 lb 12.9 oz)   23 (!) 165.6 kg (365 lb 1.3 oz)   23 (!) 158.4 kg (349 lb 3.3 oz)   23 115.6 kg (254 lb 13.6 oz)       For further HPI details, see  assessment and plan.    Review of Systems   Constitutional:  Positive for fatigue and weight loss.   Eyes:  Positive for blurred vision.   Cardiovascular:  Negative for chest pain.   Endocrine: Positive for polydipsia, polyphagia and polyuria.   Skin:  Negative for pallor.   Neurological:  Positive for dizziness, weakness and headaches. Negative for tremors, seizures and speech difficulty.   Psychiatric/Behavioral:  Negative for confusion. The patient is not nervous/anxious.        Objective:      Vitals:    09/28/23 1133   BP: 116/88   Pulse: 88   Resp: 18   Temp: 97.1 °F (36.2 °C)       Physical Exam  Constitutional:       General: She is not in acute distress.     Appearance: She is not ill-appearing.   Pulmonary:      Effort: Pulmonary effort is normal. No respiratory distress.   Neurological:      General: No focal deficit present.      Mental Status: She is alert.   Psychiatric:         Mood and Affect: Mood normal.         Behavior: Behavior normal.         Assessment:       1. Newly diagnosed diabetes    2. Hyperlipidemia, unspecified hyperlipidemia type    3. Severe obesity (BMI >= 40)        Plan:   Newly diagnosed diabetes  -     Ambulatory referral/consult to Optometry; Future; Expected date: 10/05/2023  -     Microalbumin/Creatinine Ratio, Urine  -     tirzepatide (MOUNJARO) 2.5 mg/0.5 mL PnIj; Inject 2.5 mg into the skin every 7 days.  Dispense: 4 pen ; Refill: 0  -     tirzepatide (MOUNJARO) 5 mg/0.5 mL PnIj; Inject 5 mg into the skin every 7 days.  Dispense: 4 pen ; Refill: 0    Hyperlipidemia, unspecified hyperlipidemia type    Severe obesity (BMI >= 40)  -     tirzepatide (MOUNJARO) 2.5 mg/0.5 mL PnIj; Inject 2.5 mg into the skin every 7 days.  Dispense: 4 pen ; Refill: 0  -     tirzepatide (MOUNJARO) 5 mg/0.5 mL PnIj; Inject 5 mg into the skin every 7 days.  Dispense: 4 pen ; Refill: 0        Morbid obesity  Body mass index 48.84   Type 2 diabetes       Patient has no family history of thyroid  cancer, multiple endocrine neoplasm to.  Nor does she have a personal history of such.  She does have a personal history of pancreatitis at age 6.  It sounds simultaneous with gallbladder disease.  Her gallbladder is removed.  She is had no further pancreatitis episodes.    She is taking Ozempic.  Last dose was 0.25 mg within the week.  She is tolerated the medication well without any problems.    I would like to see if we can get her on to [facility] given its greater weight reduction A1c reducing abilities.    We will start her at a low dose of 2.5 mg for 1 month.  After once weekly injections I want her to increase to the 5 mg dose once weekly.  I would like to see her again in 7 weeks to determine titration upwards.    If insurance necessitates ozempic will be prescribed instead.    Discussed the pros and the cons and the adverse effects of the medication.  Similar risk as compared to Ozempic.  Discussed the risk of pancreatitis with the patient.  I do not suspect this will occur given I suspect her pancreatitis at age 6 was secondary to gallbladder pathology.  If she ever develops severe abdominal pain advised she seek medical attention.  I feel the risk of ongoing uncontrolled obesity and diabetes outweighs the risk of the pancreatitis.    We will arrange for annual eye exam   We will arrange for annual urine testing   We will do her annual foot exam next visit    Discussed goals of diabetes   I want her A1c to never go above 7 again.  I want her blood pressure persistently less than 140/90.  I want her LDL cholesterol to be less than 70    Discussed annual to do list this includes eye exam, foot exam, urine testing  Diabetic should be up-to-date on all immunizations and I will encourage such    Discussed the absolute importance of lifestyle modification to address this.  Physical activity and dietary changes.    Discussed the role of ACE inhibitors and angiotensin receptor blockers for renal protective  purposes.  We must consider this in the future.  Will determine at present based off of her urine microalbumin test.    HLD  Substantial elevation 18 yo  Needs updating  Will do at time of next a1c  Ordering at next visit  LDL goal under 70 given DM status      7 week f/u    This note was verbally dictated, please excuse any type errors.

## 2023-09-28 NOTE — PROGRESS NOTES
Chief complaint:   Chief Complaint   Patient presents with    Otalgia     C/O B/L Ear pain and itching           Referring Provider:  No referring provider defined for this encounter.    History of Present Illness:     Ms. Burdick is a 43 y.o. female presenting for evaluation of right ear pain and swelling for the last 4 days.   Additional symptoms that also have been associated are muffled hearing, mass of ear canal, drainage. The patient has tried ciprodex with some relief.  The patient denies hearing loss, vertigo.      Does have itchy ears all the time.     The patient denies significant hearing loss risk factors, ototoxic medication exposure, chronic vestibular suppressant use, head/ facial/ merry trauma, and otologic surgery.      Return clinic visit, 4/24/23  Pain resolved  No drainage  Hearing stable  Itching AU  Completed abx and mupirocin    Return clinic visit, 9/28/23  Has seen dermatology. Recommended oil drops. Has not helped much. No drainage, pain, hearing loss.  Does have eczema on her arms too.        History        Past Medical History:   Past Medical History:   Diagnosis Date    Arthritis of knee 9/30/2020    Encounter for blood transfusion     IBS (irritable bowel syndrome)     Liver disease     NALFD    Obesity     Pancreatitis     .          Past Surgical History:  Past Surgical History:   Procedure Laterality Date    APPENDECTOMY      BILE DUCT EXPLORATION      CHOLECYSTECTOMY      INJECTION OF JOINT Bilateral 09/30/2020    Procedure: Bilateral intraarticular knee injection with local;  Surgeon: Ozzy Sofia MD;  Location: [facility];  Service: Pain Management;  Laterality: Bilateral;   .         Medications: Medication list was reviewed. She  has a current medication list which includes the following prescription(s): acetaminophen, alprazolam, escitalopram oxalate, fluocinolone acetonide oil, lisdexamfetamine, naproxen, pregabalin, mounjaro, mounjaro, tizanidine, clobetasol, and  [DISCONTINUED] sertraline.         Allergies:   Review of patient's allergies indicates:   Allergen Reactions    Penicillins Hives            Family history: family history includes Arthritis in her mother; Atrial fibrillation in her mother; COPD in her brother; Cancer in her maternal grandfather; Diabetes in her brother, brother, father, sister, and sister; Hypertension in her brother, brother, father, and sister; Miscarriages / Stillbirths in her mother and sister; Stroke in her father.         Social History          Alcohol use:  reports that she does not currently use alcohol after a past usage of about 2.0 standard drinks of alcohol per week.            Tobacco:  reports that she quit smoking about 18 months ago. Her smoking use included cigarettes. She has never used smokeless tobacco.         Please see the patient's intake form for full details of past medical history, past surgical history, family history, social history and review of systems. ?This information was reviewed by me and noted.      Physical Examination     General: Well developed, well nourished, well hydrated. Verbal with a strong voice and not dysphonic.     Head/Face: Normocephalic, atraumatic. No scars or lesions. Facial musculature equal.     Eyes: No scleral icterus or conjunctival hemorrhage. EOMI. PERRLA.     Ears:     Right ear: No gross deformity. EAC with moderate cerumen, after removal, revealed mild dried flaking skin.  The TM is intact with a pneumatized middle ear. No signs of retraction, fluid or infection.      Left ear: No gross deformity. EAC has mild dried flaking skin. The TM is intact with a pneumatized middle ear. No signs of retraction, fluid or infection.     Hearing: grossly intact    Nose: No gross deformity or lesions. No purulent discharge. No significant NSD.      Mouth/Oropharynx: Lips without any lesions. No mucosal lesions within the oropharynx. No tonsillar exudate or lesions. Pharyngeal walls symmetrical.  Uvula midline. Tongue midline without lesions.     Neck: Trachea midline. No masses. No thyromegaly or nodules palpated.     Lymphatic: No lymphadenopathy in the neck.     Extremities: No cyanosis. Warm and well-perfused.     Skin: No scars or lesions on face or neck.      Neurologic: Moving all extremities without gross abnormality.CN II-XII grossly intact. House-Brackmann 1/6. No signs of nystagmus.      Psych: Alert and oriented to person, place, and time with an appropriate mood and affect.     Data review:    Review of records:      I reviewed records from the referring provider's office visits.  These describe the history, workup, and/or treatment of this problem thus far.    Micro  Aerobic Bacterial Culture Skin gordy,  no predominant organism      Procedure: ear microscopy with removal of cerumen    Description: The patient was in agreement with the examination and debridement of the ears. Removal of the cerumen required use of an operating microscope and multiple micro-instruments.     With the patient in the supine position, we used the operating microscope to examine both ears with the appropriate sized ear speculum.  A variety of sterile, micro-instruments were utilized to remove the cerumen atraumatically.  I performed the procedure which required a significant amount of time and effort. The tympanic membrane was then well visualized.  The patient tolerated the procedure well and there were no complications.    Findings:   Right ear had moderate wax, the EAC was normal, and the tympanic membrane was intact with no evidence of middle ear fluid.         Assessment/Plan:      1. Dermatitis of both ear canals    2. Impacted cerumen of right ear        Minimal improvement with dermoitic  Clobetasol for one week at a time for severe flares  Avoid instrumentation   Return to clinic as needed      Kar Rodriguez MD  [facility]   [facility]  [address] [place] [address]  P: [phone]  F: [phone]    An addendum has been made to the medical record to reflect the services provided.  The addendum is signed and bears the current date, time, and reason for the additional information being added to the medical record.

## 2023-10-03 ENCOUNTER — PATIENT MESSAGE (OUTPATIENT)
Dept: INTERNAL MEDICINE | Facility: CLINIC | Age: 43
End: 2023-10-03
Payer: COMMERCIAL

## 2023-10-26 ENCOUNTER — OFFICE VISIT (OUTPATIENT)
Dept: URGENT CARE | Facility: CLINIC | Age: 43
End: 2023-10-26
Payer: COMMERCIAL

## 2023-10-26 VITALS
SYSTOLIC BLOOD PRESSURE: 144 MMHG | TEMPERATURE: 98 F | BODY MASS INDEX: 39.68 KG/M2 | WEIGHT: 293 LBS | DIASTOLIC BLOOD PRESSURE: 79 MMHG | HEART RATE: 74 BPM | HEIGHT: 72 IN | RESPIRATION RATE: 20 BRPM | OXYGEN SATURATION: 96 %

## 2023-10-26 DIAGNOSIS — R68.83 CHILLS (WITHOUT FEVER): ICD-10-CM

## 2023-10-26 DIAGNOSIS — R09.81 NASAL CONGESTION: ICD-10-CM

## 2023-10-26 DIAGNOSIS — J06.9 VIRAL URI: Primary | ICD-10-CM

## 2023-10-26 DIAGNOSIS — R09.82 POST-NASAL DRIP: ICD-10-CM

## 2023-10-26 LAB
CTP QC/QA: YES
SARS-COV-2 AG RESP QL IA.RAPID: NEGATIVE

## 2023-10-26 PROCEDURE — 87811 SARS CORONAVIRUS 2 ANTIGEN POCT, MANUAL READ: ICD-10-PCS | Mod: QW,S$GLB,,

## 2023-10-26 PROCEDURE — 99213 OFFICE O/P EST LOW 20 MIN: CPT | Mod: S$GLB,,,

## 2023-10-26 PROCEDURE — 99213 PR OFFICE/OUTPT VISIT, EST, LEVL III, 20-29 MIN: ICD-10-PCS | Mod: S$GLB,,,

## 2023-10-26 PROCEDURE — 87811 SARS-COV-2 COVID19 W/OPTIC: CPT | Mod: QW,S$GLB,,

## 2023-10-26 RX ORDER — FLUTICASONE PROPIONATE 50 MCG
2 SPRAY, SUSPENSION (ML) NASAL DAILY
Qty: 9.9 ML | Refills: 0 | Status: SHIPPED | OUTPATIENT
Start: 2023-10-26 | End: 2023-11-25

## 2023-10-26 NOTE — PATIENT INSTRUCTIONS
Reviewed negative COVID-19 virus test with patient who verbalized understanding.  Advised patient that symptoms are indicative of an upper respiratory infection which is viral in nature and should be treated symptomatically.  We discussed over-the-counter medications as well as home remedies to help with current symptoms.  We also discussed a wait and see antibiotic plan which the patient verbalized understanding.  Patient educational handouts also included in discharge paperwork for patient who verbalized understanding agrees with plan of care.  They deny any further questions or concerns at this time.  Patient exits exam room in no acute distress.      PLEASE READ YOUR DISCHARGE INSTRUCTIONS ENTIRELY AS IT CONTAINS IMPORTANT INFORMATION.      - Please drink plenty of fluids.  - Please get plenty of rest.  - You can take plain Mucinex (guaifenesin) 1200 mg twice a day to help loosen mucous.   - Use over the counter Flonase as directed  - Please take an over the counter antihistamine medication (Allegra/Claritin/Zyrtec/Xyzal) of your choice as directed. These are antihistamines that can help with runny nose, nasal congestion, sneezing, and helps to dry up post-nasal drip, which usually causes sore throat and cough.    -If you do NOT have high blood pressure, you may use a decongestant form (D)  of this medication (ie. Claritin- D, zyrtec-D, allegra-D, Mucinex-D) or if you do not take the D form, you can take sudafed (pseudoephedrine) over the counter, which is a decongestant. Do NOT take two decongestant (D) medications at the same time (such as mucinex-D and claritin-D or plain sudafed and claritin D). Dextromethorphan (DM) is a cough suppressant over the counter (ie. mucinex DM, robitussin, delsym; dayquil/nyquil has DM as well.)    If you do have Hypertension or palpitations, it is safe to take Coricidin HBP for relief of sinus symptoms.    - If not allergic, please take over the counter Tylenol (Acetaminophen)  and/or Motrin (Ibuprofen) as directed for control of pain and/or fever.  Avoid tylenol if you have a history of liver disease. Do not take ibuprofen if you have a history of GI bleeding, kidney disease, or if you take blood thinners.  Please follow up with your primary care doctor or specialist as needed.    Sore throat recommendations: Warm fluids, warm salt water gargles, throat lozenges, tea, honey, soup, rest, hydration.    Use over the counter flonase: one spray each nostril twice daily OR two sprays each nostril once daily for sinus congestion and postnasal drip. This is a steroid nasal spray that works locally over time to decrease the inflammation in your nose/sinuses and help with allergic symptoms. This is not an quick- relief spray like afrin, but it works well if used daily.  Discontinue if you develop nose bleed    Sinus rinses DO NOT USE TAP WATER, if you must, water must be a rolling boil for 1 minute, let it cool, then use.  May use distilled water, or over the counter nasal saline rinses.  Vics vapor rub in shower to help open nasal passages.  May use nasal gel to keep passages moisturized.  May use Nasal saline sprays during the day for added relief of congestion.   For those who go to the gym, please do not use the sauna or steam room now to clear sinuses.    If you  smoke, please stop smoking.    Please return or see your primary care doctor if you develop new or worsening symptoms.     Please arrange follow up with your primary medical clinic as soon as possible. You must understand that you've received an Urgent Care treatment only and that you may be released before all of your medical problems are known or treated. You, the patient, will arrange for follow up as instructed. If your symptoms worsen or fail to improve you should go to the Emergency Room.

## 2023-10-26 NOTE — PROGRESS NOTES
"Subjective:      Patient ID: Brook Burdick is a 43 y.o. female.    Vitals:  height is 6' (1.829 m) and weight is 163.3 kg (360 lb) (abnormal). Her temperature is 98 °F (36.7 °C). Her blood pressure is 144/79 (abnormal) and her pulse is 74. Her respiration is 20 and oxygen saturation is 96%.     Chief Complaint: Nasal Congestion    42 yo female Patient presents with chills, generalized body aches, nasal congestion with runny nose "that when I blew too hard, I saw spots of blood", slight sore throat, post nasal drip, generalized headache.   Symptoms started Sunday night, x 5 days ago but worsened 4 days ago. Denies known exposure to covid or flu. Has been taking Mucinex and Nyquil. Denies neck stiffness, vision changes, slurred speech, abdominal pain, vomiting. Allergic to PCN.     Sinus Problem  This is a new problem. The current episode started in the past 7 days. The problem is unchanged. There has been no fever. Her pain is at a severity of 6/10. The pain is mild. Associated symptoms include chills, congestion (nasal), headaches (but PMHx significant for chronic headaches) and a sore throat (mild). Pertinent negatives include no coughing, diaphoresis, ear pain, hoarse voice, neck pain, shortness of breath, sinus pressure, sneezing or swollen glands. Treatments tried: Mucinex nyquil. The treatment provided mild relief.       Constitution: Positive for chills. Negative for sweating and fever.   HENT:  Positive for congestion (nasal), postnasal drip and sore throat (mild). Negative for ear pain, ear discharge, sinus pain, sinus pressure, trouble swallowing and voice change.    Neck: Negative for neck pain and neck stiffness.   Cardiovascular:  Negative for chest pain.   Eyes:  Negative for eye discharge and eye redness.   Respiratory:  Negative for cough and shortness of breath.    Gastrointestinal:  Negative for abdominal pain and vomiting.   Allergic/Immunologic: Negative for sneezing.   Neurological:  Positive " for headaches (but PMHx significant for chronic headaches). Negative for dizziness, passing out, speech difficulty, history of migraines, numbness and tingling.      Objective:     Vitals:    10/26/23 1656   BP: (!) 144/79   Pulse: 74   Resp: 20   Temp: 98 °F (36.7 °C)       Physical Exam   Constitutional: She is oriented to person, place, and time. She appears well-developed. She is cooperative.  Non-toxic appearance. She does not appear ill. No distress. awake  HENT:   Head: Normocephalic and atraumatic.   Ears:   Right Ear: Hearing, tympanic membrane, external ear and ear canal normal. No cerumen not present. Tympanic membrane is not erythematous and not bulging. No middle ear effusion. impacted cerumen  Left Ear: Hearing, tympanic membrane, external ear and ear canal normal. No cerumen not present. Tympanic membrane is not erythematous and not bulging.  No middle ear effusion. impacted cerumen  Nose: Nose normal. No mucosal edema, rhinorrhea or nasal deformity. No epistaxis. Right sinus exhibits no maxillary sinus tenderness and no frontal sinus tenderness. Left sinus exhibits no maxillary sinus tenderness and no frontal sinus tenderness.   Mouth/Throat: Uvula is midline, oropharynx is clear and moist and mucous membranes are normal. Mucous membranes are moist. No trismus in the jaw. Normal dentition. No uvula swelling. Cobblestoning present. No oropharyngeal exudate, posterior oropharyngeal edema or posterior oropharyngeal erythema. Tonsils are 1+ on the right. Tonsils are 1+ on the left. No tonsillar exudate.   Eyes: Conjunctivae and lids are normal. Pupils are equal, round, and reactive to light. Right eye exhibits no discharge. Left eye exhibits no discharge. No scleral icterus. periorbital hyperpigmentation  Neck: Trachea normal and phonation normal. Neck supple. No edema present. No erythema present. No neck rigidity present.   Cardiovascular: Normal rate, regular rhythm, normal heart sounds and normal  pulses.   Pulmonary/Chest: Effort normal and breath sounds normal. No accessory muscle usage. No respiratory distress. She has no decreased breath sounds. She has no wheezes. She has no rhonchi. She has no rales.   Abdominal: Normal appearance.   Musculoskeletal: Normal range of motion.         General: No deformity. Normal range of motion.   Neurological: She is alert and oriented to person, place, and time. She exhibits normal muscle tone. Coordination and gait normal.   Skin: Skin is warm, dry, intact, not diaphoretic, not pale and no rash.   Psychiatric: Her speech is normal and behavior is normal. Judgment and thought content normal.   Nursing note and vitals reviewed.      Assessment:     1. Viral URI    2. Nasal congestion    3. Chills (without fever)    4. Post-nasal drip      Results for orders placed or performed in visit on 10/26/23   SARS Coronavirus 2 Antigen, POCT Manual Read   Result Value Ref Range    SARS Coronavirus 2 Antigen Negative Negative     Acceptable Yes        Plan:       Viral URI  -     fluticasone propionate (FLONASE) 50 mcg/actuation nasal spray; 2 sprays (100 mcg total) by Each Nostril route once daily.  Dispense: 9.9 mL; Refill: 0    Nasal congestion  -     SARS Coronavirus 2 Antigen, POCT Manual Read  -     fluticasone propionate (FLONASE) 50 mcg/actuation nasal spray; 2 sprays (100 mcg total) by Each Nostril route once daily.  Dispense: 9.9 mL; Refill: 0    Chills (without fever)    Post-nasal drip  -     fluticasone propionate (FLONASE) 50 mcg/actuation nasal spray; 2 sprays (100 mcg total) by Each Nostril route once daily.  Dispense: 9.9 mL; Refill: 0        Patient Instructions   Reviewed negative COVID-19 virus test with patient who verbalized understanding.  Advised patient that symptoms are indicative of an upper respiratory infection which is viral in nature and should be treated symptomatically.  We discussed over-the-counter medications as well as home  remedies to help with current symptoms.  We also discussed a wait and see antibiotic plan which the patient verbalized understanding.  Patient educational handouts also included in discharge paperwork for patient who verbalized understanding agrees with plan of care.  They deny any further questions or concerns at this time.  Patient exits exam room in no acute distress.      PLEASE READ YOUR DISCHARGE INSTRUCTIONS ENTIRELY AS IT CONTAINS IMPORTANT INFORMATION.      - Please drink plenty of fluids.  - Please get plenty of rest.  - You can take plain Mucinex (guaifenesin) 1200 mg twice a day to help loosen mucous.   - Use over the counter Flonase as directed  - Please take an over the counter antihistamine medication (Allegra/Claritin/Zyrtec/Xyzal) of your choice as directed. These are antihistamines that can help with runny nose, nasal congestion, sneezing, and helps to dry up post-nasal drip, which usually causes sore throat and cough.    -If you do NOT have high blood pressure, you may use a decongestant form (D)  of this medication (ie. Claritin- D, zyrtec-D, allegra-D, Mucinex-D) or if you do not take the D form, you can take sudafed (pseudoephedrine) over the counter, which is a decongestant. Do NOT take two decongestant (D) medications at the same time (such as mucinex-D and claritin-D or plain sudafed and claritin D). Dextromethorphan (DM) is a cough suppressant over the counter (ie. mucinex DM, robitussin, delsym; dayquil/nyquil has DM as well.)    If you do have Hypertension or palpitations, it is safe to take Coricidin HBP for relief of sinus symptoms.    - If not allergic, please take over the counter Tylenol (Acetaminophen) and/or Motrin (Ibuprofen) as directed for control of pain and/or fever.  Avoid tylenol if you have a history of liver disease. Do not take ibuprofen if you have a history of GI bleeding, kidney disease, or if you take blood thinners.  Please follow up with your primary care doctor or  specialist as needed.    Sore throat recommendations: Warm fluids, warm salt water gargles, throat lozenges, tea, honey, soup, rest, hydration.    Use over the counter flonase: one spray each nostril twice daily OR two sprays each nostril once daily for sinus congestion and postnasal drip. This is a steroid nasal spray that works locally over time to decrease the inflammation in your nose/sinuses and help with allergic symptoms. This is not an quick- relief spray like afrin, but it works well if used daily.  Discontinue if you develop nose bleed    Sinus rinses DO NOT USE TAP WATER, if you must, water must be a rolling boil for 1 minute, let it cool, then use.  May use distilled water, or over the counter nasal saline rinses.  Vics vapor rub in shower to help open nasal passages.  May use nasal gel to keep passages moisturized.  May use Nasal saline sprays during the day for added relief of congestion.   For those who go to the gym, please do not use the sauna or steam room now to clear sinuses.    If you  smoke, please stop smoking.    Please return or see your primary care doctor if you develop new or worsening symptoms.     Please arrange follow up with your primary medical clinic as soon as possible. You must understand that you've received an Urgent Care treatment only and that you may be released before all of your medical problems are known or treated. You, the patient, will arrange for follow up as instructed. If your symptoms worsen or fail to improve you should go to the Emergency Room.

## 2023-10-29 ENCOUNTER — TELEPHONE (OUTPATIENT)
Dept: URGENT CARE | Facility: CLINIC | Age: 43
End: 2023-10-29
Payer: COMMERCIAL

## 2023-11-27 ENCOUNTER — OFFICE VISIT (OUTPATIENT)
Dept: INTERNAL MEDICINE | Facility: CLINIC | Age: 43
End: 2023-11-27
Payer: COMMERCIAL

## 2023-11-27 VITALS
HEART RATE: 118 BPM | SYSTOLIC BLOOD PRESSURE: 120 MMHG | WEIGHT: 293 LBS | OXYGEN SATURATION: 97 % | DIASTOLIC BLOOD PRESSURE: 80 MMHG | HEIGHT: 72 IN | TEMPERATURE: 96 F | BODY MASS INDEX: 39.68 KG/M2

## 2023-11-27 DIAGNOSIS — Z12.31 ENCOUNTER FOR SCREENING MAMMOGRAM FOR MALIGNANT NEOPLASM OF BREAST: ICD-10-CM

## 2023-11-27 DIAGNOSIS — R79.89 ELEVATED LIVER FUNCTION TESTS: ICD-10-CM

## 2023-11-27 DIAGNOSIS — E66.01 SEVERE OBESITY (BMI >= 40): ICD-10-CM

## 2023-11-27 DIAGNOSIS — F32.A ANXIETY AND DEPRESSION: ICD-10-CM

## 2023-11-27 DIAGNOSIS — R42 VERTIGO: ICD-10-CM

## 2023-11-27 DIAGNOSIS — Z79.1 NSAID LONG-TERM USE: ICD-10-CM

## 2023-11-27 DIAGNOSIS — E11.9 NEWLY DIAGNOSED DIABETES: Primary | ICD-10-CM

## 2023-11-27 DIAGNOSIS — F41.9 ANXIETY AND DEPRESSION: ICD-10-CM

## 2023-11-27 DIAGNOSIS — Z11.4 SCREENING FOR HIV (HUMAN IMMUNODEFICIENCY VIRUS): ICD-10-CM

## 2023-11-27 PROCEDURE — 99999 PR PBB SHADOW E&M-EST. PATIENT-LVL IV: ICD-10-PCS | Mod: PBBFAC,,, | Performed by: FAMILY MEDICINE

## 2023-11-27 PROCEDURE — 1159F MED LIST DOCD IN RCRD: CPT | Mod: CPTII,S$GLB,, | Performed by: FAMILY MEDICINE

## 2023-11-27 PROCEDURE — 1159F PR MEDICATION LIST DOCUMENTED IN MEDICAL RECORD: ICD-10-PCS | Mod: CPTII,S$GLB,, | Performed by: FAMILY MEDICINE

## 2023-11-27 PROCEDURE — 3051F PR MOST RECENT HEMOGLOBIN A1C LEVEL 7.0 - < 8.0%: ICD-10-PCS | Mod: CPTII,S$GLB,, | Performed by: FAMILY MEDICINE

## 2023-11-27 PROCEDURE — 99999 PR PBB SHADOW E&M-EST. PATIENT-LVL IV: CPT | Mod: PBBFAC,,, | Performed by: FAMILY MEDICINE

## 2023-11-27 PROCEDURE — 3008F PR BODY MASS INDEX (BMI) DOCUMENTED: ICD-10-PCS | Mod: CPTII,S$GLB,, | Performed by: FAMILY MEDICINE

## 2023-11-27 PROCEDURE — 99214 OFFICE O/P EST MOD 30 MIN: CPT | Mod: S$GLB,,, | Performed by: FAMILY MEDICINE

## 2023-11-27 PROCEDURE — 3079F DIAST BP 80-89 MM HG: CPT | Mod: CPTII,S$GLB,, | Performed by: FAMILY MEDICINE

## 2023-11-27 PROCEDURE — 3051F HG A1C>EQUAL 7.0%<8.0%: CPT | Mod: CPTII,S$GLB,, | Performed by: FAMILY MEDICINE

## 2023-11-27 PROCEDURE — 99214 PR OFFICE/OUTPT VISIT, EST, LEVL IV, 30-39 MIN: ICD-10-PCS | Mod: S$GLB,,, | Performed by: FAMILY MEDICINE

## 2023-11-27 PROCEDURE — 3074F SYST BP LT 130 MM HG: CPT | Mod: CPTII,S$GLB,, | Performed by: FAMILY MEDICINE

## 2023-11-27 PROCEDURE — 3008F BODY MASS INDEX DOCD: CPT | Mod: CPTII,S$GLB,, | Performed by: FAMILY MEDICINE

## 2023-11-27 PROCEDURE — 3074F PR MOST RECENT SYSTOLIC BLOOD PRESSURE < 130 MM HG: ICD-10-PCS | Mod: CPTII,S$GLB,, | Performed by: FAMILY MEDICINE

## 2023-11-27 PROCEDURE — 3079F PR MOST RECENT DIASTOLIC BLOOD PRESSURE 80-89 MM HG: ICD-10-PCS | Mod: CPTII,S$GLB,, | Performed by: FAMILY MEDICINE

## 2023-11-27 RX ORDER — PANTOPRAZOLE SODIUM 20 MG/1
20 TABLET, DELAYED RELEASE ORAL DAILY
Qty: 90 TABLET | Refills: 1 | Status: SHIPPED | OUTPATIENT
Start: 2023-11-27 | End: 2024-11-26

## 2023-11-27 RX ORDER — MECLIZINE HYDROCHLORIDE 25 MG/1
25 TABLET ORAL 3 TIMES DAILY PRN
Qty: 30 TABLET | Refills: 0 | Status: SHIPPED | OUTPATIENT
Start: 2023-11-27

## 2023-11-27 NOTE — PROGRESS NOTES
Subjective:       Patient ID: Brook Burdick is a 43 y.o. female.    Chief Complaint: Follow-up (DM/MED)    HPI    Patient Active Problem List   Diagnosis    Chronic constipation    Family history of colonic polyps    Insulin resistance    Gastroesophageal reflux disease without esophagitis    Severe obesity (BMI >= 40)    Chronic headaches    Cough    Arthritis of knee    Neck pain    Right arm pain    Decreased ROM of intervertebral discs of cervical spine    Right arm weakness    Anxiety and depression    Bilateral carpal tunnel syndrome       Past Medical History:   Diagnosis Date    Arthritis of knee 2020    Encounter for blood transfusion     IBS (irritable bowel syndrome)     Liver disease     NALFD    Obesity     Pancreatitis        Past Surgical History:   Procedure Laterality Date    APPENDECTOMY      BILE DUCT EXPLORATION      CHOLECYSTECTOMY      INJECTION OF JOINT Bilateral 2020    Procedure: Bilateral intraarticular knee injection with local;  Surgeon: Ozzy Sofia MD;  Location: [facility];  Service: Pain Management;  Laterality: Bilateral;       Family History   Problem Relation Age of Onset    Atrial fibrillation Mother     Arthritis Mother     Miscarriages / Stillbirths Mother     Diabetes Father     Hypertension Father     Stroke Father     Diabetes Sister     Hypertension Sister     Hypertension Brother     Diabetes Brother     Cancer Maternal Grandfather     COPD Brother     Diabetes Brother     Hypertension Brother     Diabetes Sister     Miscarriages / Stillbirths Sister        Social History     Tobacco Use   Smoking Status Former    Current packs/day: 0.00    Types: Cigarettes    Quit date: 2022    Years since quittin.6   Smokeless Tobacco Never       Wt Readings from Last 5 Encounters:   23 (!) 159.5 kg (351 lb 10.1 oz)   10/26/23 (!) 163.3 kg (360 lb)   23 (!) 163.3 kg (360 lb 1.9 oz)   23 (!) 162.3 kg (357 lb 12.9 oz)   23 (!) 165.6 kg  (365 lb 1.3 oz)       For further HPI details, see assessment and plan.    Review of Systems   Constitutional:  Negative for chills and fever.   Respiratory:  Negative for shortness of breath.    Cardiovascular:  Negative for chest pain.   Gastrointestinal:         GERD         Objective:      Vitals:    11/27/23 1530   BP: 120/80   Pulse:    Temp:        Physical Exam  Constitutional:       General: She is not in acute distress.     Appearance: She is not ill-appearing.   Pulmonary:      Effort: Pulmonary effort is normal. No respiratory distress.   Neurological:      General: No focal deficit present.      Mental Status: She is alert.   Psychiatric:         Mood and Affect: Mood normal.         Behavior: Behavior normal.         Assessment:       1. Newly diagnosed diabetes    2. Elevated liver function tests    3. Severe obesity (BMI >= 40)    4. Encounter for screening mammogram for malignant neoplasm of breast    5. Screening for HIV (human immunodeficiency virus)    6. Anxiety and depression    7. Vertigo    8. NSAID long-term use        Plan:         Newly diagnosed diabetes   Severe obesity  Body mass index 47.69   I am pleased to hear she is tolerating the Mounjaro.  She is on 5 mg.  We will continue to titrate the dose upwards.  I am going to send in his 7.5 mg dose.  I would like to see her again in 2 months to determine further titration.  I do ask she gets labs a few days prior to our next encounter     Plan to obtain lipid panel at the time of her next lab work.  If her LDL is above 70 we will need to discuss statin initiation    Fatty liver   Noted mild elevation liver function at last check.  Will include a liver function testing with our upcoming lab work    Gastroesophageal reflux disease   Patient reports chronic.  The Mounjaro is likely exacerbating.  We will start her on Protonix.  Dietary modification advised avoid triggers     Chronic idiopathic constipation  Potentially also exacerbated by  Mounjaro.  Trial low dose of Linzess.  She is taking the higher dose in the past and it caused excessive amounts of bowel movements.  If the same occurs with a low dose we will avoid this in the future.    Screening for breast cancer   Obtain mammogram    Patient's pressure is mildly elevated in the office.  She does check her pressures at home.  She is confident in the machine.  Encourage he bring it to my next encounter.  Reports 120/80 at home.  Will reflect that in the chart    Anxiety and depression   ADHD  Working with Psychiatry.  She did missed a few appointments.  Encouraged good compliance.  Continue medicines as prescribed.    History of vertigo  Recently she did have some dizziness associated with movement, head movement, nausea.  Did seem to resolve on its own.  Will prescribe as needed meclizine just in case she has a recurrence.  If this does come back would recommend ENT evaluation    Long-term use of nonsteroidal anti-inflammatory drug.  Other recommendation pain management.  I am glad we are starting proton pump inhibitor for gastric lining protection.  If she does experience any significant epigastric pain or dark stools discontinue NSAIDs soon as possible.  Renal function appeared appropriate.  Would encouraged she space out NSAID to use as little as possible.  Continue working with the pain management.  Patient has tried Cymbalta in the past.  Unfortunately it was not beneficial for her.  Declines vaccinations    Lab in 6 weeks.  Follow up in 8 weeks.  We will review labs at that encounter together    This note was verbally dictated, please excuse any type errors.

## 2023-11-29 ENCOUNTER — PATIENT MESSAGE (OUTPATIENT)
Dept: PAIN MEDICINE | Facility: CLINIC | Age: 43
End: 2023-11-29
Payer: COMMERCIAL

## 2023-11-29 DIAGNOSIS — E11.9 TYPE 2 DIABETES MELLITUS WITHOUT COMPLICATION: ICD-10-CM

## 2023-12-01 NOTE — TELEPHONE ENCOUNTER
No care due was identified.  [facility] Embedded Care Due Messages. Reference number: [id number].   12/01/2023 2:24:16 PM CST

## 2023-12-03 ENCOUNTER — PATIENT MESSAGE (OUTPATIENT)
Dept: INTERNAL MEDICINE | Facility: CLINIC | Age: 43
End: 2023-12-03
Payer: COMMERCIAL

## 2023-12-06 ENCOUNTER — TELEPHONE (OUTPATIENT)
Dept: INTERNAL MEDICINE | Facility: CLINIC | Age: 43
End: 2023-12-06
Payer: COMMERCIAL

## 2023-12-06 NOTE — TELEPHONE ENCOUNTER
Informed to have the R fax over to us the form needed to fill out for mounjaro and will fax it over. PA was sent on 12/04/23.

## 2023-12-06 NOTE — TELEPHONE ENCOUNTER
----- Message from Sharyn Blackwell sent at 12/6/2023  3:44 PM CST -----  Contact: Brook Johnston is calling in regards to a PA. Please call back at [phone]              Thanks  SW

## 2023-12-06 NOTE — TELEPHONE ENCOUNTER
----- Message from Fabiola Fields sent at 12/6/2023  4:02 PM CST -----  Contact: Brook Johnston was returning the phone call. Pt states there is a fax coming from [employer], it will need to be completed and faxed back. Please call her back at [phone].    Thanks  TS

## 2023-12-07 ENCOUNTER — TELEPHONE (OUTPATIENT)
Dept: INTERNAL MEDICINE | Facility: CLINIC | Age: 43
End: 2023-12-07
Payer: COMMERCIAL

## 2023-12-07 ENCOUNTER — PATIENT MESSAGE (OUTPATIENT)
Dept: PSYCHIATRY | Facility: CLINIC | Age: 43
End: 2023-12-07

## 2023-12-07 ENCOUNTER — PATIENT MESSAGE (OUTPATIENT)
Dept: INTERNAL MEDICINE | Facility: CLINIC | Age: 43
End: 2023-12-07
Payer: COMMERCIAL

## 2023-12-08 ENCOUNTER — PATIENT MESSAGE (OUTPATIENT)
Dept: INTERNAL MEDICINE | Facility: CLINIC | Age: 43
End: 2023-12-08
Payer: COMMERCIAL

## 2023-12-11 ENCOUNTER — TELEPHONE (OUTPATIENT)
Dept: PAIN MEDICINE | Facility: CLINIC | Age: 43
End: 2023-12-11

## 2023-12-11 ENCOUNTER — OFFICE VISIT (OUTPATIENT)
Dept: PAIN MEDICINE | Facility: CLINIC | Age: 43
End: 2023-12-11
Payer: COMMERCIAL

## 2023-12-11 DIAGNOSIS — G89.29 CHRONIC PAIN OF BOTH KNEES: ICD-10-CM

## 2023-12-11 DIAGNOSIS — M54.16 LUMBAR RADICULOPATHY: ICD-10-CM

## 2023-12-11 DIAGNOSIS — G89.29 CHRONIC PAIN OF LEFT KNEE: ICD-10-CM

## 2023-12-11 DIAGNOSIS — M79.12 MYALGIA OF AUXILIARY MUSCLES, HEAD AND NECK: ICD-10-CM

## 2023-12-11 DIAGNOSIS — M54.12 CERVICAL RADICULOPATHY: ICD-10-CM

## 2023-12-11 DIAGNOSIS — G89.29 CHRONIC MYOFASCIAL PAIN: ICD-10-CM

## 2023-12-11 DIAGNOSIS — M25.561 CHRONIC PAIN OF BOTH KNEES: ICD-10-CM

## 2023-12-11 DIAGNOSIS — M25.562 CHRONIC PAIN OF LEFT KNEE: ICD-10-CM

## 2023-12-11 DIAGNOSIS — M79.18 CHRONIC MYOFASCIAL PAIN: ICD-10-CM

## 2023-12-11 DIAGNOSIS — M25.562 CHRONIC PAIN OF BOTH KNEES: ICD-10-CM

## 2023-12-11 DIAGNOSIS — M47.816 LUMBAR SPONDYLOSIS: ICD-10-CM

## 2023-12-11 DIAGNOSIS — E66.01 MORBID OBESITY WITH BMI OF 40.0-44.9, ADULT: ICD-10-CM

## 2023-12-11 PROCEDURE — 3051F HG A1C>EQUAL 7.0%<8.0%: CPT | Mod: CPTII,95,, | Performed by: PHYSICAL MEDICINE & REHABILITATION

## 2023-12-11 PROCEDURE — 3051F PR MOST RECENT HEMOGLOBIN A1C LEVEL 7.0 - < 8.0%: ICD-10-PCS | Mod: CPTII,95,, | Performed by: PHYSICAL MEDICINE & REHABILITATION

## 2023-12-11 PROCEDURE — 99214 PR OFFICE/OUTPT VISIT, EST, LEVL IV, 30-39 MIN: ICD-10-PCS | Mod: 95,,, | Performed by: PHYSICAL MEDICINE & REHABILITATION

## 2023-12-11 PROCEDURE — 99214 OFFICE O/P EST MOD 30 MIN: CPT | Mod: 95,,, | Performed by: PHYSICAL MEDICINE & REHABILITATION

## 2023-12-11 RX ORDER — TIZANIDINE 4 MG/1
TABLET ORAL
Qty: 60 TABLET | Refills: 1 | Status: SHIPPED | OUTPATIENT
Start: 2023-12-11

## 2023-12-11 RX ORDER — NAPROXEN 375 MG/1
375 TABLET ORAL 2 TIMES DAILY WITH MEALS
Qty: 45 TABLET | Refills: 2 | Status: SHIPPED | OUTPATIENT
Start: 2023-12-11 | End: 2024-01-29

## 2023-12-11 RX ORDER — PREGABALIN 150 MG/1
150 CAPSULE ORAL NIGHTLY
Qty: 90 CAPSULE | Refills: 3 | Status: SHIPPED | OUTPATIENT
Start: 2023-12-11 | End: 2024-12-05

## 2023-12-11 NOTE — TELEPHONE ENCOUNTER
----- Message from Ricki Noriega MD sent at 12/11/2023 11:40 AM CST -----  Please assist with scheduling three-month follow-up with anyone

## 2023-12-11 NOTE — PROGRESS NOTES
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 evening.  Dispense: 90 capsule; Refill: 3    Chronic pain of both knees  -     tiZANidine (ZANAFLEX) 4 MG tablet; TAKE 1/2 TO 1 TABLET BY MOUTH NIGHTLY AS NEEDED FOR MUSCLE SPASMS. MAY CAUSE DROWSINESS  Dispense: 60 tablet; Refill: 1  -     naproxen (EC-NAPROSYN) 375 MG TbEC EC tablet; Take 1 tablet (375 mg total) by mouth 2 (two) times daily with meals.  Dispense: 45 tablet; Refill: 2  -     pregabalin (LYRICA) 150 MG capsule; Take 1 capsule (150 mg total) by mouth every evening.  Dispense: 90 capsule; Refill: 3    Lumbar spondylosis  -     tiZANidine (ZANAFLEX) 4 MG tablet; TAKE 1/2 TO 1 TABLET BY MOUTH NIGHTLY AS NEEDED FOR MUSCLE SPASMS. MAY CAUSE DROWSINESS  Dispense: 60 tablet; Refill: 1  -     naproxen (EC-NAPROSYN) 375 MG TbEC EC tablet; Take 1 tablet (375 mg total) by mouth 2 (two) times daily with meals.  Dispense: 45 tablet; Refill: 2  -     pregabalin (LYRICA) 150 MG capsule; Take 1 capsule (150 mg total) by mouth every evening.  Dispense: 90 capsule; Refill: 3    Cervical radiculopathy  -     tiZANidine (ZANAFLEX) 4 MG tablet; TAKE 1/2 TO 1 TABLET BY MOUTH NIGHTLY AS NEEDED FOR MUSCLE SPASMS. MAY CAUSE DROWSINESS  Dispense: 60 tablet; Refill: 1  -     naproxen  (EC-NAPROSYN) 375 MG TbEC EC tablet; Take 1 tablet (375 mg total) by mouth 2 (two) times daily with meals.  Dispense: 45 tablet; Refill: 2  -     pregabalin (LYRICA) 150 MG capsule; Take 1 capsule (150 mg total) by mouth every evening.  Dispense: 90 capsule; Refill: 3    Lumbar radiculopathy  -     tiZANidine (ZANAFLEX) 4 MG tablet; TAKE 1/2 TO 1 TABLET BY MOUTH NIGHTLY AS NEEDED FOR MUSCLE SPASMS. MAY CAUSE DROWSINESS  Dispense: 60 tablet; Refill: 1  -     naproxen (EC-NAPROSYN) 375 MG TbEC EC tablet; Take 1 tablet (375 mg total) by mouth 2 (two) times daily with meals.  Dispense: 45 tablet; Refill: 2  -     pregabalin (LYRICA) 150 MG capsule; Take 1 capsule (150 mg total) by mouth every evening.  Dispense: 90 capsule; Refill: 3            PLAN:   - Interventional:  None at this time, could consider performing bilateral knee joint injections with viscosupplementation-patient is also seeing outside Orthopedics for her knees     - Pharmacologic:   -Refill Lyrica to 150 mg nightly  -refill tizanidine 2-4 mg nightly as needed  - reduce naproxen 375 mg b.i.d. p.r.n from 60 tablets per 30 days to 45 tablets.  Advised patient to utilize turmeric for its anti-inflammatory properties and due to its safer side effect profile.  -continue Voltaren gel 4 times a day as needed  -discontinues zonisamide  -continue Tylenol Extra Strength p.r.n.  - Anticoagulation use: None.          - Rehabilitative: Encouraged regular exercise. Continue exercises and activities as tolerated.  Advised patient continue with home exercises, specifically working on SI joint and quad strengthening     - Diagnostic:  Reviewed     - Follow up:  3 months or as needed     - This condition does not require this patient to take time off of work, and the primary goal of our Pain Management services is to improve the patient's functional capacity.      - I discussed the risks, benefits, and alternatives to potential treatment options. All questions and  concerns were fully addressed today in clinic.     The above plan and management options were discussed at length with patient. Patient is in agreement with the above and verbalized understanding.      Ricki Noriega MD  Interventional Pain Management  [facility]    Disclaimer:  This note was prepared using voice recognition system and is likely to have sound alike errors that may have been overlooked even after proof reading.  Please call me with any questions

## 2023-12-18 ENCOUNTER — TELEPHONE (OUTPATIENT)
Dept: INTERNAL MEDICINE | Facility: CLINIC | Age: 43
End: 2023-12-18
Payer: COMMERCIAL

## 2023-12-18 NOTE — TELEPHONE ENCOUNTER
----- Message from Emerson Escalante sent at 12/18/2023 10:24 AM CST -----  Contact: self  Pt is asking for an return call in reference to verifying fax that insurance company sent over was received ,please call back at .[phone] Thx CJ

## 2023-12-19 ENCOUNTER — PATIENT MESSAGE (OUTPATIENT)
Dept: INTERNAL MEDICINE | Facility: CLINIC | Age: 43
End: 2023-12-19
Payer: COMMERCIAL

## 2023-12-19 ENCOUNTER — TELEPHONE (OUTPATIENT)
Dept: INTERNAL MEDICINE | Facility: CLINIC | Age: 43
End: 2023-12-19
Payer: COMMERCIAL

## 2023-12-19 NOTE — TELEPHONE ENCOUNTER
----- Message from Carmenida Maureen sent at 12/19/2023  4:38 PM CST -----  Contact: Allegra  Pt is calling in regards needing a prior authorization for the medication tirzepatide 7.5 mg/0.5 mL PnIj and pt states she has been without the medication for a month.  Please call back with status of fill to    [phone]      [id number] [place] - [address] AT [facility] [address]    Thanks

## 2024-01-22 ENCOUNTER — TELEPHONE (OUTPATIENT)
Dept: PSYCHIATRY | Facility: CLINIC | Age: 44
End: 2024-01-22
Payer: COMMERCIAL

## 2024-01-29 ENCOUNTER — OFFICE VISIT (OUTPATIENT)
Dept: INTERNAL MEDICINE | Facility: CLINIC | Age: 44
End: 2024-01-29
Payer: COMMERCIAL

## 2024-01-29 ENCOUNTER — LAB VISIT (OUTPATIENT)
Dept: LAB | Facility: HOSPITAL | Age: 44
End: 2024-01-29
Attending: FAMILY MEDICINE
Payer: COMMERCIAL

## 2024-01-29 VITALS
HEART RATE: 71 BPM | WEIGHT: 293 LBS | OXYGEN SATURATION: 97 % | TEMPERATURE: 96 F | HEIGHT: 72 IN | DIASTOLIC BLOOD PRESSURE: 80 MMHG | SYSTOLIC BLOOD PRESSURE: 114 MMHG | BODY MASS INDEX: 39.68 KG/M2

## 2024-01-29 DIAGNOSIS — Z11.4 SCREENING FOR HIV (HUMAN IMMUNODEFICIENCY VIRUS): ICD-10-CM

## 2024-01-29 DIAGNOSIS — F32.A ANXIETY AND DEPRESSION: ICD-10-CM

## 2024-01-29 DIAGNOSIS — K21.9 GASTROESOPHAGEAL REFLUX DISEASE WITHOUT ESOPHAGITIS: ICD-10-CM

## 2024-01-29 DIAGNOSIS — F41.9 ANXIETY AND DEPRESSION: ICD-10-CM

## 2024-01-29 DIAGNOSIS — E11.9 NEWLY DIAGNOSED DIABETES: Primary | ICD-10-CM

## 2024-01-29 DIAGNOSIS — R79.89 ELEVATED LIVER FUNCTION TESTS: ICD-10-CM

## 2024-01-29 DIAGNOSIS — E11.9 NEWLY DIAGNOSED DIABETES: ICD-10-CM

## 2024-01-29 DIAGNOSIS — E66.01 MORBID OBESITY WITH BMI OF 40.0-44.9, ADULT: ICD-10-CM

## 2024-01-29 DIAGNOSIS — F33.1 DEPRESSION, MAJOR, RECURRENT, MODERATE: ICD-10-CM

## 2024-01-29 LAB
ALBUMIN SERPL BCP-MCNC: 3.7 G/DL (ref 3.5–5.2)
ALP SERPL-CCNC: 67 U/L (ref 55–135)
ALT SERPL W/O P-5'-P-CCNC: 49 U/L (ref 10–44)
AST SERPL-CCNC: 32 U/L (ref 10–40)
BILIRUB DIRECT SERPL-MCNC: 0.2 MG/DL (ref 0.1–0.3)
BILIRUB SERPL-MCNC: 0.7 MG/DL (ref 0.1–1)
CHOLEST SERPL-MCNC: 161 MG/DL (ref 120–199)
CHOLEST/HDLC SERPL: 5.6 {RATIO} (ref 2–5)
ESTIMATED AVG GLUCOSE: 117 MG/DL (ref 68–131)
HBA1C MFR BLD: 5.7 % (ref 4–5.6)
HDLC SERPL-MCNC: 29 MG/DL (ref 40–75)
HDLC SERPL: 18 % (ref 20–50)
HIV 1+2 AB+HIV1 P24 AG SERPL QL IA: NORMAL
LDLC SERPL CALC-MCNC: 111.4 MG/DL (ref 63–159)
NONHDLC SERPL-MCNC: 132 MG/DL
PROT SERPL-MCNC: 7.1 G/DL (ref 6–8.4)
TRIGL SERPL-MCNC: 103 MG/DL (ref 30–150)

## 2024-01-29 PROCEDURE — 99214 OFFICE O/P EST MOD 30 MIN: CPT | Mod: S$GLB,,, | Performed by: FAMILY MEDICINE

## 2024-01-29 PROCEDURE — 3074F SYST BP LT 130 MM HG: CPT | Mod: CPTII,S$GLB,, | Performed by: FAMILY MEDICINE

## 2024-01-29 PROCEDURE — 36415 COLL VENOUS BLD VENIPUNCTURE: CPT | Performed by: FAMILY MEDICINE

## 2024-01-29 PROCEDURE — 1159F MED LIST DOCD IN RCRD: CPT | Mod: CPTII,S$GLB,, | Performed by: FAMILY MEDICINE

## 2024-01-29 PROCEDURE — 99999 PR PBB SHADOW E&M-EST. PATIENT-LVL III: CPT | Mod: PBBFAC,,, | Performed by: FAMILY MEDICINE

## 2024-01-29 PROCEDURE — 83036 HEMOGLOBIN GLYCOSYLATED A1C: CPT | Performed by: FAMILY MEDICINE

## 2024-01-29 PROCEDURE — 80076 HEPATIC FUNCTION PANEL: CPT | Performed by: FAMILY MEDICINE

## 2024-01-29 PROCEDURE — 87389 HIV-1 AG W/HIV-1&-2 AB AG IA: CPT | Performed by: FAMILY MEDICINE

## 2024-01-29 PROCEDURE — 3008F BODY MASS INDEX DOCD: CPT | Mod: CPTII,S$GLB,, | Performed by: FAMILY MEDICINE

## 2024-01-29 PROCEDURE — 3079F DIAST BP 80-89 MM HG: CPT | Mod: CPTII,S$GLB,, | Performed by: FAMILY MEDICINE

## 2024-01-29 PROCEDURE — 80061 LIPID PANEL: CPT | Performed by: FAMILY MEDICINE

## 2024-01-29 NOTE — PROGRESS NOTES
Subjective:       Patient ID: Brook Burdick is a 43 y.o. female.    Chief Complaint: Follow-up (DM)    Follow-up  Pertinent negatives include no chest pain, chills, congestion, fever or rash.       Patient Active Problem List   Diagnosis    Chronic constipation    Family history of colonic polyps    Insulin resistance    Gastroesophageal reflux disease without esophagitis    Severe obesity (BMI >= 40)    Chronic headaches    Cough    Arthritis of knee    Neck pain    Right arm pain    Decreased ROM of intervertebral discs of cervical spine    Right arm weakness    Anxiety and depression    Bilateral carpal tunnel syndrome    Newly diagnosed diabetes    Depression, major, recurrent, moderate       Past Medical History:   Diagnosis Date    Arthritis of knee 2020    Encounter for blood transfusion     IBS (irritable bowel syndrome)     Liver disease     NALFD    Newly diagnosed diabetes 2024    Obesity     Pancreatitis        Past Surgical History:   Procedure Laterality Date    APPENDECTOMY      BILE DUCT EXPLORATION      CHOLECYSTECTOMY      INJECTION OF JOINT Bilateral 2020    Procedure: Bilateral intraarticular knee injection with local;  Surgeon: Ozzy Sofia MD;  Location: [facility];  Service: Pain Management;  Laterality: Bilateral;       Family History   Problem Relation Age of Onset    Atrial fibrillation Mother     Arthritis Mother     Miscarriages / Stillbirths Mother     Diabetes Father     Hypertension Father     Stroke Father     Diabetes Sister     Hypertension Sister     Hypertension Brother     Diabetes Brother     Cancer Maternal Grandfather     COPD Brother     Diabetes Brother     Hypertension Brother     Diabetes Sister     Miscarriages / Stillbirths Sister        Social History     Tobacco Use   Smoking Status Former    Current packs/day: 0.00    Types: Cigarettes    Quit date: 2022    Years since quittin.8   Smokeless Tobacco Never       Wt Readings from Last 5  Encounters:   01/29/24 (!) 154.9 kg (341 lb 9.6 oz)   11/27/23 (!) 159.5 kg (351 lb 10.1 oz)   10/26/23 (!) 163.3 kg (360 lb)   09/28/23 (!) 163.3 kg (360 lb 1.9 oz)   09/22/23 (!) 162.3 kg (357 lb 12.9 oz)       For further HPI details, see assessment and plan.    Review of Systems   Constitutional:  Negative for chills and fever.   HENT:  Negative for congestion and voice change.    Eyes:  Negative for visual disturbance.   Respiratory:  Negative for shortness of breath.    Cardiovascular:  Negative for chest pain.   Gastrointestinal:  Negative for constipation and diarrhea.        Increased flatulence   Genitourinary:  Negative for difficulty urinating.   Musculoskeletal:  Negative for gait problem.   Skin:  Negative for rash.   Psychiatric/Behavioral:  Negative for dysphoric mood.        Objective:      Vitals:    01/29/24 1315   BP: 114/80   Pulse: 71   Temp: 96 °F (35.6 °C)   Protective Sensation (w/ 10 gram monofilament):  Right: Intact  Left: Intact    Visual Inspection:  Normal -  Bilateral  +callu us  Pedal Pulses:   Right: Present  Left: Present    Posterior Tibialis Pulses:   Right:Diminished  Left: Diminished  Hard to assess    Physical Exam  patient was pleasant and well-groomed.  She seems at her baseline neurologically and psychiatrically.  She has in no acute distress in his regular respiratory rate  Assessment:       1. Newly diagnosed diabetes    2. Depression, major, recurrent, moderate    3. Morbid obesity with BMI of 40.0-44.9, adult    4. Anxiety and depression    5. Gastroesophageal reflux disease without esophagitis        Plan:       Type 2 diabetes  Obesity  I am quite pleased to see she is losing substantial amounts weight at inappropriate rate.  She is currently on Mounjaro 7.5 mg.  She has tolerating the medication well.  We will continue the drug as is presently.  To see her eye exam in early February    Patient did blood work earlier today.  I would like to talk to her about her labs  so we will arrange for a video visit in the next couple of days    Chronic idiopathic constipation  Unfortunately even a low dose of Linzess proved to be too effective.  She discontinued the medication and we will not use it in the future.  I have removed it from her drug list    Major recurrent depression moderate  & anxiety  Patient is on Lexapro as prescribed by Psychiatry Department.  She is doing well.  Continue medication as is    Gastroesophageal reflux disease  Patient has done well for the past couple of months with the Protonix 20.  We will continue the medication as is presently but likely will try to get her off in the future especially with ongoing weight loss     Reviewed patient's medication list.  At present time should be 100% accurate.      Patient declines vaccinations  Close f/u to discuss labs.      This note was verbally dictated, please excuse any type errors.

## 2024-01-31 ENCOUNTER — OFFICE VISIT (OUTPATIENT)
Dept: INTERNAL MEDICINE | Facility: CLINIC | Age: 44
End: 2024-01-31
Payer: COMMERCIAL

## 2024-01-31 DIAGNOSIS — F41.1 GENERALIZED ANXIETY DISORDER: ICD-10-CM

## 2024-01-31 DIAGNOSIS — F33.0 MILD EPISODE OF RECURRENT MAJOR DEPRESSIVE DISORDER: ICD-10-CM

## 2024-01-31 DIAGNOSIS — E78.5 HYPERLIPIDEMIA, UNSPECIFIED HYPERLIPIDEMIA TYPE: ICD-10-CM

## 2024-01-31 DIAGNOSIS — R79.89 ELEVATED LIVER FUNCTION TESTS: ICD-10-CM

## 2024-01-31 DIAGNOSIS — E11.9 NEWLY DIAGNOSED DIABETES: Primary | ICD-10-CM

## 2024-01-31 DIAGNOSIS — E66.01 SEVERE OBESITY (BMI >= 40): ICD-10-CM

## 2024-01-31 PROCEDURE — 3044F HG A1C LEVEL LT 7.0%: CPT | Mod: CPTII,95,, | Performed by: FAMILY MEDICINE

## 2024-01-31 PROCEDURE — 99214 OFFICE O/P EST MOD 30 MIN: CPT | Mod: 95,,, | Performed by: FAMILY MEDICINE

## 2024-01-31 RX ORDER — ATORVASTATIN CALCIUM 20 MG/1
20 TABLET, FILM COATED ORAL DAILY
Qty: 90 TABLET | Refills: 1 | Status: SHIPPED | OUTPATIENT
Start: 2024-01-31 | End: 2024-05-08

## 2024-01-31 NOTE — PROGRESS NOTES
Subjective:       Patient ID: Brook Burdick is a 43 y.o. female.    Chief Complaint: No chief complaint on file.    HPI    The patient location is: home  The chief complaint leading to consultation is: f/u    Visit type: audiovisual    Face to Face time with patient: 10-15   minutes of total time spent on the encounter, which includes face to face time and non-face to face time preparing to see the patient (eg, review of tests), Obtaining and/or reviewing separately obtained history, Documenting clinical information in the electronic or other health record, Independently interpreting results (not separately reported) and communicating results to the patient/family/caregiver, or Care coordination (not separately reported).         Each patient to whom he or she provides medical services by telemedicine is:  (1) informed of the relationship between the physician and patient and the respective role of any other health care provider with respect to management of the patient; and (2) notified that he or she may decline to receive medical services by telemedicine and may withdraw from such care at any time.    Notes:       Patient Active Problem List   Diagnosis    Chronic constipation    Family history of colonic polyps    Insulin resistance    Gastroesophageal reflux disease without esophagitis    Severe obesity (BMI >= 40)    Chronic headaches    Cough    Arthritis of knee    Neck pain    Right arm pain    Decreased ROM of intervertebral discs of cervical spine    Right arm weakness    Anxiety and depression    Bilateral carpal tunnel syndrome    Newly diagnosed diabetes    Depression, major, recurrent, moderate       Past Medical History:   Diagnosis Date    Arthritis of knee 9/30/2020    Encounter for blood transfusion     IBS (irritable bowel syndrome)     Liver disease     NALFD    Newly diagnosed diabetes 1/29/2024    Obesity     Pancreatitis        Past Surgical History:   Procedure Laterality Date     APPENDECTOMY      BILE DUCT EXPLORATION      CHOLECYSTECTOMY      INJECTION OF JOINT Bilateral 2020    Procedure: Bilateral intraarticular knee injection with local;  Surgeon: Ozzy Sofia MD;  Location: [facility];  Service: Pain Management;  Laterality: Bilateral;       Family History   Problem Relation Age of Onset    Atrial fibrillation Mother     Arthritis Mother     Miscarriages / Stillbirths Mother     Diabetes Father     Hypertension Father     Stroke Father     Diabetes Sister     Hypertension Sister     Hypertension Brother     Diabetes Brother     Cancer Maternal Grandfather     COPD Brother     Diabetes Brother     Hypertension Brother     Diabetes Sister     Miscarriages / Stillbirths Sister        Social History     Tobacco Use   Smoking Status Former    Current packs/day: 0.00    Types: Cigarettes    Quit date: 2022    Years since quittin.8   Smokeless Tobacco Never       Wt Readings from Last 5 Encounters:   24 (!) 154.9 kg (341 lb 9.6 oz)   23 (!) 159.5 kg (351 lb 10.1 oz)   10/26/23 (!) 163.3 kg (360 lb)   23 (!) 163.3 kg (360 lb 1.9 oz)   23 (!) 162.3 kg (357 lb 12.9 oz)       For further HPI details, see assessment and plan.    Review of Systems   Constitutional:  Negative for activity change and unexpected weight change.   HENT:  Negative for hearing loss, rhinorrhea and trouble swallowing.    Eyes:  Negative for discharge and visual disturbance.   Respiratory:  Negative for chest tightness and wheezing.    Cardiovascular:  Negative for chest pain and palpitations.   Gastrointestinal:  Negative for blood in stool, constipation, diarrhea and vomiting.   Endocrine: Negative for polydipsia and polyuria.   Genitourinary:  Negative for difficulty urinating, dysuria, hematuria and menstrual problem.   Musculoskeletal:  Negative for arthralgias, joint swelling and neck pain.   Neurological:  Negative for weakness and headaches.   Psychiatric/Behavioral:   Negative for confusion and dysphoric mood.        Objective:      There were no vitals filed for this visit.    Physical Exam  Constitutional:       General: She is not in acute distress.     Appearance: She is not ill-appearing.   Pulmonary:      Effort: Pulmonary effort is normal. No respiratory distress.   Neurological:      General: No focal deficit present.      Mental Status: She is alert.   Psychiatric:         Mood and Affect: Mood normal.         Behavior: Behavior normal.         Assessment:       1. Newly diagnosed diabetes    2. Elevated liver function tests    3. Severe obesity (BMI >= 40)    4. Hyperlipidemia, unspecified hyperlipidemia type        Plan:   Newly diagnosed diabetes  -     Hemoglobin A1C; Future; Expected date: 01/31/2024    Elevated liver function tests  -     Comprehensive Metabolic Panel; Future; Expected date: 01/31/2024    Severe obesity (BMI >= 40)    Hyperlipidemia, unspecified hyperlipidemia type    Other orders  -     atorvastatin (LIPITOR) 20 MG tablet; Take 1 tablet (20 mg total) by mouth once daily.  Dispense: 90 tablet; Refill: 1  -     tirzepatide 10 mg/0.5 mL PnIj; Inject 10 mg into the skin every 7 days.  Dispense: 4 Pen; Refill: 3      Type 2 diabetes  Pleased to see substantial improvement in her A1c.  Now at 5.7.  Continue current treatment planning.  Mounjaro  Patient was tolerated 7.5.  She is interested in increasing the dose to 10 mg.  That is reasonable.  Sending in a three-month supply    Given her diabetic status I would like to see her LDL less than 70.  Her cholesterol isn't terrible but she does have a history of hyperlipidemia.  In an effort to reduce vascular risk I want to initiate her on a statin.  Starting Lipitor at 20 mg.  Monitor for any new aches or pains.  Encouraged vitamin-D supplementation.  We will continue to monitor her lipid panel    Fatty liver deposits   Elevated liver function tests   Her liver function still mildly elevated although does  seem somewhat improved.  We will continue to monitor closely.  I am pleased she is losing weight with Mounjaro.  Strongly encouraged can system exercise.  We will also continue to monitor her liver function panel    Lab to check liver function and A1c in 3 months.  Follow up with me a few days after    This note was verbally dictated, please excuse any type errors.

## 2024-02-02 ENCOUNTER — PATIENT MESSAGE (OUTPATIENT)
Dept: PSYCHIATRY | Facility: CLINIC | Age: 44
End: 2024-02-02
Payer: COMMERCIAL

## 2024-02-02 RX ORDER — ESCITALOPRAM OXALATE 10 MG/1
15 TABLET ORAL DAILY
Qty: 135 TABLET | Refills: 0 | Status: SHIPPED | OUTPATIENT
Start: 2024-02-02 | End: 2024-02-05

## 2024-02-05 DIAGNOSIS — F41.1 GENERALIZED ANXIETY DISORDER: ICD-10-CM

## 2024-02-05 DIAGNOSIS — F33.0 MILD EPISODE OF RECURRENT MAJOR DEPRESSIVE DISORDER: ICD-10-CM

## 2024-02-05 RX ORDER — ESCITALOPRAM OXALATE 20 MG/1
20 TABLET ORAL DAILY
Qty: 90 TABLET | Refills: 0 | Status: SHIPPED | OUTPATIENT
Start: 2024-02-05 | End: 2024-05-08 | Stop reason: SDUPTHER

## 2024-03-20 ENCOUNTER — TELEPHONE (OUTPATIENT)
Dept: PAIN MEDICINE | Facility: CLINIC | Age: 44
End: 2024-03-20
Payer: COMMERCIAL

## 2024-03-20 NOTE — TELEPHONE ENCOUNTER
----- Message from Melinda Chavarria sent at 3/20/2024 12:40 PM CDT -----  Regarding: appt  Name of who is calling:   Allegra      What is the request in detail: Pt is requesting a call back in ref to scheduling an appt or virtual appt due to swelling in both legs and body      Can the clinic reply by [facility]:yes      What number to call back if not [facility]: [phone]

## 2024-03-20 NOTE — TELEPHONE ENCOUNTER
----- Message from Ricki Noriega MD sent at 3/20/2024  3:29 PM CDT -----  Regarding: RE: appt  NSAIDs can cause swelling in the legs and would advise her to abstain from over-the-counter or even prescription NSAIDs at this time.  She could use turmeric as a safe alternative to NSAIDs.  If the swelling in her legs persist, advise her to contact her PCP.  ----- Message -----  From: Fernanda Mcleod, Patient Care Assistant  Sent: 3/20/2024   2:32 PM CDT  To: Ricki Noriega MD  Subject: FW: appt                                         patient states she stopped takingthe NSAIDS provided by you and she has begun to swell she took a ibuprofen last night with some relief but wanted to know if there was any medication she could take on a regular basis for the swelling in legs and body. please advise.  ----- Message -----  From: Melinda Chavarria  Sent: 3/20/2024  12:43 PM CDT  To: Leann Robison Staff  Subject: appt                                             Name of who is calling:   Allegra      What is the request in detail: Pt is requesting a call back in ref to scheduling an appt or virtual appt due to swelling in both legs and body      Can the clinic reply by [facility]:yes      What number to call back if not [facility]: [phone]

## 2024-04-08 ENCOUNTER — PATIENT MESSAGE (OUTPATIENT)
Dept: INTERNAL MEDICINE | Facility: CLINIC | Age: 44
End: 2024-04-08
Payer: COMMERCIAL

## 2024-04-09 RX ORDER — TIRZEPATIDE 5 MG/.5ML
5 INJECTION, SOLUTION SUBCUTANEOUS
Qty: 4 PEN | Refills: 4 | Status: SHIPPED | OUTPATIENT
Start: 2024-04-09 | End: 2024-06-11

## 2024-05-06 ENCOUNTER — PATIENT MESSAGE (OUTPATIENT)
Dept: PSYCHIATRY | Facility: CLINIC | Age: 44
End: 2024-05-06
Payer: COMMERCIAL

## 2024-05-08 ENCOUNTER — OFFICE VISIT (OUTPATIENT)
Dept: PSYCHIATRY | Facility: CLINIC | Age: 44
End: 2024-05-08
Payer: COMMERCIAL

## 2024-05-08 VITALS
SYSTOLIC BLOOD PRESSURE: 138 MMHG | BODY MASS INDEX: 45.21 KG/M2 | DIASTOLIC BLOOD PRESSURE: 81 MMHG | HEART RATE: 69 BPM | WEIGHT: 293 LBS

## 2024-05-08 DIAGNOSIS — F90.2 ATTENTION DEFICIT HYPERACTIVITY DISORDER (ADHD), COMBINED TYPE: Primary | ICD-10-CM

## 2024-05-08 DIAGNOSIS — F41.1 GENERALIZED ANXIETY DISORDER: ICD-10-CM

## 2024-05-08 DIAGNOSIS — F33.42 RECURRENT MAJOR DEPRESSIVE DISORDER, IN FULL REMISSION: ICD-10-CM

## 2024-05-08 PROCEDURE — 90832 PSYTX W PT 30 MINUTES: CPT | Mod: S$GLB,,, | Performed by: PSYCHOLOGIST

## 2024-05-08 PROCEDURE — 1159F MED LIST DOCD IN RCRD: CPT | Mod: CPTII,S$GLB,, | Performed by: PSYCHOLOGIST

## 2024-05-08 PROCEDURE — 90863 PHARMACOLOGIC MGMT W/PSYTX: CPT | Mod: S$GLB,,, | Performed by: PSYCHOLOGIST

## 2024-05-08 PROCEDURE — 3079F DIAST BP 80-89 MM HG: CPT | Mod: CPTII,S$GLB,, | Performed by: PSYCHOLOGIST

## 2024-05-08 PROCEDURE — 99999 PR PBB SHADOW E&M-EST. PATIENT-LVL III: CPT | Mod: PBBFAC,,, | Performed by: PSYCHOLOGIST

## 2024-05-08 PROCEDURE — 3044F HG A1C LEVEL LT 7.0%: CPT | Mod: CPTII,S$GLB,, | Performed by: PSYCHOLOGIST

## 2024-05-08 PROCEDURE — 3075F SYST BP GE 130 - 139MM HG: CPT | Mod: CPTII,S$GLB,, | Performed by: PSYCHOLOGIST

## 2024-05-08 RX ORDER — ESCITALOPRAM OXALATE 20 MG/1
20 TABLET ORAL DAILY
Qty: 90 TABLET | Refills: 3 | Status: SHIPPED | OUTPATIENT
Start: 2024-05-08 | End: 2025-05-08

## 2024-05-08 RX ORDER — LISDEXAMFETAMINE DIMESYLATE 50 MG/1
50 CAPSULE ORAL EVERY MORNING
Qty: 30 CAPSULE | Refills: 0 | Status: SHIPPED | OUTPATIENT
Start: 2024-07-07 | End: 2024-08-06

## 2024-05-08 RX ORDER — LISDEXAMFETAMINE DIMESYLATE 50 MG/1
50 CAPSULE ORAL EVERY MORNING
Qty: 30 CAPSULE | Refills: 0 | Status: SHIPPED | OUTPATIENT
Start: 2024-06-07 | End: 2024-07-07

## 2024-05-08 RX ORDER — LISDEXAMFETAMINE DIMESYLATE 50 MG/1
50 CAPSULE ORAL EVERY MORNING
Qty: 30 CAPSULE | Refills: 0 | Status: SHIPPED | OUTPATIENT
Start: 2024-05-08 | End: 2024-06-07

## 2024-05-08 NOTE — PROGRESS NOTES
Outpatient Psychiatry Follow-Up Visit     5/8/2024      Clinical Status of Patient:  Outpatient (Ambulatory)    Chief Complaint:  Brook Burdick is a 43 y.o. female who presents today for follow-up of depression, anxiety, and inattention/distractibility .      Preferred Name: Allegra  Gender Identity: cis female  Preferred Pronouns: she/her    LAST VISIT: Allegra attended her virtual visit. She is doing better--she is sleeping better. She has not been taking doxepin because she had been taking flexeril and tramadol for a torn miniscus. She has not taken doxepin in over a month. 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 She finishes classes in August; then plans to get a post-masters in forensic psychology certification (will finish that in May of next year); thinking about going back to get her doctorate in clinical psychology with a focus in forensics. She has a lot of test anxiety--has a big test in October. She works for [employer] from home. She is still working at [email or web address]--has anxiety about that sometimes. We may consider using Xanax or propranolol for her test anxiety and will further discuss at our next visit. She does not regularly take Vyvanse. She has a great attitude post-breakup. She is still friendly with her ex's family. She is focused on finishing school. At this time, we agreed to continue Lexapro and Vyvanse--she already has doxepin if needed.    Plan--continue Lexapro 10 mg;  Vyvanse 60 mg (sent 3 scripts); doxepin 10 mg hs prn (rarely takes)     CURRENT PRESENTATION: Allegra attended her visit. She was last seen by me in May of 2023. She finished her masters--she took the LPC exam but did not study and only missed it by a couple of points. She plans to take it again in June. She would like to get back on her stimulant. She has been taking Lexapro--finds it helpful. She lives with her sister and mom--is thinking of going back to school for her doctorate  "and write a dissertation on "raising your parents." She travels a lot with her older sister; she can work from anywhere. She goes places with her mom and sister, with family. She is not dating--talks to some people but nothing serious.    She has not been needing doxepin for sleep. We agreed to restart her Vyvanse at a lower dose. She still has Xanax (for tests) and doxepin if needed for sleep.    She is taking mounjaro--feels better overall and has lost about 40 pounds since November. She loves gardening; does a lot of volunteering with her sister (sister is  of the [employer]). 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        PSYCHOTHERAPY      Site: [facility]  Time: 16 minutes  Participants: Met with patient    Therapeutic Intervention Type: behavior modifying psychotherapy, supportive psychotherapy  Why chosen therapy is appropriate versus another modality: patient responds to this modality, evidence based practice    Target symptoms: distractability, anxiety   Primary focus: see above    Outcome monitoring methods: self-report, observation    Patient's response to intervention:  The patient's response to intervention is accepting.    Progress toward goals:  The patient's progress toward goals is good.        5/7/2024     6:56 AM 5/6/2024     7:57 AM 7/19/2023    10:10 AM   GAD7   1. Feeling nervous, anxious, or on edge? 1 1 2   2. Not being able to stop or control worrying? 0 0 1   3. Worrying too much about different things? 0 0 2   4. Trouble relaxing? 1 1 1   5. Being so restless that it is hard to sit still? 1 1 1   6. Becoming easily annoyed or irritable? 1 1 1   7. Feeling afraid as if something " awful might happen? 0 0 1   KATHLEEN-7 Score 4 4    4 9      0-4 = Minimal anxiety  5-9 = Mild anxiety  10-14 = Moderate anxiety  15-21 = Severe anxiety           1/29/2024     1:18 PM   Depression Patient Health Questionnaire   Over the last two weeks how often have you been bothered by little interest or pleasure in doing things Not at all   Over the last two weeks how often have you been bothered by feeling down, depressed or hopeless Not at all   PHQ-2 Total Score 0     0-4 = No intervention  5 to 9 = Mild  10 to 14 = Moderate  15 to 19 = Moderately severe  =20 = Severe    Review of Systems   PSYCHIATRIC: Pertinant items are noted in the narrative.    Past Medical, Family and Social History: The patient's past medical, family and social history have been reviewed and updated as appropriate within the electronic medical record - see encounter notes.      Current Outpatient Medications:     acetaminophen (TYLENOL) 650 MG TbSR, Take 650 mg by mouth 2 (two) times a day., Disp: , Rfl:     clobetasoL (TEMOVATE) 0.05 % cream, Apply topically 2 (two) times daily. for 7 days, Disp: 15 g, Rfl: 0    EScitalopram oxalate (LEXAPRO) 20 MG tablet, Take 1 tablet (20 mg total) by mouth once daily., Disp: 90 tablet, Rfl: 3    fluocinolone acetonide oiL 0.01 % Drop, Place into both ears 2 (two) times daily., Disp: , Rfl:     lisdexamfetamine (VYVANSE) 50 MG capsule, Take 1 capsule (50 mg total) by mouth every morning., Disp: 30 capsule, Rfl: 0    [START ON 6/7/2024] lisdexamfetamine (VYVANSE) 50 MG capsule, Take 1 capsule (50 mg total) by mouth every morning., Disp: 30 capsule, Rfl: 0    [START ON 7/7/2024] lisdexamfetamine (VYVANSE) 50 MG capsule, Take 1 capsule (50 mg total) by mouth every morning., Disp: 30 capsule, Rfl: 0    meclizine (ANTIVERT) 25 mg tablet, Take 1 tablet (25 mg total) by mouth 3 (three) times daily as needed for Dizziness (dizziness)., Disp: 30 tablet, Rfl: 0    pantoprazole (PROTONIX) 20 MG tablet, Take 1 tablet  (20 mg total) by mouth once daily., Disp: 90 tablet, Rfl: 1    pregabalin (LYRICA) 150 MG capsule, Take 1 capsule (150 mg total) by mouth every evening., Disp: 90 capsule, Rfl: 3    tirzepatide (MOUNJARO) 5 mg/0.5 mL PnIj, Inject 5 mg into the skin every 7 days., Disp: 4 Pen, Rfl: 4    tiZANidine (ZANAFLEX) 4 MG tablet, TAKE 1/2 TO 1 TABLET BY MOUTH NIGHTLY AS NEEDED FOR MUSCLE SPASMS. MAY CAUSE DROWSINESS, Disp: 60 tablet, Rfl: 1    Compliance: no    Side effects: see above    Risk Parameters:  Patient reports no suicidal ideation  Patient reports no homicidal ideation  Patient reports no self-injurious behavior  Patient reports no violent behavior    Exam (detailed: at least 9 elements; comprehensive: all 15 elements)   Constitutional  Vitals:  Most recent vital signs were reviewed.   Last 3 sets of Vitals        11/27/2023     3:19 PM 1/29/2024     1:15 PM 5/8/2024     4:39 PM   Vitals - 1 value per visit   SYSTOLIC 138 114 138   DIASTOLIC 96 80 81   Pulse 118 71 69   Temp 96 °F (35.6 °C) 96 °F (35.6 °C)    SPO2 97 % 97 %    Weight (lb) 351.63 341.6 333.34   Weight (kg) 159.5 154.95 151.2   Height 6' (1.829 m) 6' (1.829 m)    BMI (Calculated) 47.7 46.3    Pain Score Zero            General:  age appropriate, casually dressed, neatly groomed, hair pulled back; nose stud ring     Musculoskeletal  Muscle Strength/Tone:  no tremor, no tic   Gait & Station:  non-ataxic     Psychiatric  Speech:  no latency; no press   Behavior: wnl   Mood & Affect:  euthymic  congruent and appropriate   Thought Process:  normal and logical   Associations:  intact   Thought Content:  normal, no suicidality, no homicidality, delusions, or paranoia   Insight:  has awareness of illness   Judgement: behavior is adequate to circumstances   Orientation:  grossly intact   Memory: intact for content of interview   Language: grossly intact   Attention Span & Concentration:  Grossly intact   Fund of Knowledge:  intact and appropriate to age and  level of education     Assessment and Diagnosis   Status/Progress: Based on the examination today, the patient's problem(s) is/are adequately but not ideally controlled.  New problems have not been presented today.   Co-morbidities and psychosocial stressors  are complicating management of the primary condition.  There are no active rule-out diagnoses for this patient at this time.     General Impression:     Encounter Diagnoses   Name Primary?    Attention deficit hyperactivity disorder (ADHD), combined type Yes    Generalized anxiety disorder     Recurrent major depressive disorder, in full remission        Intervention/Counseling/Treatment Plan   Medication Management: Discussed risks, benefits, and alternatives to treatment plan documented above with patient. I answered all patient questions related to this plan, and patient expressed understanding and agreement.   continue Lexapro 20 mg;  restart Vyvanse 50 mg (sent 3 scripts)  counseling    Return to Clinic: 3 months    Medication List with Changes/Refills   New Medications    LISDEXAMFETAMINE (VYVANSE) 50 MG CAPSULE    Take 1 capsule (50 mg total) by mouth every morning.    LISDEXAMFETAMINE (VYVANSE) 50 MG CAPSULE    Take 1 capsule (50 mg total) by mouth every morning.    LISDEXAMFETAMINE (VYVANSE) 50 MG CAPSULE    Take 1 capsule (50 mg total) by mouth every morning.   Current Medications    ACETAMINOPHEN (TYLENOL) 650 MG TBSR    Take 650 mg by mouth 2 (two) times a day.    CLOBETASOL (TEMOVATE) 0.05 % CREAM    Apply topically 2 (two) times daily. for 7 days    FLUOCINOLONE ACETONIDE OIL 0.01 % DROP    Place into both ears 2 (two) times daily.    MECLIZINE (ANTIVERT) 25 MG TABLET    Take 1 tablet (25 mg total) by mouth 3 (three) times daily as needed for Dizziness (dizziness).    PANTOPRAZOLE (PROTONIX) 20 MG TABLET    Take 1 tablet (20 mg total) by mouth once daily.    PREGABALIN (LYRICA) 150 MG CAPSULE    Take 1 capsule (150 mg total) by mouth every  evening.    TIRZEPATIDE (MOUNJARO) 5 MG/0.5 ML PNIJ    Inject 5 mg into the skin every 7 days.    TIZANIDINE (ZANAFLEX) 4 MG TABLET    TAKE 1/2 TO 1 TABLET BY MOUTH NIGHTLY AS NEEDED FOR MUSCLE SPASMS. MAY CAUSE DROWSINESS   Changed and/or Refilled Medications    Modified Medication Previous Medication    ESCITALOPRAM OXALATE (LEXAPRO) 20 MG TABLET EScitalopram oxalate (LEXAPRO) 20 MG tablet       Take 1 tablet (20 mg total) by mouth once daily.    Take 1 tablet (20 mg total) by mouth once daily.   Discontinued Medications    ATORVASTATIN (LIPITOR) 20 MG TABLET    Take 1 tablet (20 mg total) by mouth once daily.    LISDEXAMFETAMINE (VYVANSE) 60 MG CAPSULE    Take 1 capsule (60 mg total) by mouth every morning.    LISDEXAMFETAMINE (VYVANSE) 60 MG CAPSULE    Take 1 capsule (60 mg total) by mouth every morning.    LISDEXAMFETAMINE (VYVANSE) 60 MG CAPSULE    Take 1 capsule (60 mg total) by mouth every morning.    LISDEXAMFETAMINE (VYVANSE) 60 MG CAPSULE    Take 1 capsule (60 mg total) by mouth every morning.    LISDEXAMFETAMINE (VYVANSE) 60 MG CAPSULE    Take 1 capsule (60 mg total) by mouth every morning.        I spent an additional 13 minutes performing E/M services with >50% spent on counseling, guidance, coordinating care (not Psychotherapy related) in addition to the 16 minutes performing Psychotherapy.    Time spent with pt including note preparation: 29 minutes     Chasidy Anthony, PhD, MP  Advanced Practice Medical Psychologist  [facility]--[address]  [place] [id number]  [phone]   [phone] fax

## 2024-05-08 NOTE — PATIENT INSTRUCTIONS

## 2024-06-03 NOTE — TELEPHONE ENCOUNTER
No care due was identified.  [facility] Embedded Care Due Messages. Reference number: [id number].   6/03/2024 3:38:25 PM CDT

## 2024-06-03 NOTE — TELEPHONE ENCOUNTER
Care Due:                  Date            Visit Type   Department     Provider  --------------------------------------------------------------------------------                                ESTABLISHED                              PATIENT -    [facility] INTER[facility]  Last Visit: 01-      [facility]      MEDICINE       Yoan Henry                              EP -                              PRIMARY      [facility] INTER[facility]  Next Visit: 08-      CARE ([facility])   MEDICINE       Yoan Henry                                                            Last  Test          Frequency    Reason                     Performed    Due Date  --------------------------------------------------------------------------------    HBA1C.......  6 months...  tirzepatide..............  01- 07-    [employer] Embedded Care Due Messages. Reference number: [id number].   6/03/2024 3:36:04 PM CDT

## 2024-06-04 ENCOUNTER — PATIENT MESSAGE (OUTPATIENT)
Dept: INTERNAL MEDICINE | Facility: CLINIC | Age: 44
End: 2024-06-04
Payer: COMMERCIAL

## 2024-06-04 RX ORDER — PANTOPRAZOLE SODIUM 20 MG/1
20 TABLET, DELAYED RELEASE ORAL
Qty: 90 TABLET | Refills: 1 | OUTPATIENT
Start: 2024-06-04

## 2024-06-04 NOTE — TELEPHONE ENCOUNTER
Refill Decision Note   Brook Burdick  is requesting a refill authorization.  Brief Assessment and Rationale for Refill:  Quick Discontinue     Medication Therapy Plan:  Duplicate      Comments:     Note composed:3:09 AM 06/04/2024

## 2024-06-04 NOTE — TELEPHONE ENCOUNTER
No care due was identified.  [facility] Embedded Care Due Messages. Reference number: [id number].   6/04/2024 9:31:39 AM CDT

## 2024-06-05 ENCOUNTER — OFFICE VISIT (OUTPATIENT)
Dept: OPHTHALMOLOGY | Facility: CLINIC | Age: 44
End: 2024-06-05
Payer: COMMERCIAL

## 2024-06-05 ENCOUNTER — PATIENT MESSAGE (OUTPATIENT)
Dept: OPHTHALMOLOGY | Facility: CLINIC | Age: 44
End: 2024-06-05

## 2024-06-05 ENCOUNTER — LAB VISIT (OUTPATIENT)
Dept: LAB | Facility: HOSPITAL | Age: 44
End: 2024-06-05
Attending: FAMILY MEDICINE
Payer: COMMERCIAL

## 2024-06-05 DIAGNOSIS — E11.9 NEWLY DIAGNOSED DIABETES: ICD-10-CM

## 2024-06-05 DIAGNOSIS — E11.9 DIABETES MELLITUS TYPE 2 WITHOUT RETINOPATHY: Primary | ICD-10-CM

## 2024-06-05 DIAGNOSIS — H52.7 REFRACTIVE ERRORS: ICD-10-CM

## 2024-06-05 DIAGNOSIS — R79.89 ELEVATED LIVER FUNCTION TESTS: ICD-10-CM

## 2024-06-05 LAB
ALBUMIN SERPL BCP-MCNC: 3.6 G/DL (ref 3.5–5.2)
ALP SERPL-CCNC: 77 U/L (ref 55–135)
ALT SERPL W/O P-5'-P-CCNC: 51 U/L (ref 10–44)
ANION GAP SERPL CALC-SCNC: 5 MMOL/L (ref 8–16)
AST SERPL-CCNC: 37 U/L (ref 10–40)
BILIRUB SERPL-MCNC: 0.7 MG/DL (ref 0.1–1)
BUN SERPL-MCNC: 11 MG/DL (ref 6–20)
CALCIUM SERPL-MCNC: 10 MG/DL (ref 8.7–10.5)
CHLORIDE SERPL-SCNC: 108 MMOL/L (ref 95–110)
CO2 SERPL-SCNC: 24 MMOL/L (ref 23–29)
CREAT SERPL-MCNC: 0.7 MG/DL (ref 0.5–1.4)
EST. GFR  (NO RACE VARIABLE): >60 ML/MIN/1.73 M^2
ESTIMATED AVG GLUCOSE: 108 MG/DL (ref 68–131)
GLUCOSE SERPL-MCNC: 97 MG/DL (ref 70–110)
HBA1C MFR BLD: 5.4 % (ref 4–5.6)
POTASSIUM SERPL-SCNC: 4.5 MMOL/L (ref 3.5–5.1)
PROT SERPL-MCNC: 7.3 G/DL (ref 6–8.4)
SODIUM SERPL-SCNC: 137 MMOL/L (ref 136–145)

## 2024-06-05 PROCEDURE — 1159F MED LIST DOCD IN RCRD: CPT | Mod: CPTII,S$GLB,, | Performed by: OPTOMETRIST

## 2024-06-05 PROCEDURE — 99999 PR PBB SHADOW E&M-EST. PATIENT-LVL III: CPT | Mod: PBBFAC,,, | Performed by: OPTOMETRIST

## 2024-06-05 PROCEDURE — 83036 HEMOGLOBIN GLYCOSYLATED A1C: CPT | Performed by: FAMILY MEDICINE

## 2024-06-05 PROCEDURE — 3044F HG A1C LEVEL LT 7.0%: CPT | Mod: CPTII,S$GLB,, | Performed by: OPTOMETRIST

## 2024-06-05 PROCEDURE — 2023F DILAT RTA XM W/O RTNOPTHY: CPT | Mod: CPTII,S$GLB,, | Performed by: OPTOMETRIST

## 2024-06-05 PROCEDURE — 92015 DETERMINE REFRACTIVE STATE: CPT | Mod: S$GLB,,, | Performed by: OPTOMETRIST

## 2024-06-05 PROCEDURE — 92014 COMPRE OPH EXAM EST PT 1/>: CPT | Mod: S$GLB,,, | Performed by: OPTOMETRIST

## 2024-06-05 PROCEDURE — 36415 COLL VENOUS BLD VENIPUNCTURE: CPT | Performed by: FAMILY MEDICINE

## 2024-06-05 PROCEDURE — 80053 COMPREHEN METABOLIC PANEL: CPT | Performed by: FAMILY MEDICINE

## 2024-06-05 RX ORDER — PANTOPRAZOLE SODIUM 20 MG/1
20 TABLET, DELAYED RELEASE ORAL DAILY
Qty: 90 TABLET | Refills: 1 | Status: SHIPPED | OUTPATIENT
Start: 2024-06-05 | End: 2025-06-05

## 2024-06-05 NOTE — PROGRESS NOTES
SUBJECTIVE  Brook Burdick is 43 y.o. female  Uncorrected distance visual acuity was 20/25 -1 in the right eye and 20/25 -1 in the left eye. Uncorrected near visual acuity was J1 in the right eye and J1 in the left eye.   Chief Complaint   Patient presents with    Diabetic Eye Exam    Eye Exam    Blurred Vision          HPI    NIDDM exam  Blurred vision x 6 months  Last eye visit 01/18/2023 [facility].  Last eye exam not sure  Update glasses RX.  Lab Results       Component                Value               Date                       LABA1C                   5.5                 04/20/2018                 HGBA1C                   5.7 (H)             01/29/2024              Last edited by Madelin العلي MA on 6/5/2024  9:41 AM.         Assessment /Plan :  1. Diabetes mellitus type 2 without retinopathy   No Background Diabetic Retinopathy  Strict BG control, f/u w/ PCP, and annual DFE  Stressed importance of DM control to preserve vision      2. Refractive errors   Dispense Final Rx for glasses.  RTC 1 year  Discussed above and answered questions.

## 2024-06-11 ENCOUNTER — OFFICE VISIT (OUTPATIENT)
Dept: INTERNAL MEDICINE | Facility: CLINIC | Age: 44
End: 2024-06-11
Payer: COMMERCIAL

## 2024-06-11 ENCOUNTER — TELEPHONE (OUTPATIENT)
Dept: SLEEP MEDICINE | Facility: CLINIC | Age: 44
End: 2024-06-11
Payer: COMMERCIAL

## 2024-06-11 VITALS
TEMPERATURE: 99 F | OXYGEN SATURATION: 96 % | HEART RATE: 75 BPM | BODY MASS INDEX: 43.26 KG/M2 | WEIGHT: 293 LBS | RESPIRATION RATE: 18 BRPM | SYSTOLIC BLOOD PRESSURE: 128 MMHG | DIASTOLIC BLOOD PRESSURE: 86 MMHG

## 2024-06-11 DIAGNOSIS — G89.29 OTHER CHRONIC PAIN: ICD-10-CM

## 2024-06-11 DIAGNOSIS — R06.83 SNORES: Primary | ICD-10-CM

## 2024-06-11 DIAGNOSIS — R29.818 SUSPECTED SLEEP APNEA: ICD-10-CM

## 2024-06-11 DIAGNOSIS — E66.01 SEVERE OBESITY (BMI >= 40): ICD-10-CM

## 2024-06-11 DIAGNOSIS — E78.5 HYPERLIPIDEMIA, UNSPECIFIED HYPERLIPIDEMIA TYPE: ICD-10-CM

## 2024-06-11 DIAGNOSIS — R53.83 FATIGUE, UNSPECIFIED TYPE: ICD-10-CM

## 2024-06-11 DIAGNOSIS — K21.9 GASTROESOPHAGEAL REFLUX DISEASE WITHOUT ESOPHAGITIS: ICD-10-CM

## 2024-06-11 DIAGNOSIS — E11.8 CONTROLLED TYPE 2 DIABETES MELLITUS WITH COMPLICATION, WITHOUT LONG-TERM CURRENT USE OF INSULIN: ICD-10-CM

## 2024-06-11 DIAGNOSIS — F32.A ANXIETY AND DEPRESSION: ICD-10-CM

## 2024-06-11 DIAGNOSIS — R79.89 ELEVATED LIVER FUNCTION TESTS: ICD-10-CM

## 2024-06-11 DIAGNOSIS — Z79.899 ENCOUNTER FOR LONG-TERM (CURRENT) USE OF MEDICATIONS: ICD-10-CM

## 2024-06-11 DIAGNOSIS — N91.2 AMENORRHEA: ICD-10-CM

## 2024-06-11 DIAGNOSIS — F33.1 DEPRESSION, MAJOR, RECURRENT, MODERATE: ICD-10-CM

## 2024-06-11 DIAGNOSIS — E28.2 PCOS (POLYCYSTIC OVARIAN SYNDROME): ICD-10-CM

## 2024-06-11 DIAGNOSIS — F41.9 ANXIETY AND DEPRESSION: ICD-10-CM

## 2024-06-11 PROCEDURE — 1159F MED LIST DOCD IN RCRD: CPT | Mod: CPTII,S$GLB,, | Performed by: FAMILY MEDICINE

## 2024-06-11 PROCEDURE — 99214 OFFICE O/P EST MOD 30 MIN: CPT | Mod: S$GLB,,, | Performed by: FAMILY MEDICINE

## 2024-06-11 PROCEDURE — 99999 PR PBB SHADOW E&M-EST. PATIENT-LVL IV: CPT | Mod: PBBFAC,,, | Performed by: FAMILY MEDICINE

## 2024-06-11 PROCEDURE — 3074F SYST BP LT 130 MM HG: CPT | Mod: CPTII,S$GLB,, | Performed by: FAMILY MEDICINE

## 2024-06-11 PROCEDURE — 3008F BODY MASS INDEX DOCD: CPT | Mod: CPTII,S$GLB,, | Performed by: FAMILY MEDICINE

## 2024-06-11 PROCEDURE — 3079F DIAST BP 80-89 MM HG: CPT | Mod: CPTII,S$GLB,, | Performed by: FAMILY MEDICINE

## 2024-06-11 PROCEDURE — 3044F HG A1C LEVEL LT 7.0%: CPT | Mod: CPTII,S$GLB,, | Performed by: FAMILY MEDICINE

## 2024-06-11 PROCEDURE — G2211 COMPLEX E/M VISIT ADD ON: HCPCS | Mod: S$GLB,,, | Performed by: FAMILY MEDICINE

## 2024-06-11 RX ORDER — TIRZEPATIDE 12.5 MG/.5ML
12.5 INJECTION, SOLUTION SUBCUTANEOUS
Qty: 4 PEN | Refills: 5 | Status: SHIPPED | OUTPATIENT
Start: 2024-06-11 | End: 2024-06-11 | Stop reason: SDUPTHER

## 2024-06-11 RX ORDER — TIRZEPATIDE 12.5 MG/.5ML
12.5 INJECTION, SOLUTION SUBCUTANEOUS
Qty: 2 PEN | Refills: 5 | Status: SHIPPED | OUTPATIENT
Start: 2024-06-11

## 2024-06-11 RX ORDER — ATORVASTATIN CALCIUM 20 MG/1
20 TABLET, FILM COATED ORAL DAILY
COMMUNITY

## 2024-06-11 NOTE — TELEPHONE ENCOUNTER
Sent in 4 pens. Needs to be 2 pens since 2 weeks come out of one pen. They will not cover 4 pens. I have fixed it. Please re-sign.

## 2024-06-11 NOTE — TELEPHONE ENCOUNTER
----- Message from Sheldon Ralph sent at 6/11/2024  1:30 PM CDT -----  [facility]

## 2024-06-11 NOTE — PROGRESS NOTES
Subjective:       Patient ID: Brook Burdick is a 43 y.o. female.    Chief Complaint: chronic condition follow    HPI    Patient Active Problem List   Diagnosis    Chronic constipation    Family history of colonic polyps    Insulin resistance    Gastroesophageal reflux disease without esophagitis    Severe obesity (BMI >= 40)    Chronic headaches    Cough    Arthritis of knee    Neck pain    Right arm pain    Decreased ROM of intervertebral discs of cervical spine    Right arm weakness    Anxiety and depression    Bilateral carpal tunnel syndrome    Newly diagnosed diabetes    Depression, major, recurrent, moderate    PCOS (polycystic ovarian syndrome)       Past Medical History:   Diagnosis Date    Arthritis of knee 9/30/2020    Encounter for blood transfusion     IBS (irritable bowel syndrome)     Liver disease     NALFD    Newly diagnosed diabetes 1/29/2024    Obesity     Pancreatitis        Past Surgical History:   Procedure Laterality Date    APPENDECTOMY      BILE DUCT EXPLORATION      CHOLECYSTECTOMY      INJECTION OF JOINT Bilateral 09/30/2020    Procedure: Bilateral intraarticular knee injection with local;  Surgeon: Ozzy Sofia MD;  Location: [facility];  Service: Pain Management;  Laterality: Bilateral;       Family History   Problem Relation Name Age of Onset    Cataracts Mother Karyn Burdick     Atrial fibrillation Mother Karyn Burdick     Arthritis Mother Karyn Burdick     Miscarriages / Stillbirths Mother Karyn Burdick     Diabetes Father Mau Burdick     Hypertension Father Mau Burdick     Stroke Father Mau Burdick     Diabetes Sister      Hypertension Sister      Diabetes Sister Nytoya Martinezman     Miscarriages / Stillbirths Sister Diane Alonzo     Hypertension Brother      Diabetes Brother      COPD Brother Mau Burdick Jr     Diabetes Brother Mau Burdick Jr     Hypertension Brother Mau Burdick Jr     Cancer Maternal Grandfather Thad Tomas     Hypertension Sister Diane  Puja     Miscarriages / Stillbirths Sister Diane Luo        Social History     Tobacco Use   Smoking Status Some Days    Types: Vaping w/o nicotine    Passive exposure: Never   Smokeless Tobacco Never       Wt Readings from Last 5 Encounters:   06/11/24 (!) 144.7 kg (319 lb)   05/08/24 (!) 151.2 kg (333 lb 5.4 oz)   01/29/24 (!) 154.9 kg (341 lb 9.6 oz)   11/27/23 (!) 159.5 kg (351 lb 10.1 oz)   10/26/23 (!) 163.3 kg (360 lb)       For further HPI details, see assessment and plan.    Review of Systems    Objective:      Vitals:    06/11/24 1131   BP: 128/86   Pulse: 75   Resp: 18   Temp: 98.5 °F (36.9 °C)       Physical Exam  Constitutional:       General: She is not in acute distress.     Appearance: Normal appearance. She is obese. She is not ill-appearing.   Pulmonary:      Effort: Pulmonary effort is normal. No respiratory distress.   Neurological:      General: No focal deficit present.      Mental Status: She is alert.   Psychiatric:         Mood and Affect: Mood normal.         Behavior: Behavior normal.         Assessment:       1. Snores    2. Controlled type 2 diabetes mellitus with complication, without long-term current use of insulin    3. Elevated liver function tests    4. Severe obesity (BMI >= 40)    5. Depression, major, recurrent, moderate    6. Hyperlipidemia, unspecified hyperlipidemia type    7. Anxiety and depression    8. Fatigue, unspecified type    9. Suspected sleep apnea    10. Gastroesophageal reflux disease without esophagitis    11. Encounter for long-term (current) use of medications    12. Amenorrhea    13. Other chronic pain    14. PCOS (polycystic ovarian syndrome)        Plan:   F/u on chronic issues:        STOP - BANG Questionnaire:     1. Snoring : Do you snore loudly ?    yes    2. Tired : Do you often feel tired, fatigued, or sleepy during daytime? yes    3. Observed: Has anyone observed you stop breathing during your sleep?   no    4. Blood pressure :  Do you have or are you being treated for high blood pressure?   no    5. BMI :BMI more than 35 kg/m2?   yes    6. Age : Age over 50 yr old?   no    7. Neck circumference: Neck circumference greater than 40 cm?   yes    8. Gender: Gender male?   no    High risk of STACI: Yes 5 - 8  Intermediate risk of STACI: Yes 3 - 4  Low risk of STACI: Yes 0 - 2      References:   STOP Questionnaire   A Tool to Screen Patients for Obstructive Sleep Apnea: ROSA MARIA Mayo.P.C., RICHARD Bedolla.B.B.S., Darcie Young M.D.,Mirtha Rodriguez, Ph.D., RICHARD Woody.B.B.S.,_ Seth Soriano.,_ Eris Padron M.D., NELLI Weeks.RAHUL.C.P.C.; Anesthesiology 2008; 108:812-21 Copyright © 2008, the American Society of Anesthesiologists, Inc. [employer]      Chronic fatigue   Strongly suspect sleep apnea.  I am placing orders for a sleep disorders consultation and home sleep study.  I have advise patient to expect a telephone call for both orders to be arranged.    Type 2 diabetes   Morbid obesity   Patient was doing very well with Mounjaro.  At present time she is at 10 mg.  She wants to increase the dose.  I am upset to hear she has not exercising sufficiently.  Discussed that part of her prescription for Mounjaro comes with the risk of loss of muscle mass.  It is vitals important she maintains this via physical activity especially with resistance training.    Fatty liver disease   Mild elevation in ALT.  We will continue to monitor.  Continue weight loss efforts.      Hyperlipidemia   Patient has been prescribed statin but she has not taking it.  Her cholesterol is not terrible but I do want improvements.  Goal LDL is less than 70.  I want her to start Lipitor 20 mg.  We will continue to monitor liver function.  If her LDL is not reaching goal we will further titrate medication dosage    Gastroesophageal reflux disease   Controlled with Protonix 20 mg.  Continue medication  Medication was started  on November 27, 2023.  Patient has had an upper endoscopy in the remote past.  We may want to reconsider doing this again.  I would like to monitor B12 and magnesium given long-term use of proton pump inhibitor and eventually I do want to wean down off this medication.    Anxiety and depression and ADHD   Working with Psychiatry   Doing well with Lexapro and Vyvanse.  Continue medication     History of vertigo   Issues resolved   No longer using meclizine.  Removed from drug list    Chronic pain   Chronic myofascial pain, chronic pain of both knees, lumbar spondylosis, lumbar and cervical radiculopathy.  Patient was working with the pain management department.  She seems to be doing well with Lyrica and her muscle relaxant as per her pain physician.  Continue medications as is    Amenorrhea   Patient was had a menstrual cycle in about 5 years.  She inquires about getting a hysterectomy.  Strongly encouraged she discuss with her gynecologist.  [place].    Lab in 6 month  F/u me   This note was verbally dictated, please excuse any type errors.

## 2024-06-11 NOTE — TELEPHONE ENCOUNTER
No care due was identified.  [facility] Embedded Care Due Messages. Reference number: [id number].   6/11/2024 2:27:16 PM CDT

## 2024-07-01 ENCOUNTER — PATIENT MESSAGE (OUTPATIENT)
Dept: PAIN MEDICINE | Facility: CLINIC | Age: 44
End: 2024-07-01
Payer: COMMERCIAL

## 2024-07-03 ENCOUNTER — OFFICE VISIT (OUTPATIENT)
Dept: PAIN MEDICINE | Facility: CLINIC | Age: 44
End: 2024-07-03
Payer: COMMERCIAL

## 2024-07-03 DIAGNOSIS — G89.29 CHRONIC PAIN OF BOTH KNEES: ICD-10-CM

## 2024-07-03 DIAGNOSIS — E66.01 MORBID OBESITY WITH BMI OF 40.0-44.9, ADULT: ICD-10-CM

## 2024-07-03 DIAGNOSIS — M25.562 CHRONIC PAIN OF BOTH KNEES: ICD-10-CM

## 2024-07-03 DIAGNOSIS — G89.29 CHRONIC PAIN OF LEFT KNEE: ICD-10-CM

## 2024-07-03 DIAGNOSIS — M47.816 LUMBAR SPONDYLOSIS: ICD-10-CM

## 2024-07-03 DIAGNOSIS — G89.29 CHRONIC MYOFASCIAL PAIN: ICD-10-CM

## 2024-07-03 DIAGNOSIS — M79.18 CHRONIC MYOFASCIAL PAIN: ICD-10-CM

## 2024-07-03 DIAGNOSIS — M25.561 CHRONIC PAIN OF BOTH KNEES: ICD-10-CM

## 2024-07-03 DIAGNOSIS — M79.12 MYALGIA OF AUXILIARY MUSCLES, HEAD AND NECK: ICD-10-CM

## 2024-07-03 DIAGNOSIS — M54.12 CERVICAL RADICULOPATHY: ICD-10-CM

## 2024-07-03 DIAGNOSIS — M54.16 LUMBAR RADICULOPATHY: ICD-10-CM

## 2024-07-03 DIAGNOSIS — M25.562 CHRONIC PAIN OF LEFT KNEE: ICD-10-CM

## 2024-07-03 PROCEDURE — 99214 OFFICE O/P EST MOD 30 MIN: CPT | Mod: 95,,, | Performed by: PHYSICAL MEDICINE & REHABILITATION

## 2024-07-03 PROCEDURE — 3044F HG A1C LEVEL LT 7.0%: CPT | Mod: CPTII,95,, | Performed by: PHYSICAL MEDICINE & REHABILITATION

## 2024-07-03 RX ORDER — PREGABALIN 150 MG/1
150 CAPSULE ORAL NIGHTLY
Qty: 90 CAPSULE | Refills: 3 | Status: SHIPPED | OUTPATIENT
Start: 2024-07-03 | End: 2025-06-28

## 2024-07-03 RX ORDER — TIZANIDINE 4 MG/1
TABLET ORAL
Qty: 90 TABLET | Refills: 1 | Status: SHIPPED | OUTPATIENT
Start: 2024-07-03

## 2024-07-03 NOTE — PROGRESS NOTES
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     pregabalin (LYRICA) 150 MG capsule; Take 1 capsule (150 mg total) by mouth every evening.  Dispense: 90 capsule; Refill: 3  -     tiZANidine (ZANAFLEX) 4 MG tablet; TAKE 1/2 TO 1 TABLET BY MOUTH NIGHTLY AS NEEDED FOR MUSCLE SPASMS. MAY CAUSE DROWSINESS  Dispense: 90 tablet; Refill: 1    Lumbar spondylosis  -     pregabalin (LYRICA) 150 MG capsule; Take 1 capsule (150 mg total) by mouth every evening.  Dispense: 90 capsule; Refill: 3  -     tiZANidine (ZANAFLEX) 4 MG tablet; TAKE 1/2 TO 1 TABLET BY MOUTH NIGHTLY AS NEEDED FOR MUSCLE SPASMS. MAY CAUSE DROWSINESS  Dispense: 90 tablet; Refill: 1    Cervical radiculopathy  -     pregabalin (LYRICA) 150 MG capsule; Take 1 capsule (150 mg total) by mouth every evening.  Dispense: 90 capsule; Refill: 3  -     tiZANidine (ZANAFLEX) 4 MG tablet; TAKE 1/2 TO 1 TABLET BY MOUTH NIGHTLY AS NEEDED FOR MUSCLE SPASMS. MAY CAUSE DROWSINESS  Dispense: 90 tablet; Refill: 1    Lumbar radiculopathy  -     pregabalin (LYRICA) 150 MG capsule; Take 1 capsule (150 mg total) by mouth every evening.  Dispense: 90 capsule; Refill: 3  -     tiZANidine (ZANAFLEX) 4 MG tablet; TAKE 1/2 TO 1 TABLET BY MOUTH NIGHTLY AS NEEDED FOR MUSCLE SPASMS. MAY CAUSE  DROWSINESS  Dispense: 90 tablet; Refill: 1              PLAN:   - Interventional:  None at this time, could consider performing bilateral knee joint injections with viscosupplementation-patient is also seeing outside Orthopedics for her knees     - Pharmacologic:   -Refill Lyrica 150 mg nightly  -refill tizanidine 2-4 mg nightly as needed  - Advised patient to utilize turmeric for its anti-inflammatory properties and due to its safer side effect profile.  -continue Voltaren gel 4 times a day as needed  -  Previously discontinued zonisamide  -continue Tylenol Extra Strength p.r.n.  - Anticoagulation use: None.          - Rehabilitative: Encouraged regular exercise. Continue exercises and activities as tolerated.  Advised patient continue with home exercises, specifically working on SI joint and quad strengthening     - Diagnostic:  Reviewed     - Follow up:   4-6 months or as needed     - This condition does not require this patient to take time off of work, and the primary goal of our Pain Management services is to improve the patient's functional capacity.      - I discussed the risks, benefits, and alternatives to potential treatment options. All questions and concerns were fully addressed today in clinic.     The above plan and management options were discussed at length with patient. Patient is in agreement with the above and verbalized understanding.      Ricki Noriega MD  Interventional Pain Management  [facility]    Disclaimer:  This note was prepared using voice recognition system and is likely to have sound alike errors that may have been overlooked even after proof reading.  Please call me with any questions

## 2024-07-30 ENCOUNTER — HOSPITAL ENCOUNTER (OUTPATIENT)
Dept: SLEEP MEDICINE | Facility: HOSPITAL | Age: 44
Discharge: HOME | End: 2024-07-30
Attending: FAMILY MEDICINE
Payer: COMMERCIAL

## 2024-07-30 DIAGNOSIS — G47.33 OSA (OBSTRUCTIVE SLEEP APNEA): Primary | ICD-10-CM

## 2024-07-30 DIAGNOSIS — R06.83 SNORES: ICD-10-CM

## 2024-07-30 PROCEDURE — 95806 SLEEP STUDY UNATT&RESP EFFT: CPT | Performed by: INTERNAL MEDICINE

## 2024-07-30 PROCEDURE — 95800 SLP STDY UNATTENDED: CPT | Mod: 26,,, | Performed by: INTERNAL MEDICINE

## 2024-07-30 NOTE — PROCEDURES
PHYSICIAN INTERPRETATION AND COMMENTS: Findings are consistent with moderate obstructive sleep apnea (STACI).  Please refer to sleep disorders clinic  CLINICAL HISTORY: 44 year old female presented with: 18 inch neck, BMI of 42.9, an [place] sleepiness score of 5, history  of diabetes, depression and symptoms of nocturnal snoring. Based on the clinical history, the patient has a high pre-test  probability of having Moderate STACI.  SLEEP STUDY FINDINGS: Patient underwent a 1 night Home Sleep Test and by behavioral criteria, slept for approximately  6.01 hours, with a sleep latency of 30 minutes and a sleep efficiency of 88%. Mild sleep disordered breathing (AHI=10) is  noted based on a 4% hypopnea desaturation criteria. When considering more subtle measures of sleep disordered  breathing, the overall respiratory disturbance index is moderate(RDI=25) based on a 1% hypopnea desaturation criteria  with confirmation by surrogate arousal indicators. The apneas/hypopneas are accompanied by minimal oxygen  desaturation (percent time below 90% SpO2: 3%, Min SpO2: 73.4%). The average desaturation across all sleep disordered  breathing events is 3.4%. The mean pulse rate is 62.4 BPM, with very frequent pulse rate variability (71 events with >= 6 BPM  increase/decrease per hour).  TREATMENT CONSIDERATIONS: Consider nasal continuous positive airway pressure (CPAP/AutoPAP) as the initial  treatment choice based on the RDI severity and co-morbidities. A mandibular advancement splint (MAS) or referral to an  ENT surgeon for modification to the airway should be considered to reduce the potential contribution of STACI on existing  diseases if the patient prefers an alternative therapy or the CPAP trial is unsuccessful.  DISEASE MANAGEMENT CONSIDERATIONS: Insomnia is a symptom that can be associated with untreated STACI. Sleeping  pills may increase the severity of untreated STACI and their use should be reevaluated once the STACI is  "treated    Dear Yoan Henry MD  [id number] [place] [address]/Yoan Henry MD         The sleep study that you ordered is complete.  You have ordered sleep LAB services to perform the sleep study for Brook Burdick .      Please find Sleep Study result in  the "Media tab" of Chart Review menu.        You can look  for the report in the  Media by the document type "Sleep Study Documents". Alphabetizing  "Document type" column helps to find the SLEEP STUDY report  Faster.       As the ordering provider, you are responsible for reviewing the results and implementing a treatment plan with your patient.    If you need a Sleep Medicine provider to explain the sleep study findings and arrange treatment for the patient, please refer patient for consultation to our Sleep Clinic via [facility] with Ambulatory Consult Sleep.     To do that please place an order for an  "Ambulatory Consult Sleep" -  order , it will go to our clinic work queue for our staff  to contact the patient for an appointment.      For any questions, please contact our sleep lab  staff at [phone] to talk to clinical staff          Rickey Rogers MD    "

## 2024-08-05 ENCOUNTER — PATIENT MESSAGE (OUTPATIENT)
Dept: PULMONOLOGY | Facility: CLINIC | Age: 44
End: 2024-08-05
Payer: COMMERCIAL

## 2024-08-07 ENCOUNTER — PATIENT MESSAGE (OUTPATIENT)
Dept: OPHTHALMOLOGY | Facility: CLINIC | Age: 44
End: 2024-08-07
Payer: COMMERCIAL

## 2024-08-07 DIAGNOSIS — H00.12 CHALAZION RIGHT LOWER EYELID: ICD-10-CM

## 2024-08-07 RX ORDER — ERYTHROMYCIN 5 MG/G
OINTMENT OPHTHALMIC 3 TIMES DAILY PRN
Qty: 1 G | Refills: 3 | Status: SHIPPED | OUTPATIENT
Start: 2024-08-07 | End: 2024-08-21

## 2024-08-08 DIAGNOSIS — H00.15 CHALAZION LEFT LOWER EYELID: Primary | ICD-10-CM

## 2024-08-08 RX ORDER — DOXYCYCLINE HYCLATE 100 MG
100 TABLET ORAL EVERY 12 HOURS
Qty: 20 TABLET | Refills: 0 | Status: SHIPPED | OUTPATIENT
Start: 2024-08-08 | End: 2024-08-18

## 2024-11-22 NOTE — TELEPHONE ENCOUNTER
Attempted to contacted patient as a courtsey call from her recent Urgent Care visit on 03.14.2022, patient did not answer, left voicemail. /[email or web address]/  
No

## 2024-12-03 ENCOUNTER — PATIENT OUTREACH (OUTPATIENT)
Dept: ADMINISTRATIVE | Facility: HOSPITAL | Age: 44
End: 2024-12-03
Payer: COMMERCIAL

## 2024-12-03 ENCOUNTER — OFFICE VISIT (OUTPATIENT)
Dept: PAIN MEDICINE | Facility: CLINIC | Age: 44
End: 2024-12-03
Payer: COMMERCIAL

## 2024-12-03 DIAGNOSIS — M47.816 LUMBAR SPONDYLOSIS: ICD-10-CM

## 2024-12-03 DIAGNOSIS — M25.561 CHRONIC PAIN OF BOTH KNEES: ICD-10-CM

## 2024-12-03 DIAGNOSIS — G89.29 CHRONIC PAIN OF BOTH KNEES: ICD-10-CM

## 2024-12-03 DIAGNOSIS — M25.562 CHRONIC PAIN OF BOTH KNEES: ICD-10-CM

## 2024-12-03 DIAGNOSIS — M79.18 CHRONIC MYOFASCIAL PAIN: Primary | ICD-10-CM

## 2024-12-03 DIAGNOSIS — M79.12 MYALGIA OF AUXILIARY MUSCLES, HEAD AND NECK: ICD-10-CM

## 2024-12-03 DIAGNOSIS — G89.29 CHRONIC MYOFASCIAL PAIN: Primary | ICD-10-CM

## 2024-12-03 PROCEDURE — 99441 PR PHYSICIAN TELEPHONE EVALUATION 5-10 MIN: CPT | Mod: 95,,, | Performed by: PHYSICIAN ASSISTANT

## 2024-12-03 PROCEDURE — 3044F HG A1C LEVEL LT 7.0%: CPT | Mod: CPTII,95,, | Performed by: PHYSICIAN ASSISTANT

## 2024-12-03 RX ORDER — TIZANIDINE 4 MG/1
TABLET ORAL
Qty: 90 TABLET | Refills: 3 | Status: SHIPPED | OUTPATIENT
Start: 2024-12-03

## 2024-12-03 RX ORDER — PREGABALIN 150 MG/1
150 CAPSULE ORAL NIGHTLY
Qty: 90 CAPSULE | Refills: 3 | Status: SHIPPED | OUTPATIENT
Start: 2024-12-03 | End: 2025-11-28

## 2024-12-03 NOTE — PROGRESS NOTES
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 Refill: 3    Chronic pain of both knees  -     pregabalin (LYRICA) 150 MG capsule; Take 1 capsule (150 mg total) by mouth every evening.  Dispense: 90 capsule; Refill: 3  -     tiZANidine (ZANAFLEX) 4 MG tablet; TAKE 1/2 TO 1 TABLET BY MOUTH NIGHTLY AS NEEDED FOR MUSCLE SPASMS. MAY CAUSE DROWSINESS  Dispense: 90 tablet; Refill: 3    Lumbar spondylosis  -     pregabalin (LYRICA) 150 MG capsule; Take 1 capsule (150 mg total) by mouth every evening.  Dispense: 90 capsule; Refill: 3  -     tiZANidine (ZANAFLEX) 4 MG tablet; TAKE 1/2 TO 1 TABLET BY MOUTH NIGHTLY AS NEEDED FOR MUSCLE SPASMS. MAY CAUSE DROWSINESS  Dispense: 90 tablet; Refill: 3            PLAN:   - Interventional:  None at this time.         - Pharmacologic:   -Refill Lyrica 150 mg nightly  -Refill tizanidine 2-4 mg nightly as needed    -  Previously discontinued zonisamide.  -  No longer utilizing Voltaren gel.    - Anticoagulation use: None.         report:  Reviewed and consistent with medication use as prescribed.    - Rehabilitative:  Continue exercises and activities as tolerated.       - Diagnostic:  Reviewed     - Follow up:   6 months or as needed     - This condition does not require this patient to take time off of work, and the  primary goal of our Pain Management services is to improve the patient's functional capacity.      - I discussed the risks, benefits, and alternatives to potential treatment options. All questions and concerns were fully addressed today in clinic.     The above plan and management options were discussed at length with patient. Patient is in agreement with the above and verbalized understanding.      Freya Hoffman PA-C  Interventional Pain Management  [facility]

## 2024-12-03 NOTE — PROGRESS NOTES
VBC OUTREACH: per chart review pt is OVERDUE HA1C, already linked to lab appt 12.4.24, MICROALBUMIN DUE, I linked to lab appt 12.4.24.

## 2024-12-04 ENCOUNTER — TELEPHONE (OUTPATIENT)
Dept: PAIN MEDICINE | Facility: CLINIC | Age: 44
End: 2024-12-04
Payer: COMMERCIAL

## 2024-12-04 NOTE — TELEPHONE ENCOUNTER
Called patient to scheduled her 6 mos f/u but patient didn't answer the phone LVM to call back at earliest convenience.    David ADAMES

## 2024-12-05 ENCOUNTER — LAB VISIT (OUTPATIENT)
Dept: LAB | Facility: HOSPITAL | Age: 44
End: 2024-12-05
Attending: FAMILY MEDICINE
Payer: COMMERCIAL

## 2024-12-05 DIAGNOSIS — E11.9 TYPE 2 DIABETES MELLITUS WITHOUT COMPLICATION: ICD-10-CM

## 2024-12-05 DIAGNOSIS — Z79.899 ENCOUNTER FOR LONG-TERM (CURRENT) USE OF MEDICATIONS: ICD-10-CM

## 2024-12-05 LAB
ALBUMIN SERPL BCP-MCNC: 3.4 G/DL (ref 3.5–5.2)
ALBUMIN/CREAT UR: 4.3 UG/MG (ref 0–30)
ALP SERPL-CCNC: 69 U/L (ref 40–150)
ALT SERPL W/O P-5'-P-CCNC: 27 U/L (ref 10–44)
ANION GAP SERPL CALC-SCNC: 8 MMOL/L (ref 8–16)
AST SERPL-CCNC: 25 U/L (ref 10–40)
BASOPHILS # BLD AUTO: 0.07 K/UL (ref 0–0.2)
BASOPHILS NFR BLD: 1.3 % (ref 0–1.9)
BILIRUB SERPL-MCNC: 0.6 MG/DL (ref 0.1–1)
BUN SERPL-MCNC: 11 MG/DL (ref 6–20)
CALCIUM SERPL-MCNC: 8.8 MG/DL (ref 8.7–10.5)
CHLORIDE SERPL-SCNC: 110 MMOL/L (ref 95–110)
CHOLEST SERPL-MCNC: 184 MG/DL (ref 120–199)
CHOLEST/HDLC SERPL: 5.6 {RATIO} (ref 2–5)
CO2 SERPL-SCNC: 22 MMOL/L (ref 23–29)
CREAT SERPL-MCNC: 0.8 MG/DL (ref 0.5–1.4)
CREAT UR-MCNC: 184 MG/DL (ref 15–325)
DIFFERENTIAL METHOD BLD: NORMAL
EOSINOPHIL # BLD AUTO: 0.2 K/UL (ref 0–0.5)
EOSINOPHIL NFR BLD: 3.1 % (ref 0–8)
ERYTHROCYTE [DISTWIDTH] IN BLOOD BY AUTOMATED COUNT: 12.4 % (ref 11.5–14.5)
EST. GFR  (NO RACE VARIABLE): >60 ML/MIN/1.73 M^2
ESTIMATED AVG GLUCOSE: 100 MG/DL (ref 68–131)
GLUCOSE SERPL-MCNC: 86 MG/DL (ref 70–110)
HBA1C MFR BLD: 5.1 % (ref 4–5.6)
HCT VFR BLD AUTO: 37.6 % (ref 37–48.5)
HDLC SERPL-MCNC: 33 MG/DL (ref 40–75)
HDLC SERPL: 17.9 % (ref 20–50)
HGB BLD-MCNC: 12.3 G/DL (ref 12–16)
IMM GRANULOCYTES # BLD AUTO: 0.01 K/UL (ref 0–0.04)
IMM GRANULOCYTES NFR BLD AUTO: 0.2 % (ref 0–0.5)
LDLC SERPL CALC-MCNC: 131.6 MG/DL (ref 63–159)
LYMPHOCYTES # BLD AUTO: 1.9 K/UL (ref 1–4.8)
LYMPHOCYTES NFR BLD: 34.9 % (ref 18–48)
MCH RBC QN AUTO: 30 PG (ref 27–31)
MCHC RBC AUTO-ENTMCNC: 32.7 G/DL (ref 32–36)
MCV RBC AUTO: 92 FL (ref 82–98)
MICROALBUMIN UR DL<=1MG/L-MCNC: 8 UG/ML
MONOCYTES # BLD AUTO: 0.5 K/UL (ref 0.3–1)
MONOCYTES NFR BLD: 8.4 % (ref 4–15)
NEUTROPHILS # BLD AUTO: 2.9 K/UL (ref 1.8–7.7)
NEUTROPHILS NFR BLD: 52.1 % (ref 38–73)
NONHDLC SERPL-MCNC: 151 MG/DL
NRBC BLD-RTO: 0 /100 WBC
PLATELET # BLD AUTO: 349 K/UL (ref 150–450)
PMV BLD AUTO: 10.7 FL (ref 9.2–12.9)
POTASSIUM SERPL-SCNC: 4.2 MMOL/L (ref 3.5–5.1)
PROT SERPL-MCNC: 6.9 G/DL (ref 6–8.4)
RBC # BLD AUTO: 4.1 M/UL (ref 4–5.4)
SODIUM SERPL-SCNC: 140 MMOL/L (ref 136–145)
TRIGL SERPL-MCNC: 97 MG/DL (ref 30–150)
TSH SERPL DL<=0.005 MIU/L-ACNC: 2.06 UIU/ML (ref 0.4–4)
WBC # BLD AUTO: 5.47 K/UL (ref 3.9–12.7)

## 2024-12-05 PROCEDURE — 83036 HEMOGLOBIN GLYCOSYLATED A1C: CPT | Performed by: FAMILY MEDICINE

## 2024-12-05 PROCEDURE — 85025 COMPLETE CBC W/AUTO DIFF WBC: CPT | Performed by: FAMILY MEDICINE

## 2024-12-05 PROCEDURE — 80061 LIPID PANEL: CPT | Performed by: FAMILY MEDICINE

## 2024-12-05 PROCEDURE — 36415 COLL VENOUS BLD VENIPUNCTURE: CPT | Performed by: FAMILY MEDICINE

## 2024-12-05 PROCEDURE — 82043 UR ALBUMIN QUANTITATIVE: CPT | Performed by: FAMILY MEDICINE

## 2024-12-05 PROCEDURE — 84443 ASSAY THYROID STIM HORMONE: CPT | Performed by: FAMILY MEDICINE

## 2024-12-05 PROCEDURE — 80053 COMPREHEN METABOLIC PANEL: CPT | Performed by: FAMILY MEDICINE

## 2024-12-11 ENCOUNTER — OFFICE VISIT (OUTPATIENT)
Dept: INTERNAL MEDICINE | Facility: CLINIC | Age: 44
End: 2024-12-11
Payer: COMMERCIAL

## 2024-12-11 VITALS
WEIGHT: 293 LBS | HEART RATE: 84 BPM | TEMPERATURE: 97 F | OXYGEN SATURATION: 98 % | DIASTOLIC BLOOD PRESSURE: 78 MMHG | SYSTOLIC BLOOD PRESSURE: 104 MMHG | BODY MASS INDEX: 40.96 KG/M2 | RESPIRATION RATE: 18 BRPM

## 2024-12-11 DIAGNOSIS — F32.A ANXIETY AND DEPRESSION: ICD-10-CM

## 2024-12-11 DIAGNOSIS — E66.01 SEVERE OBESITY (BMI >= 40): ICD-10-CM

## 2024-12-11 DIAGNOSIS — F33.1 DEPRESSION, MAJOR, RECURRENT, MODERATE: ICD-10-CM

## 2024-12-11 DIAGNOSIS — F41.9 ANXIETY AND DEPRESSION: ICD-10-CM

## 2024-12-11 DIAGNOSIS — J06.9 URTI (ACUTE UPPER RESPIRATORY INFECTION): ICD-10-CM

## 2024-12-11 DIAGNOSIS — E11.8 CONTROLLED TYPE 2 DIABETES MELLITUS WITH COMPLICATION, WITHOUT LONG-TERM CURRENT USE OF INSULIN: Primary | ICD-10-CM

## 2024-12-11 DIAGNOSIS — K21.9 GASTROESOPHAGEAL REFLUX DISEASE WITHOUT ESOPHAGITIS: ICD-10-CM

## 2024-12-11 DIAGNOSIS — E78.5 HYPERLIPIDEMIA, UNSPECIFIED HYPERLIPIDEMIA TYPE: ICD-10-CM

## 2024-12-11 DIAGNOSIS — E66.01 MORBID OBESITY WITH BMI OF 40.0-44.9, ADULT: ICD-10-CM

## 2024-12-11 DIAGNOSIS — R06.83 SNORES: ICD-10-CM

## 2024-12-11 PROCEDURE — 99999 PR PBB SHADOW E&M-EST. PATIENT-LVL III: CPT | Mod: PBBFAC,,, | Performed by: FAMILY MEDICINE

## 2024-12-11 PROCEDURE — G2211 COMPLEX E/M VISIT ADD ON: HCPCS | Mod: S$GLB,,, | Performed by: FAMILY MEDICINE

## 2024-12-11 PROCEDURE — 3078F DIAST BP <80 MM HG: CPT | Mod: CPTII,S$GLB,, | Performed by: FAMILY MEDICINE

## 2024-12-11 PROCEDURE — 3008F BODY MASS INDEX DOCD: CPT | Mod: CPTII,S$GLB,, | Performed by: FAMILY MEDICINE

## 2024-12-11 PROCEDURE — 3074F SYST BP LT 130 MM HG: CPT | Mod: CPTII,S$GLB,, | Performed by: FAMILY MEDICINE

## 2024-12-11 PROCEDURE — 3044F HG A1C LEVEL LT 7.0%: CPT | Mod: CPTII,S$GLB,, | Performed by: FAMILY MEDICINE

## 2024-12-11 PROCEDURE — 3066F NEPHROPATHY DOC TX: CPT | Mod: CPTII,S$GLB,, | Performed by: FAMILY MEDICINE

## 2024-12-11 PROCEDURE — 1159F MED LIST DOCD IN RCRD: CPT | Mod: CPTII,S$GLB,, | Performed by: FAMILY MEDICINE

## 2024-12-11 PROCEDURE — 3061F NEG MICROALBUMINURIA REV: CPT | Mod: CPTII,S$GLB,, | Performed by: FAMILY MEDICINE

## 2024-12-11 PROCEDURE — 99214 OFFICE O/P EST MOD 30 MIN: CPT | Mod: S$GLB,,, | Performed by: FAMILY MEDICINE

## 2024-12-11 RX ORDER — TIRZEPATIDE 15 MG/.5ML
15 INJECTION, SOLUTION SUBCUTANEOUS
Qty: 4 PEN | Refills: 6 | Status: SHIPPED | OUTPATIENT
Start: 2024-12-11

## 2024-12-11 RX ORDER — PANTOPRAZOLE SODIUM 20 MG/1
20 TABLET, DELAYED RELEASE ORAL DAILY
Qty: 90 TABLET | Refills: 1 | Status: SHIPPED | OUTPATIENT
Start: 2024-12-11 | End: 2025-12-11

## 2024-12-11 RX ORDER — PRAVASTATIN SODIUM 20 MG/1
20 TABLET ORAL DAILY
Qty: 90 TABLET | Refills: 1 | Status: SHIPPED | OUTPATIENT
Start: 2024-12-11 | End: 2025-12-11

## 2024-12-11 RX ORDER — FLUTICASONE PROPIONATE 50 MCG
1 SPRAY, SUSPENSION (ML) NASAL DAILY
Qty: 16 G | Refills: 0 | Status: SHIPPED | OUTPATIENT
Start: 2024-12-11

## 2024-12-11 NOTE — PROGRESS NOTES
Subjective:       Patient ID: Brook Burdick is a 44 y.o. female.    Chief Complaint:   HPI    Patient Active Problem List   Diagnosis    Chronic constipation    Family history of colonic polyps    Insulin resistance    Gastroesophageal reflux disease without esophagitis    Severe obesity (BMI >= 40)    Chronic headaches    Cough    Arthritis of knee    Neck pain    Right arm pain    Decreased ROM of intervertebral discs of cervical spine    Right arm weakness    Anxiety and depression    Bilateral carpal tunnel syndrome    Newly diagnosed diabetes    Depression, major, recurrent, moderate    PCOS (polycystic ovarian syndrome)    Snores       Past Medical History:   Diagnosis Date    Arthritis of knee 9/30/2020    Encounter for blood transfusion     IBS (irritable bowel syndrome)     Liver disease     NALFD    Newly diagnosed diabetes 1/29/2024    Obesity     Pancreatitis        Past Surgical History:   Procedure Laterality Date    APPENDECTOMY      BILE DUCT EXPLORATION      CHOLECYSTECTOMY      INJECTION OF JOINT Bilateral 09/30/2020    Procedure: Bilateral intraarticular knee injection with local;  Surgeon: Ozzy Sofia MD;  Location: [facility];  Service: Pain Management;  Laterality: Bilateral;       Family History   Problem Relation Name Age of Onset    Cataracts Mother Karyn Burdick     Atrial fibrillation Mother Karyn Burdick     Arthritis Mother Karyn Burdick     Miscarriages / Stillbirths Mother Karyn Burdick     Diabetes Father Mau Burdick     Hypertension Father Rahulnona Gennaro     Stroke Father Mau Burdick     Diabetes Sister      Hypertension Sister      Diabetes Sister Nytoya Martinezman     Miscarriages / Stillbirths Sister Diane Alonzo     Hypertension Brother      Diabetes Brother      COPD Brother Mau Burdick Jr     Diabetes Brother Mau Burdick Jr     Hypertension Brother Mau Burdick Jr     Cancer Maternal Grandfather Irvinmatty Tomas     Hypertension Sister Diane Luo      Miscarriages / Stillbirths Sister Diane [facility]        Social History     Tobacco Use   Smoking Status Former    Types: Vaping w/o nicotine    Start date:     Quit date:     Years since quittin.9    Passive exposure: Never   Smokeless Tobacco Never       Wt Readings from Last 5 Encounters:   24 (!) 137 kg (302 lb)   24 (!) 144.7 kg (319 lb)   24 (!) 151.2 kg (333 lb 5.4 oz)   24 (!) 154.9 kg (341 lb 9.6 oz)   23 (!) 159.5 kg (351 lb 10.1 oz)     History of Present Illness    CHIEF COMPLAINT:  Patient presents today for chronic condition follow-up and an acute issue.    ACUTE RESPIRATORY SYMPTOMS:  She reports experiencing congestion for 5 days with associated chills no fever. She denies shortness of breath or fever but mentions substantial nasal congestion    TYPE 2 DIABETES AND WEIGHT MANAGEMENT:  She reports taking Mounjaro 12.5 mg for type 2 diabetes and weight management. She is exercising regularly, tolerating the medication well, and has experienced steady weight loss since starting.A1c controlled    MENTAL HEALTH:  She continues to work with a psychiatrist for management of her mental health conditions. She reports taking Lexapro and Vyvanse as prescribed and states she is doing well with her current medication regimen.    CHRONIC PAIN:  She saw a pain management physician in early December for chronic myofascial pain, myalgia of axillary muscles of head and neck, chronic pain of both knees, and lumbar spondylosis. Prescriptions for Lyrica and Tizanidine were refilled. She reports doing well with current pain management regimen.    GASTROESOPHAGEAL REFLUX DISEASE (GERD):  She reports taking protonics for GERD and states she is doing well with the medication.    OBSTRUCTIVE SLEEP APNEA:  She was diagnosed with obstructive sleep apnea but is not currently using a CPAP machine for treatment. Has not yet seen pulm but is willing to see  them    HYPERLIPIDEMIA:  She reports stopping Lipitor approximately 2-3 weeks after starting due to experiencing headaches.    MENSTRUAL CYCLE:  She reports the return of her menstrual cycle since August, after years of amenorrhea. She expresses uncertainty about the cause of this change and has not yet informed her gynecologist.   Declines vaccines, pap or mammo        Objective:      Vitals:    12/11/24 1102   BP: 104/78   Pulse: 84   Resp: 18   Temp: 97.2 °F (36.2 °C)       Physical Exam  Constitutional:       General: She is not in acute distress.     Appearance: She is not ill-appearing.   Pulmonary:      Effort: Pulmonary effort is normal. No respiratory distress.   Neurological:      General: No focal deficit present.      Mental Status: She is alert.   Psychiatric:         Mood and Affect: Mood normal.         Behavior: Behavior normal.         Assessment:       1. Controlled type 2 diabetes mellitus with complication, without long-term current use of insulin    2. Snores    3. Severe obesity (BMI >= 40)    4. Anxiety and depression    5. Gastroesophageal reflux disease without esophagitis    6. URTI (acute upper respiratory infection)    7. Depression, major, recurrent, moderate    8. Hyperlipidemia, unspecified hyperlipidemia type    9. Morbid obesity with BMI of 40.0-44.9, adult        Plan:       Assessment & Plan    PLAN SUMMARY:  Increased Mounjaro to 15 mg with multiple refills  Discontinued Lipitor due to side effects - HAs  Started Pravastatin as alternative cholesterol-lowering medication  Continued Protonix with refill  Started Flonase nasal spray for congestion/ URTI  Recommend Afrin nasal spray for short-term use (2-3 days maximum)  Recommend OTC medications for URI symptoms: Tylenol, Ibuprofen, Zyrtec/Allegra, DayQuil/NyQuil  Continued Lexapro (prescribed by psychiatrist)  Continued Lyrica and Tizanidine (prescribed by pain management physician)  Continued Vyvanse (prescribed by  "psychiatrist)  Recommend limiting red meat consumption, focus on chicken or fish  Follow up in 6 months    TYPE 2 DIABETES MELLITUS:  Assessed chronic condition: type 2 diabetes.  Reviewed recent lab results: A1C, urine microalbumin.  Evaluated weight loss progress on Mounjaro, decided to increase to maximum dose.  Increased Mounjaro to 15 mg (highest dose) with multiple refills.    OBSTRUCTIVE SLEEP APNEA:  Assessed chronic condition: sleep apnea.  Explained importance of treating sleep apnea for heart and lung protection. Encourage work with sleep disorder clinic    HYPERLIPIDEMIA:  Assessed chronic condition: hyperlipidemia.  Reviewed recent lab results: cholesterol.  Considered alternative statin (Pravastatin) due to patient's intolerance of Lipitor.  Uncertain of her inquiry in regards to usage of "cholesterol vitamins" and thus advised against their use.  Discontinued Lipitor due to patient-reported headaches.  Started Pravastatin as alternative cholesterol-lowering medication.    GASTROESOPHAGEAL REFLUX DISEASE (GERD):  Assessed chronic condition: GERD.  Continued Protonix with refill.    ANXIETY AND DEPRESSION:  Assessed chronic conditions: anxiety, depression.  Continued Lexapro (prescribed by psychiatrist).    ATTENTION DEFICIT HYPERACTIVITY DISORDER (ADHD):  Assessed chronic condition: ADHD.  Continued Vyvanse (prescribed by psychiatrist).    CHRONIC PAIN:  Assessed chronic condition: chronic pain.  Continued Lyrica and Tizanidine (prescribed by pain management physician).    MORBID OBESITY:  Assessed chronic condition: morbid obesity.  Patient to continue weight loss efforts and exercise to maintain muscle mass.    ACUTE UPPER RESPIRATORY INFECTION:  Diagnosed viral upper respiratory tract infection based on 5-day duration of symptoms without fever or shortness of breath.  Declined to prescribe steroids or antibiotics for URI, recommended conservative management.  Educated on the nature of viral upper " respiratory infections and typical course.  Informed about proper use of Afrin nasal spray (limited to 2-3 days) and Flonase.  Patient to implement conservative measures for URI management: hydration, rest, hand hygiene.  Recommend OTC medications for URI symptoms: Tylenol, Ibuprofen, Zyrtec or Allegra, DayQuil, NyQuil.  Started Flonase nasal spray for congestion.  Recommend Afrin nasal spray for short-term use (2-3 days maximum).    DIETARY COUNSELING:  Recommend increasing protein intake in diet t  o address mildly low albumin.  Recommend limiting red meat consumption, focus on chicken or fish for better vascular health.    FOLLOW-UP:  Follow up in 6 months.

## 2024-12-12 ENCOUNTER — TELEPHONE (OUTPATIENT)
Dept: INTERNAL MEDICINE | Facility: CLINIC | Age: 44
End: 2024-12-12
Payer: COMMERCIAL

## 2024-12-12 NOTE — TELEPHONE ENCOUNTER
----- Message from Paul sent at 12/12/2024  8:48 AM CST -----  Contact: [facility]  Honey is requesting a call back in regards to getting a PA on the   tirzepatide (MOUNJARO) 15 mg/0.5 mL PnIj   Please give her a call back at   Fax [phone]

## 2024-12-12 NOTE — TELEPHONE ENCOUNTER
----- Message from Paul sent at 12/12/2024  8:51 AM CST -----  Contact: [facility]  Honey is requesting a call back in regards to getting a PA on the   tirzepatide (MOUNJARO) 15 mg/0.5 mL PnIj   Please give her a call back at   Fax [phone]    Inactive act for allegra, call  for new coverage

## 2025-01-02 ENCOUNTER — PATIENT MESSAGE (OUTPATIENT)
Dept: INTERNAL MEDICINE | Facility: CLINIC | Age: 45
End: 2025-01-02
Payer: COMMERCIAL

## 2025-01-02 DIAGNOSIS — F90.2 ATTENTION DEFICIT HYPERACTIVITY DISORDER (ADHD), COMBINED TYPE: ICD-10-CM

## 2025-01-02 RX ORDER — AZITHROMYCIN 250 MG/1
TABLET, FILM COATED ORAL
Qty: 6 TABLET | Refills: 0 | Status: SHIPPED | OUTPATIENT
Start: 2025-01-02 | End: 2025-01-07

## 2025-01-03 DIAGNOSIS — M79.12 MYALGIA OF AUXILIARY MUSCLES, HEAD AND NECK: ICD-10-CM

## 2025-01-03 DIAGNOSIS — M25.562 CHRONIC PAIN OF BOTH KNEES: ICD-10-CM

## 2025-01-03 DIAGNOSIS — G89.29 CHRONIC PAIN OF BOTH KNEES: ICD-10-CM

## 2025-01-03 DIAGNOSIS — M47.816 LUMBAR SPONDYLOSIS: ICD-10-CM

## 2025-01-03 DIAGNOSIS — M79.18 CHRONIC MYOFASCIAL PAIN: ICD-10-CM

## 2025-01-03 DIAGNOSIS — M25.561 CHRONIC PAIN OF BOTH KNEES: ICD-10-CM

## 2025-01-03 DIAGNOSIS — G89.29 CHRONIC MYOFASCIAL PAIN: ICD-10-CM

## 2025-01-03 RX ORDER — LISDEXAMFETAMINE DIMESYLATE 50 MG/1
50 CAPSULE ORAL EVERY MORNING
Qty: 30 CAPSULE | Refills: 0 | Status: SHIPPED | OUTPATIENT
Start: 2025-01-03 | End: 2025-02-02

## 2025-01-03 RX ORDER — PREGABALIN 150 MG/1
150 CAPSULE ORAL NIGHTLY
Qty: 90 CAPSULE | Refills: 1 | Status: SHIPPED | OUTPATIENT
Start: 2025-01-03 | End: 2025-07-02

## 2025-01-21 ENCOUNTER — OFFICE VISIT (OUTPATIENT)
Dept: PSYCHIATRY | Facility: CLINIC | Age: 45
End: 2025-01-21
Payer: COMMERCIAL

## 2025-01-21 DIAGNOSIS — F41.1 GENERALIZED ANXIETY DISORDER: ICD-10-CM

## 2025-01-21 DIAGNOSIS — F33.42 RECURRENT MAJOR DEPRESSIVE DISORDER, IN FULL REMISSION: ICD-10-CM

## 2025-01-21 DIAGNOSIS — F90.2 ATTENTION DEFICIT HYPERACTIVITY DISORDER (ADHD), COMBINED TYPE: Primary | ICD-10-CM

## 2025-01-21 PROCEDURE — 90832 PSYTX W PT 30 MINUTES: CPT | Mod: 95,,, | Performed by: PSYCHOLOGIST

## 2025-01-21 PROCEDURE — 90863 PHARMACOLOGIC MGMT W/PSYTX: CPT | Mod: 95,,, | Performed by: PSYCHOLOGIST

## 2025-01-21 PROCEDURE — 1159F MED LIST DOCD IN RCRD: CPT | Mod: CPTII,95,, | Performed by: PSYCHOLOGIST

## 2025-01-21 PROCEDURE — 3072F LOW RISK FOR RETINOPATHY: CPT | Mod: CPTII,95,, | Performed by: PSYCHOLOGIST

## 2025-01-21 RX ORDER — LISDEXAMFETAMINE DIMESYLATE 50 MG/1
50 CAPSULE ORAL EVERY MORNING
Qty: 30 CAPSULE | Refills: 0 | Status: SHIPPED | OUTPATIENT
Start: 2025-03-22 | End: 2025-04-21

## 2025-01-21 RX ORDER — ALPRAZOLAM 0.25 MG/1
.25-.5 TABLET ORAL
Qty: 10 TABLET | Refills: 0 | Status: SHIPPED | OUTPATIENT
Start: 2025-01-21 | End: 2025-01-26

## 2025-01-21 RX ORDER — LISDEXAMFETAMINE DIMESYLATE 50 MG/1
50 CAPSULE ORAL EVERY MORNING
Qty: 30 CAPSULE | Refills: 0 | Status: SHIPPED | OUTPATIENT
Start: 2025-02-20 | End: 2025-03-22

## 2025-01-21 NOTE — PROGRESS NOTES
"Outpatient Psychiatry Follow-Up Visit    1/21/2025    Virtual Visit    The patient location is: Patient's home/ Patient reported that his/her location at the time of this visit was in the [facility]     Visit type: Virtual visit with synchronous audio and video     Each patient to whom he or she provides medical services by telehealth is: (1) informed of the relationship between the medical psychologist and patient and the respective role of any other health care provider with respect to management of the patient; and (2) notified that he or she may decline to receive medical services by telehealth and may withdraw from such care at any time.    I also informed patient of the following:   Chasidy Anthony, PhD, MPAP:  [place] medical license number: [id number]    My contact info:  [facility] 2nd Floor  [address]  [place] [address]   Ph: [phone]    If technology issues, call office phone: Ph: [phone] or [phone]  If crisis: Dial 911 or go to nearest Emergency Room (ER)  If questions related to privacy practices: contact [facility] Information Department: [phone]    Preferred Name: Allegra  Gender Identity: cis female  Preferred Pronouns: she/her    LAST VISIT: Allegra attended her visit. She was last seen by me in May of 2023. She finished her masters--she took the LPC exam but did not study and only missed it by a couple of points. She plans to take it again in June. She would like to get back on her stimulant. She has been taking Lexapro--finds it helpful. She lives with her sister and mom--is thinking of going back to school for her doctorate and write a dissertation on "raising your parents." She travels a lot with her older sister; she can work from anywhere. She goes places with her mom and sister, with family. She is not dating--talks to some people but nothing serious.     She has not been needing doxepin for sleep. We agreed to " "restart her Vyvanse at a lower dose. She still has Xanax (for tests) and doxepin if needed for sleep.     She is taking mounjaro--feels better overall and has lost about 40 pounds since November. She loves gardening; does a lot of volunteering with her sister (sister is  of the [employer]). 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     Plan--continue Lexapro 20 mg; restart Vyvanse 50 mg (sent 3 scripts)      History of Present Illness    CHIEF COMPLAINT:  Allegra presents for a follow-up visit to discuss her ongoing mental health management, including medication refills, last seen in March of the previous year.    HPI:  Allegra reports that her depression is well-controlled and "not bad at all." She has been taking her medication consistently and is "doing much better with that." She experiences occasional anxiety, particularly when feeling overwhelmed by multiple tasks, describing it as an "overwhelming feeling here and there" when she takes on too many responsibilities.    Allegra uses Vyvanse as needed for focus, particularly for work projects, studying, or when unable to concentrate. 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 She recently took the NCE (National Counselor Examination) and missed passing by only two points, despite minimal preparation. She plans to retake the exam in March or April.    Allegra continues to work for [employer] from home, which she enjoys. 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    Allegra requests a refill for Alprazolam, which she uses sparingly for overwhelming anxiety related to studying or before taking tests. She reports using less than one pill per month.    Allegra denies any worsening of depression symptoms or constant anxiety.    MEDICATIONS:  Allegra is on Vyvanse, taken as needed for focus, particularly during work projects or studying. She is also on Lyrica by another provider. She has a 10-day supply of Alprazolam (Xanax) for anxiety and overwhelming feelings, which was last filled in May and is used less than once a month.    FAMILY HISTORY:  Family history is significant for mother being a . Her sisters are noted to enjoy reading.    TESTS:  Allegra took the National Counselor Examination (NCE) last year and missed passing by 2 points.    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 She reports having reading comprehension difficulties and feeling overwhelmed by large amounts of text, noting that she did not have to study much in college. Allegra works for [employer] from home, which she enjoys. She is considering doing counseling part-time in evenings/weekends after obtaining full licensure and may switch to part-time work to get the required hours for licensure. At the time of the conversation, she is visiting and staying at her sister's house for about three weeks. Allegra mentions not being an avid reader, having read very few books in her 44 years. She has a sister whom she is currently visiting, and notes that her mother was a  and her sisters love to read books.    ROS:  Psychiatric: -depression, +anxiety       PSYCHIATRIC: Pertinant items are noted in the narrative.    Past Medical, Family and Social History: The patient's past medical, family and social history have been reviewed and updated as  appropriate within the electronic medical record - see encounter notes.     since May 2024.              1/21/2025    12:01 PM 1/21/2025     8:35 AM 8/12/2024    11:04 AM   GAD7   1. Feeling nervous, anxious, or on edge? 1  1  1    2. Not being able to stop or control worrying? 0  0  0    3. Worrying too much about different things? 1  0  1    4. Trouble relaxing? 1  1  1    5. Being so restless that it is hard to sit still? 0  0  1    6. Becoming easily annoyed or irritable? 1  1  2    7. Feeling afraid as if something awful might happen? 0  0  0    8. If you checked off any problems, how difficult have these problems made it for you to do your work, take care of things at home, or get along with other people? 0  0  1    KATHLEEN-7 Score 4  3  6       Patient-reported      0-4 = Minimal anxiety  5-9 = Mild anxiety  10-14 = Moderate anxiety  15-21 = Severe anxiety         1/29/2024     1:18 PM   Depression Patient Health Questionnaire   Over the last two weeks how often have you been bothered by little interest or pleasure in doing things Not at all   Over the last two weeks how often have you been bothered by feeling down, depressed or hopeless Not at all   PHQ-2 Total Score 0     0-4 = No intervention  5 to 9 = Mild  10 to 14 = Moderate  15 to 19 = Moderately severe  =20 = Severe    Risk Parameters:  Patient reports no suicidal ideation  Patient reports no homicidal ideation  Patient reports no self-injurious behavior  Patient reports no violent behavior    Exam (detailed: at least 9 elements; comprehensive: all 15 elements)   Constitutional  Vitals:  Most recent vital signs were reviewed.   Last 3 sets of Vitals        5/8/2024     4:39 PM 6/11/2024    11:31 AM 12/11/2024    11:02 AM   Vitals - 1 value per visit   SYSTOLIC 138 128 104   DIASTOLIC 81 86 78   Pulse 69 75 84   Temp  98.5 °F (36.9 °C) 97.2 °F (36.2 °C)   Resp  18 18   SPO2  96 % 98 %   Weight (lb) 333.34 319 302   Weight (kg) 151.2 144.697 136.986    Pain Score  Zero Zero          General:  age appropriate, casually dressed, neatly groomed     Musculoskeletal  Muscle Strength/Tone:  no tremor, no tic   Gait & Station:  video visit     Psychiatric  Speech:  no latency; no press   Behavior: wnl   Mood & Affect:  anxious, sometimes  congruent and appropriate   Thought Process:  normal and logical   Associations:  intact   Thought Content:  normal, no suicidality, no homicidality, delusions, or paranoia   Insight:  has awareness of illness   Judgement: behavior is adequate to circumstances   Orientation:  grossly intact   Memory: intact for content of interview   Language: grossly intact   Attention Span & Concentration:  Grossly intact   Fund of Knowledge:  intact and appropriate to age and level of education     General Impression:     Encounter Diagnoses   Name Primary?    Attention deficit hyperactivity disorder (ADHD), combined type Yes    Generalized anxiety disorder     Recurrent major depressive disorder, in full remission        Assessment & Plan    GENERALIZED ANXIETY DISORDER:  - Discussed potential anxiolytic effects of Lyrica.  - Allegra to use Alprazolam (Xanax) cautiously and intermittently for anxiety, not daily.  - Refilled Alprazolam (Xanax) with 10 tablets; patient to use cautiously and intermittently for anxiety, not daily.    ATTENTION-DEFICIT HYPERACTIVITY DISORDER:  - Allegra to use Vyvanse as needed for focus during work projects or studying.  - Continued Vyvanse; patient to use as needed for focus during work projects or studying.    FOLLOW-UP:  - Follow up in late March for an in-person visit to comply with annual requirement.  - Contact the office if any issues arise before next scheduled appointment.         I spent an additional 10 minutes performing E/M services with >50% spent on counseling, guidance, coordinating care (not Psychotherapy related) in addition to the 16 minutes performing Psychotherapy.    I spent a total of 26 minutes  on the day of the visit. This includes face to face time and non-face to face time preparing to see the patient (eg, review of tests), obtaining and/or reviewing separately obtained history, documenting clinical information in the electronic or other health record, independently interpreting results and communicating results to the patient/family/caregiver, or care coordinator.        Chasidy Anthony, PhD, MP  Advanced Practice Medical Psychologist  [facility] [address]  [place] [address]  [phone]   [phone] fax    This note was generated with the assistance of ambient listening technology. Verbal consent was obtained by the patient and accompanying visitor(s) for the recording of patient appointment to facilitate this note. I attest to having reviewed and edited the generated note for accuracy, though some syntax or spelling errors may persist. Please contact the author of this note for any clarification.

## 2025-01-21 NOTE — PATIENT INSTRUCTIONS

## 2025-02-26 ENCOUNTER — PATIENT MESSAGE (OUTPATIENT)
Dept: PSYCHIATRY | Facility: CLINIC | Age: 45
End: 2025-02-26
Payer: COMMERCIAL

## 2025-04-04 ENCOUNTER — TELEPHONE (OUTPATIENT)
Dept: INTERNAL MEDICINE | Facility: CLINIC | Age: 45
End: 2025-04-04
Payer: COMMERCIAL

## 2025-04-04 NOTE — TELEPHONE ENCOUNTER
----- Message from Alice sent at 4/4/2025 11:11 AM CDT -----  Contact: Brigitte  TYPE: Patient Call BackWho called:Brigitte with  Rehab What is the request in detail:  Dr. Demond Cabrera called in to request for zoom meeting with Dr. Yoan Henry in regards to patient. Please give a call back. Thanks Can the clinic reply by [facility]?   Would the patient rather a call back or a response via [facility] call back number:[phone]- Ext [phone]

## 2025-04-04 NOTE — TELEPHONE ENCOUNTER
Called and gave nurse Dr. Henry cell number at his request. Dr. Cabrera will call later today to speak.

## 2025-05-06 ENCOUNTER — PATIENT MESSAGE (OUTPATIENT)
Dept: PSYCHIATRY | Facility: CLINIC | Age: 45
End: 2025-05-06
Payer: COMMERCIAL

## 2025-05-06 DIAGNOSIS — F41.1 GENERALIZED ANXIETY DISORDER: ICD-10-CM

## 2025-05-06 DIAGNOSIS — F33.42 RECURRENT MAJOR DEPRESSIVE DISORDER, IN FULL REMISSION: ICD-10-CM

## 2025-05-06 RX ORDER — ESCITALOPRAM OXALATE 20 MG/1
TABLET ORAL
Qty: 90 TABLET | Refills: 0 | Status: SHIPPED | OUTPATIENT
Start: 2025-05-06

## 2025-06-11 ENCOUNTER — LAB VISIT (OUTPATIENT)
Dept: LAB | Facility: HOSPITAL | Age: 45
End: 2025-06-11
Attending: FAMILY MEDICINE
Payer: COMMERCIAL

## 2025-06-11 ENCOUNTER — OFFICE VISIT (OUTPATIENT)
Dept: INTERNAL MEDICINE | Facility: CLINIC | Age: 45
End: 2025-06-11
Payer: COMMERCIAL

## 2025-06-11 VITALS
BODY MASS INDEX: 39.2 KG/M2 | DIASTOLIC BLOOD PRESSURE: 86 MMHG | OXYGEN SATURATION: 97 % | SYSTOLIC BLOOD PRESSURE: 118 MMHG | RESPIRATION RATE: 18 BRPM | WEIGHT: 289 LBS | HEART RATE: 73 BPM | TEMPERATURE: 97 F

## 2025-06-11 DIAGNOSIS — K21.9 GASTROESOPHAGEAL REFLUX DISEASE WITHOUT ESOPHAGITIS: ICD-10-CM

## 2025-06-11 DIAGNOSIS — Z79.899 ENCOUNTER FOR LONG-TERM (CURRENT) USE OF MEDICATIONS: ICD-10-CM

## 2025-06-11 DIAGNOSIS — E11.8 CONTROLLED TYPE 2 DIABETES MELLITUS WITH COMPLICATION, WITHOUT LONG-TERM CURRENT USE OF INSULIN: ICD-10-CM

## 2025-06-11 DIAGNOSIS — M47.816 LUMBAR SPONDYLOSIS: ICD-10-CM

## 2025-06-11 DIAGNOSIS — G47.33 OSA (OBSTRUCTIVE SLEEP APNEA): ICD-10-CM

## 2025-06-11 DIAGNOSIS — M25.562 CHRONIC PAIN OF BOTH KNEES: ICD-10-CM

## 2025-06-11 DIAGNOSIS — E78.5 HYPERLIPIDEMIA, UNSPECIFIED HYPERLIPIDEMIA TYPE: ICD-10-CM

## 2025-06-11 DIAGNOSIS — M79.12 MYALGIA OF AUXILIARY MUSCLES, HEAD AND NECK: ICD-10-CM

## 2025-06-11 DIAGNOSIS — F32.A ANXIETY AND DEPRESSION: ICD-10-CM

## 2025-06-11 DIAGNOSIS — G89.29 CHRONIC PAIN OF BOTH KNEES: ICD-10-CM

## 2025-06-11 DIAGNOSIS — G89.29 CHRONIC MYOFASCIAL PAIN: ICD-10-CM

## 2025-06-11 DIAGNOSIS — Z78.9 STATIN INTOLERANCE: Primary | ICD-10-CM

## 2025-06-11 DIAGNOSIS — G89.29 CHRONIC LOW BACK PAIN, UNSPECIFIED BACK PAIN LATERALITY, UNSPECIFIED WHETHER SCIATICA PRESENT: ICD-10-CM

## 2025-06-11 DIAGNOSIS — F41.9 ANXIETY AND DEPRESSION: ICD-10-CM

## 2025-06-11 DIAGNOSIS — M25.561 CHRONIC PAIN OF BOTH KNEES: ICD-10-CM

## 2025-06-11 DIAGNOSIS — M79.18 CHRONIC MYOFASCIAL PAIN: ICD-10-CM

## 2025-06-11 DIAGNOSIS — F33.1 DEPRESSION, MAJOR, RECURRENT, MODERATE: ICD-10-CM

## 2025-06-11 DIAGNOSIS — M54.50 CHRONIC LOW BACK PAIN, UNSPECIFIED BACK PAIN LATERALITY, UNSPECIFIED WHETHER SCIATICA PRESENT: ICD-10-CM

## 2025-06-11 DIAGNOSIS — M79.10 MYALGIA DUE TO STATIN: ICD-10-CM

## 2025-06-11 DIAGNOSIS — T46.6X5A MYALGIA DUE TO STATIN: ICD-10-CM

## 2025-06-11 PROBLEM — E66.01 SEVERE OBESITY (BMI >= 40): Status: RESOLVED | Noted: 2019-04-03 | Resolved: 2025-06-11

## 2025-06-11 LAB
ALBUMIN SERPL BCP-MCNC: 4.1 G/DL (ref 3.5–5.2)
ALP SERPL-CCNC: 63 UNIT/L (ref 40–150)
ALT SERPL W/O P-5'-P-CCNC: 28 UNIT/L (ref 10–44)
ANION GAP (OHS): 8 MMOL/L (ref 8–16)
AST SERPL-CCNC: 26 UNIT/L (ref 11–45)
BILIRUB SERPL-MCNC: 0.6 MG/DL (ref 0.1–1)
BUN SERPL-MCNC: 13 MG/DL (ref 6–20)
CALCIUM SERPL-MCNC: 9.4 MG/DL (ref 8.7–10.5)
CHLORIDE SERPL-SCNC: 109 MMOL/L (ref 95–110)
CO2 SERPL-SCNC: 25 MMOL/L (ref 23–29)
CREAT SERPL-MCNC: 0.7 MG/DL (ref 0.5–1.4)
EAG (OHS): 100 MG/DL (ref 68–131)
GFR SERPLBLD CREATININE-BSD FMLA CKD-EPI: >60 ML/MIN/1.73/M2
GLUCOSE SERPL-MCNC: 85 MG/DL (ref 70–110)
HBA1C MFR BLD: 5.1 % (ref 4–5.6)
MAGNESIUM SERPL-MCNC: 1.9 MG/DL (ref 1.6–2.6)
POTASSIUM SERPL-SCNC: 4.3 MMOL/L (ref 3.5–5.1)
PROT SERPL-MCNC: 7.3 GM/DL (ref 6–8.4)
SODIUM SERPL-SCNC: 142 MMOL/L (ref 136–145)
VIT B12 SERPL-MCNC: 467 PG/ML (ref 210–950)

## 2025-06-11 PROCEDURE — 99999 PR PBB SHADOW E&M-EST. PATIENT-LVL III: CPT | Mod: PBBFAC,,, | Performed by: FAMILY MEDICINE

## 2025-06-11 PROCEDURE — 80053 COMPREHEN METABOLIC PANEL: CPT

## 2025-06-11 PROCEDURE — 36415 COLL VENOUS BLD VENIPUNCTURE: CPT

## 2025-06-11 PROCEDURE — 83036 HEMOGLOBIN GLYCOSYLATED A1C: CPT

## 2025-06-11 PROCEDURE — 82607 VITAMIN B-12: CPT

## 2025-06-11 PROCEDURE — 83735 ASSAY OF MAGNESIUM: CPT

## 2025-06-11 RX ORDER — EZETIMIBE 10 MG/1
10 TABLET ORAL DAILY
Qty: 90 TABLET | Refills: 3 | Status: SHIPPED | OUTPATIENT
Start: 2025-06-11 | End: 2026-06-11

## 2025-06-11 RX ORDER — TIRZEPATIDE 15 MG/.5ML
15 INJECTION, SOLUTION SUBCUTANEOUS
Qty: 4 PEN | Refills: 6 | Status: SHIPPED | OUTPATIENT
Start: 2025-06-11

## 2025-06-11 RX ORDER — PANTOPRAZOLE SODIUM 20 MG/1
20 TABLET, DELAYED RELEASE ORAL DAILY
Qty: 90 TABLET | Refills: 1 | Status: SHIPPED | OUTPATIENT
Start: 2025-06-11 | End: 2026-06-11

## 2025-06-11 RX ORDER — PREGABALIN 150 MG/1
150 CAPSULE ORAL NIGHTLY
Qty: 90 CAPSULE | Refills: 1 | Status: SHIPPED | OUTPATIENT
Start: 2025-06-11 | End: 2025-12-08

## 2025-06-11 NOTE — PROGRESS NOTES
Subjective:       Patient ID: Brook Burdick is a 44 y.o. female.    Chief Complaint: follow up on chronic issues      HPI    Problem List[1]    Past Medical History:   Diagnosis Date    Acne     Arthritis of knee 09/30/2020    Encounter for blood transfusion     Food allergy 2006    Peanuts    GERD (gastroesophageal reflux disease)     IBS (irritable bowel syndrome)     Liver disease     NALFD    Migraine headache 2005    Newly diagnosed diabetes 01/29/2024    Obesity     Pancreatitis        Past Surgical History:   Procedure Laterality Date    APPENDECTOMY      BILE DUCT EXPLORATION      CHOLECYSTECTOMY      INJECTION OF JOINT Bilateral 09/30/2020    Procedure: Bilateral intraarticular knee injection with local;  Surgeon: Ozzy Sofia MD;  Location: [facility];  Service: Pain Management;  Laterality: Bilateral;       Family History   Problem Relation Name Age of Onset    Cataracts Mother Karyn Burdick     Atrial fibrillation Mother Karyn Burdick     Arthritis Mother Karyn Burdick     Miscarriages / Stillbirths Mother Karyn Burdick     Diabetes Father Mau Burdick     Hypertension Father Mau Burdick     Stroke Father Mau Burdick     Diabetes Sister      Hypertension Sister      Diabetes Sister Michael Burdick     Miscarriages / Stillbirths Sister Diane Alonzo     Hypertension Brother      Diabetes Brother      COPD Brother Rahulnona Burdick Jr     Diabetes Brother Rahulnona Burdick Jr     Hypertension Brother Mau Martinezman      Cancer Maternal Grandfather Thad Tomas     Hypertension Sister Diane Puja     Miscarriages / Stillbirths Sister Diane Burdick-Clinton        Tobacco Use History[2]    Wt Readings from Last 5 Encounters:   06/11/25 131.1 kg (289 lb)   03/24/25 134.7 kg (297 lb)   12/11/24 (!) 137 kg (302 lb)   06/11/24 (!) 144.7 kg (319 lb)   05/08/24 (!) 151.2 kg (333 lb 5.4 oz)       For further HPI details, see assessment and plan.    Review of Systems  no acute  issues  Objective:      Vitals:    06/11/25 1339   BP: 118/86   Pulse: 73   Resp: 18   Temp: 97.3 °F (36.3 °C)         Physical Exam  Constitutional:       General: She is not in acute distress.     Appearance: She is not ill-appearing.   Pulmonary:      Effort: Pulmonary effort is normal. No respiratory distress.   Neurological:      General: No focal deficit present.      Mental Status: She is alert.   Psychiatric:         Mood and Affect: Mood normal.         Behavior: Behavior normal.       Protective Sensation (w/ 10 gram monofilament):  Right: Intact  Left: Intact    Visual Inspection:  Normal -  Bilateral    Pedal Pulses:   Right: Present  Left: Present    Posterior Tibialis Pulses:   Right:Present  Left: Present    Assessment:       1. Statin intolerance    2. Myalgia due to statin    3. Controlled type 2 diabetes mellitus with complication, without long-term current use of insulin    4. Anxiety and depression    5. Gastroesophageal reflux disease without esophagitis    6. Hyperlipidemia, unspecified hyperlipidemia type    7. STACI (obstructive sleep apnea)    8. Encounter for long-term (current) use of medications    9. Chronic myofascial pain    10. Chronic pain of both knees        Plan:   Statin intolerance  Myalgia due to statin  Hyperlipidemia, unspecified hyperlipidemia type    Patient has tried and failed two statins.  She has experienced muscle aches and headache and itching and malaise and it subsided after discontinuation. she has tried and failed Lipitor and pravastatin    I would like her LDL to be some 70 given diabetic status.  Her most recent LDL was 6 months ago at 131.  I do want to try an alternative lipid lowering medication and I am going to prescribed Zetia 10 mg today.  We will check an LDL via a lipid panel in about 6 months    Controlled type 2 diabetes mellitus with complication, without long-term current use of insulin    Patient's most recent A1c was quite well controlled at 5.1 as  "of 6 months ago.  I would like to update an A1c today to ensure consistent control    Patient is taking and tolerating Mounjaro 15 mg.  Continue medication.  Heavy emphasis on exercise to maintain muscle mass and bone density    I am super happy to see such significant weight loss and I am even happier to hear that she considers her a self to be a "gym rat "as she is exercising regularly    Patient annual eye exam is due and I am happy to hear it scheduled    Anxiety and depression    Patient is doing well with her Lexapro.  Continue this and her other psychiatric medications as per her psychiatrist    Gastroesophageal reflux disease without esophagitis  Patient is still taking Protonix 20 mg.  She is doing well with this.  We will continue for now.  I would like to check a magnesium level and a B12 level given long-term use of PPI      STACI (obstructive sleep apnea)   sleep apnea is untreated.  If she is unable to tolerate CPAP I would encourage her to talk to her dentist about an oral device.  I do not like the idea of untreated sleep apnea.  If dental device fails could consider discussions with the [facility] for inspire    Chronic myofascial pain and chronic pain of both knees.  Patient takes Lyrica and tolerates and benefits and wants to continue the medication.  We will continue her Lyrica    Patient declines vaccinations.  If she ever changes her mind she is welcome to come back and we will provide them.  She is due for tetanus and pneumococcal    Chronic back pain  Patient has chiropractor ordered an MRI.  Impression per Radiology report as follows   Disc is desiccated and circumferentially bulging at L5-S1.  In combination with facet arthropathy there is mild bilateral foraminal narrowing at this level    Shallow broad-based disc herniation at L3-4 mildly deforms the anterior thecal sac and in combination with facet arthropathy there is mild spinal canal stenosis as well as mild " narrowing of the inferior aspect of both neuro foramina    Mild multilevel facet arthropathy with facet joint effusion on the left at L4-5    Given these abnormal findings an ongoing chronic pain plan to consult the spine clinic  Patient has tried and failed Cymbalta.  Patient is adamant in declining physical therapy as it has made her issues worse in the past.    This note was verbally dictated, please excuse any type errors.         [1]   Patient Active Problem List  Diagnosis    Chronic constipation    Family history of colonic polyps    Insulin resistance    Gastroesophageal reflux disease without esophagitis    Severe obesity (BMI >= 40)    Chronic headaches    Cough    Arthritis of knee    Neck pain    Right arm pain    Decreased ROM of intervertebral discs of cervical spine    Right arm weakness    Anxiety and depression    Bilateral carpal tunnel syndrome    Newly diagnosed diabetes    Depression, major, recurrent, moderate    PCOS (polycystic ovarian syndrome)    Snores    Myalgia due to statin    Statin intolerance   [2]   Social History  Tobacco Use   Smoking Status Some Days    Types: Vaping w/o nicotine    Start date:     Last attempt to quit:     Years since quittin.4    Passive exposure: Never   Smokeless Tobacco Never

## 2025-06-12 ENCOUNTER — RESULTS FOLLOW-UP (OUTPATIENT)
Dept: INTERNAL MEDICINE | Facility: CLINIC | Age: 45
End: 2025-06-12

## 2025-06-30 ENCOUNTER — PATIENT MESSAGE (OUTPATIENT)
Dept: PAIN MEDICINE | Facility: CLINIC | Age: 45
End: 2025-06-30

## 2025-06-30 ENCOUNTER — OFFICE VISIT (OUTPATIENT)
Dept: PAIN MEDICINE | Facility: CLINIC | Age: 45
End: 2025-06-30
Payer: COMMERCIAL

## 2025-06-30 VITALS
DIASTOLIC BLOOD PRESSURE: 80 MMHG | HEIGHT: 72 IN | WEIGHT: 288.81 LBS | HEART RATE: 68 BPM | SYSTOLIC BLOOD PRESSURE: 126 MMHG | RESPIRATION RATE: 17 BRPM | BODY MASS INDEX: 39.12 KG/M2

## 2025-06-30 DIAGNOSIS — M79.18 CHRONIC MYOFASCIAL PAIN: ICD-10-CM

## 2025-06-30 DIAGNOSIS — G89.29 CHRONIC PAIN OF BOTH KNEES: ICD-10-CM

## 2025-06-30 DIAGNOSIS — G89.29 CHRONIC MYOFASCIAL PAIN: ICD-10-CM

## 2025-06-30 DIAGNOSIS — M25.562 CHRONIC PAIN OF BOTH KNEES: ICD-10-CM

## 2025-06-30 DIAGNOSIS — M47.816 LUMBAR SPONDYLOSIS: ICD-10-CM

## 2025-06-30 DIAGNOSIS — M25.561 CHRONIC PAIN OF BOTH KNEES: ICD-10-CM

## 2025-06-30 DIAGNOSIS — G89.29 CHRONIC LOW BACK PAIN, UNSPECIFIED BACK PAIN LATERALITY, UNSPECIFIED WHETHER SCIATICA PRESENT: ICD-10-CM

## 2025-06-30 DIAGNOSIS — M79.12 MYALGIA OF AUXILIARY MUSCLES, HEAD AND NECK: ICD-10-CM

## 2025-06-30 DIAGNOSIS — M54.50 CHRONIC LOW BACK PAIN, UNSPECIFIED BACK PAIN LATERALITY, UNSPECIFIED WHETHER SCIATICA PRESENT: ICD-10-CM

## 2025-06-30 PROCEDURE — 3044F HG A1C LEVEL LT 7.0%: CPT | Mod: CPTII,S$GLB,, | Performed by: PHYSICAL MEDICINE & REHABILITATION

## 2025-06-30 PROCEDURE — 3008F BODY MASS INDEX DOCD: CPT | Mod: CPTII,S$GLB,, | Performed by: PHYSICAL MEDICINE & REHABILITATION

## 2025-06-30 PROCEDURE — 3074F SYST BP LT 130 MM HG: CPT | Mod: CPTII,S$GLB,, | Performed by: PHYSICAL MEDICINE & REHABILITATION

## 2025-06-30 PROCEDURE — 99214 OFFICE O/P EST MOD 30 MIN: CPT | Mod: S$GLB,,, | Performed by: PHYSICAL MEDICINE & REHABILITATION

## 2025-06-30 PROCEDURE — 1159F MED LIST DOCD IN RCRD: CPT | Mod: CPTII,S$GLB,, | Performed by: PHYSICAL MEDICINE & REHABILITATION

## 2025-06-30 PROCEDURE — 99999 PR PBB SHADOW E&M-EST. PATIENT-LVL IV: CPT | Mod: PBBFAC,,, | Performed by: PHYSICAL MEDICINE & REHABILITATION

## 2025-06-30 PROCEDURE — 3079F DIAST BP 80-89 MM HG: CPT | Mod: CPTII,S$GLB,, | Performed by: PHYSICAL MEDICINE & REHABILITATION

## 2025-06-30 PROCEDURE — 3072F LOW RISK FOR RETINOPATHY: CPT | Mod: CPTII,S$GLB,, | Performed by: PHYSICAL MEDICINE & REHABILITATION

## 2025-06-30 RX ORDER — TIZANIDINE 4 MG/1
TABLET ORAL
Qty: 90 TABLET | Refills: 3 | Status: SHIPPED | OUTPATIENT
Start: 2025-06-30

## 2025-06-30 RX ORDER — DICLOFENAC SODIUM 75 MG/1
75 TABLET, DELAYED RELEASE ORAL 2 TIMES DAILY PRN
Qty: 60 TABLET | Refills: 2 | Status: SHIPPED | OUTPATIENT
Start: 2025-06-30

## 2025-06-30 NOTE — PROGRESS NOTES
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 pain laterality, unspecified whether sciatica present  Ambulatory referral/consult to Pain Clinic      2. Chronic myofascial pain  tiZANidine (ZANAFLEX) 4 MG tablet      3. Myalgia of auxiliary muscles, head and neck  tiZANidine (ZANAFLEX) 4 MG tablet      4. Chronic pain of both knees  tiZANidine (ZANAFLEX) 4 MG tablet      5. Lumbar spondylosis  tiZANidine (ZANAFLEX) 4 MG tablet              Chronic low back pain, unspecified back pain laterality, unspecified whether sciatica present  -     Ambulatory referral/consult to Pain Clinic    Chronic myofascial pain  -     tiZANidine (ZANAFLEX) 4 MG tablet; TAKE 1/2 TO 1 TABLET BY MOUTH NIGHTLY AS NEEDED FOR MUSCLE SPASMS. MAY CAUSE DROWSINESS  Dispense: 90 tablet; Refill: 3    Myalgia of auxiliary muscles, head and neck  -     tiZANidine (ZANAFLEX) 4 MG tablet; TAKE 1/2 TO 1 TABLET BY MOUTH NIGHTLY AS NEEDED FOR MUSCLE SPASMS. MAY CAUSE DROWSINESS  Dispense: 90 tablet; Refill: 3    Chronic pain of both knees  -     tiZANidine (ZANAFLEX) 4 MG tablet; TAKE 1/2 TO 1 TABLET BY MOUTH NIGHTLY AS NEEDED FOR MUSCLE SPASMS. MAY CAUSE DROWSINESS  Dispense: 90 tablet; Refill: 3    Lumbar spondylosis  -     tiZANidine (ZANAFLEX) 4 MG tablet; TAKE  1/2 TO 1 TABLET BY MOUTH NIGHTLY AS NEEDED FOR MUSCLE SPASMS. MAY CAUSE DROWSINESS  Dispense: 90 tablet; Refill: 3    Other orders  -     diclofenac (VOLTAREN) 75 MG EC tablet; Take 1 tablet (75 mg total) by mouth 2 (two) times daily as needed (pain).  Dispense: 60 tablet; Refill: 2              PLAN:   - Interventional:  None at this time.     - Pharmacologic:   -continue Lyrica 150 mg nightly  -Refill tizanidine 2-4 mg nightly as needed  -provide diclofenac 75 mg b.i.d. and advised patient to take for 2 weeks straight and then change to p.r.n.    -  Previously discontinued zonisamide.  -  No longer utilizing Voltaren gel.    - Anticoagulation use: None.         report:  Reviewed and consistent with medication use as prescribed.    - Rehabilitative:  Continue exercises and activities as tolerated.       - Diagnostic:  Reviewed     - Follow up:   3-4 months or as needed     - This condition does not require this patient to take time off of work, and the primary goal of our Pain Management services is to improve the patient's functional capacity.      - I discussed the risks, benefits, and alternatives to potential treatment options. All questions and concerns were fully addressed today in clinic.     The above plan and management options were discussed at length with patient. Patient is in agreement with the above and verbalized understanding.      Ricki Noriega MD  Interventional Pain Management  [facility]

## 2025-08-11 DIAGNOSIS — F33.42 RECURRENT MAJOR DEPRESSIVE DISORDER, IN FULL REMISSION: ICD-10-CM

## 2025-08-11 DIAGNOSIS — F41.1 GENERALIZED ANXIETY DISORDER: ICD-10-CM

## 2025-08-12 ENCOUNTER — NURSE TRIAGE (OUTPATIENT)
Dept: ADMINISTRATIVE | Facility: CLINIC | Age: 45
End: 2025-08-12
Payer: COMMERCIAL

## 2025-08-12 RX ORDER — ESCITALOPRAM OXALATE 20 MG/1
TABLET ORAL
Qty: 90 TABLET | Refills: 0 | Status: SHIPPED | OUTPATIENT
Start: 2025-08-12

## 2025-08-27 ENCOUNTER — RESEARCH ENCOUNTER (OUTPATIENT)
Dept: RESEARCH | Facility: HOSPITAL | Age: 45
End: 2025-08-27
Payer: COMMERCIAL